# Patient Record
Sex: FEMALE | Race: WHITE | Employment: OTHER
[De-identification: names, ages, dates, MRNs, and addresses within clinical notes are randomized per-mention and may not be internally consistent; named-entity substitution may affect disease eponyms.]

---

## 2017-01-24 ENCOUNTER — OFFICE VISIT (OUTPATIENT)
Dept: FAMILY MEDICINE CLINIC | Facility: CLINIC | Age: 77
End: 2017-01-24

## 2017-01-24 VITALS
WEIGHT: 163 LBS | SYSTOLIC BLOOD PRESSURE: 120 MMHG | DIASTOLIC BLOOD PRESSURE: 60 MMHG | BODY MASS INDEX: 29.81 KG/M2 | TEMPERATURE: 98.5 F

## 2017-01-24 DIAGNOSIS — J01.00 ACUTE NON-RECURRENT MAXILLARY SINUSITIS: Primary | ICD-10-CM

## 2017-01-24 PROCEDURE — 1036F TOBACCO NON-USER: CPT | Performed by: FAMILY MEDICINE

## 2017-01-24 PROCEDURE — 99213 OFFICE O/P EST LOW 20 MIN: CPT | Performed by: FAMILY MEDICINE

## 2017-01-24 PROCEDURE — 1123F ACP DISCUSS/DSCN MKR DOCD: CPT | Performed by: FAMILY MEDICINE

## 2017-01-24 PROCEDURE — G8400 PT W/DXA NO RESULTS DOC: HCPCS | Performed by: FAMILY MEDICINE

## 2017-01-24 PROCEDURE — G8484 FLU IMMUNIZE NO ADMIN: HCPCS | Performed by: FAMILY MEDICINE

## 2017-01-24 PROCEDURE — 1090F PRES/ABSN URINE INCON ASSESS: CPT | Performed by: FAMILY MEDICINE

## 2017-01-24 PROCEDURE — G8427 DOCREV CUR MEDS BY ELIG CLIN: HCPCS | Performed by: FAMILY MEDICINE

## 2017-01-24 PROCEDURE — G8420 CALC BMI NORM PARAMETERS: HCPCS | Performed by: FAMILY MEDICINE

## 2017-01-24 PROCEDURE — 4040F PNEUMOC VAC/ADMIN/RCVD: CPT | Performed by: FAMILY MEDICINE

## 2017-01-24 RX ORDER — AMOXICILLIN AND CLAVULANATE POTASSIUM 875; 125 MG/1; MG/1
1 TABLET, FILM COATED ORAL 2 TIMES DAILY
Qty: 20 TABLET | Refills: 0 | Status: SHIPPED | OUTPATIENT
Start: 2017-01-24 | End: 2017-02-03

## 2017-01-24 ASSESSMENT — ENCOUNTER SYMPTOMS
SINUS PAIN: 1
SORE THROAT: 0
EYE REDNESS: 0
VOMITING: 0
DIARRHEA: 0
EYE DISCHARGE: 0
COUGH: 1
TROUBLE SWALLOWING: 0
NAUSEA: 0
SINUS PRESSURE: 1

## 2017-01-24 ASSESSMENT — PATIENT HEALTH QUESTIONNAIRE - PHQ9
2. FEELING DOWN, DEPRESSED OR HOPELESS: 0
1. LITTLE INTEREST OR PLEASURE IN DOING THINGS: 0
SUM OF ALL RESPONSES TO PHQ QUESTIONS 1-9: 0
SUM OF ALL RESPONSES TO PHQ9 QUESTIONS 1 & 2: 0

## 2017-05-15 ENCOUNTER — OFFICE VISIT (OUTPATIENT)
Dept: FAMILY MEDICINE CLINIC | Age: 77
End: 2017-05-15
Payer: MEDICARE

## 2017-05-15 VITALS
SYSTOLIC BLOOD PRESSURE: 136 MMHG | BODY MASS INDEX: 30.54 KG/M2 | WEIGHT: 167 LBS | OXYGEN SATURATION: 97 % | DIASTOLIC BLOOD PRESSURE: 62 MMHG | HEART RATE: 73 BPM

## 2017-05-15 DIAGNOSIS — M17.10 ARTHRITIS OF KNEE: Primary | ICD-10-CM

## 2017-05-15 DIAGNOSIS — Z12.31 ENCOUNTER FOR SCREENING MAMMOGRAM FOR BREAST CANCER: ICD-10-CM

## 2017-05-15 PROCEDURE — 1090F PRES/ABSN URINE INCON ASSESS: CPT | Performed by: FAMILY MEDICINE

## 2017-05-15 PROCEDURE — G8400 PT W/DXA NO RESULTS DOC: HCPCS | Performed by: FAMILY MEDICINE

## 2017-05-15 PROCEDURE — G8419 CALC BMI OUT NRM PARAM NOF/U: HCPCS | Performed by: FAMILY MEDICINE

## 2017-05-15 PROCEDURE — 4040F PNEUMOC VAC/ADMIN/RCVD: CPT | Performed by: FAMILY MEDICINE

## 2017-05-15 PROCEDURE — 1036F TOBACCO NON-USER: CPT | Performed by: FAMILY MEDICINE

## 2017-05-15 PROCEDURE — 1123F ACP DISCUSS/DSCN MKR DOCD: CPT | Performed by: FAMILY MEDICINE

## 2017-05-15 PROCEDURE — G8427 DOCREV CUR MEDS BY ELIG CLIN: HCPCS | Performed by: FAMILY MEDICINE

## 2017-05-15 PROCEDURE — 99213 OFFICE O/P EST LOW 20 MIN: CPT | Performed by: FAMILY MEDICINE

## 2017-05-15 ASSESSMENT — ENCOUNTER SYMPTOMS
EYE REDNESS: 0
FACIAL SWELLING: 0
VOMITING: 0
ABDOMINAL PAIN: 0
CONSTIPATION: 0
DIARRHEA: 0
SHORTNESS OF BREATH: 0
COUGH: 0
EYE DISCHARGE: 0
BLOOD IN STOOL: 0
NAUSEA: 0

## 2017-05-15 ASSESSMENT — PATIENT HEALTH QUESTIONNAIRE - PHQ9
SUM OF ALL RESPONSES TO PHQ QUESTIONS 1-9: 0
SUM OF ALL RESPONSES TO PHQ9 QUESTIONS 1 & 2: 0
2. FEELING DOWN, DEPRESSED OR HOPELESS: 0
1. LITTLE INTEREST OR PLEASURE IN DOING THINGS: 0

## 2017-05-24 ENCOUNTER — HOSPITAL ENCOUNTER (OUTPATIENT)
Dept: MAMMOGRAPHY | Age: 77
Discharge: HOME | End: 2017-05-24
Payer: MEDICARE

## 2017-05-24 DIAGNOSIS — Z12.31 ENCOUNTER FOR SCREENING MAMMOGRAM FOR BREAST CANCER: ICD-10-CM

## 2017-05-24 PROCEDURE — G0202 SCR MAMMO BI INCL CAD: HCPCS

## 2018-05-08 ENCOUNTER — OFFICE VISIT (OUTPATIENT)
Dept: FAMILY MEDICINE CLINIC | Age: 78
End: 2018-05-08
Payer: MEDICARE

## 2018-05-08 VITALS
SYSTOLIC BLOOD PRESSURE: 138 MMHG | BODY MASS INDEX: 30.18 KG/M2 | DIASTOLIC BLOOD PRESSURE: 68 MMHG | HEART RATE: 92 BPM | OXYGEN SATURATION: 96 % | WEIGHT: 165 LBS

## 2018-05-08 DIAGNOSIS — H35.61 RETINAL HEMORRHAGE NOTED ON EXAMINATION OF RIGHT EYE: Primary | ICD-10-CM

## 2018-05-08 DIAGNOSIS — R42 DIZZINESS: ICD-10-CM

## 2018-05-08 PROCEDURE — G8419 CALC BMI OUT NRM PARAM NOF/U: HCPCS | Performed by: FAMILY MEDICINE

## 2018-05-08 PROCEDURE — G8427 DOCREV CUR MEDS BY ELIG CLIN: HCPCS | Performed by: FAMILY MEDICINE

## 2018-05-08 PROCEDURE — G8400 PT W/DXA NO RESULTS DOC: HCPCS | Performed by: FAMILY MEDICINE

## 2018-05-08 PROCEDURE — 1036F TOBACCO NON-USER: CPT | Performed by: FAMILY MEDICINE

## 2018-05-08 PROCEDURE — 99213 OFFICE O/P EST LOW 20 MIN: CPT | Performed by: FAMILY MEDICINE

## 2018-05-08 PROCEDURE — 1123F ACP DISCUSS/DSCN MKR DOCD: CPT | Performed by: FAMILY MEDICINE

## 2018-05-08 PROCEDURE — 1090F PRES/ABSN URINE INCON ASSESS: CPT | Performed by: FAMILY MEDICINE

## 2018-05-08 PROCEDURE — 4040F PNEUMOC VAC/ADMIN/RCVD: CPT | Performed by: FAMILY MEDICINE

## 2018-05-08 ASSESSMENT — ENCOUNTER SYMPTOMS
VOMITING: 0
EYE REDNESS: 0
SHORTNESS OF BREATH: 0
COUGH: 0
EYE PAIN: 0
FACIAL SWELLING: 0
EYE DISCHARGE: 0
DIARRHEA: 0

## 2018-05-16 ENCOUNTER — HOSPITAL ENCOUNTER (OUTPATIENT)
Dept: VASCULAR LAB | Age: 78
Discharge: HOME | End: 2018-05-18
Payer: MEDICARE

## 2018-05-16 DIAGNOSIS — R42 DIZZINESS: ICD-10-CM

## 2018-05-16 DIAGNOSIS — H35.61 RETINAL HEMORRHAGE NOTED ON EXAMINATION OF RIGHT EYE: ICD-10-CM

## 2018-05-16 PROCEDURE — 93880 EXTRACRANIAL BILAT STUDY: CPT

## 2018-05-17 LAB
BUN BLDV-MCNC: 16 MG/DL
CALCIUM SERPL-MCNC: 9.2 MG/DL
CHLORIDE BLD-SCNC: 106 MMOL/L
CHOLESTEROL, TOTAL: 205 MG/DL
CHOLESTEROL/HDL RATIO: 3.6
CO2: 27 MMOL/L
CREAT SERPL-MCNC: 0.66 MG/DL
GFR CALCULATED: >60
GLUCOSE BLD-MCNC: 99 MG/DL
HDLC SERPL-MCNC: 57 MG/DL (ref 35–70)
LDL CHOLESTEROL CALCULATED: 123 MG/DL (ref 0–160)
POTASSIUM SERPL-SCNC: 4.9 MMOL/L
SODIUM BLD-SCNC: 143 MMOL/L
TRIGL SERPL-MCNC: 125 MG/DL
VLDLC SERPL CALC-MCNC: NORMAL MG/DL

## 2018-05-30 ENCOUNTER — TELEPHONE (OUTPATIENT)
Dept: FAMILY MEDICINE CLINIC | Age: 78
End: 2018-05-30

## 2018-05-30 DIAGNOSIS — R42 DIZZINESS: ICD-10-CM

## 2018-05-30 DIAGNOSIS — H35.61 RETINAL HEMORRHAGE OF RIGHT EYE: Primary | ICD-10-CM

## 2018-06-04 ENCOUNTER — OFFICE VISIT (OUTPATIENT)
Dept: FAMILY MEDICINE CLINIC | Age: 78
End: 2018-06-04
Payer: MEDICARE

## 2018-06-04 VITALS
WEIGHT: 164.2 LBS | SYSTOLIC BLOOD PRESSURE: 120 MMHG | OXYGEN SATURATION: 96 % | HEART RATE: 75 BPM | HEIGHT: 62 IN | DIASTOLIC BLOOD PRESSURE: 68 MMHG | BODY MASS INDEX: 30.22 KG/M2

## 2018-06-04 DIAGNOSIS — L23.89 ALLERGIC CONTACT DERMATITIS DUE TO OTHER AGENTS: Primary | ICD-10-CM

## 2018-06-04 PROCEDURE — 1090F PRES/ABSN URINE INCON ASSESS: CPT | Performed by: FAMILY MEDICINE

## 2018-06-04 PROCEDURE — 1123F ACP DISCUSS/DSCN MKR DOCD: CPT | Performed by: FAMILY MEDICINE

## 2018-06-04 PROCEDURE — G8427 DOCREV CUR MEDS BY ELIG CLIN: HCPCS | Performed by: FAMILY MEDICINE

## 2018-06-04 PROCEDURE — 3288F FALL RISK ASSESSMENT DOCD: CPT | Performed by: FAMILY MEDICINE

## 2018-06-04 PROCEDURE — 4040F PNEUMOC VAC/ADMIN/RCVD: CPT | Performed by: FAMILY MEDICINE

## 2018-06-04 PROCEDURE — G8400 PT W/DXA NO RESULTS DOC: HCPCS | Performed by: FAMILY MEDICINE

## 2018-06-04 PROCEDURE — 1036F TOBACCO NON-USER: CPT | Performed by: FAMILY MEDICINE

## 2018-06-04 PROCEDURE — G8419 CALC BMI OUT NRM PARAM NOF/U: HCPCS | Performed by: FAMILY MEDICINE

## 2018-06-04 PROCEDURE — G8510 SCR DEP NEG, NO PLAN REQD: HCPCS | Performed by: FAMILY MEDICINE

## 2018-06-04 PROCEDURE — 99213 OFFICE O/P EST LOW 20 MIN: CPT | Performed by: FAMILY MEDICINE

## 2018-06-04 RX ORDER — TRIAMCINOLONE ACETONIDE 40 MG/ML
40 INJECTION, SUSPENSION INTRA-ARTICULAR; INTRAMUSCULAR ONCE
Status: COMPLETED | OUTPATIENT
Start: 2018-06-04 | End: 2018-06-04

## 2018-06-04 RX ADMIN — TRIAMCINOLONE ACETONIDE 40 MG: 40 INJECTION, SUSPENSION INTRA-ARTICULAR; INTRAMUSCULAR at 10:30

## 2018-06-04 ASSESSMENT — ENCOUNTER SYMPTOMS
EYE REDNESS: 0
COUGH: 0
TROUBLE SWALLOWING: 0
VOMITING: 0
EYE DISCHARGE: 0
SHORTNESS OF BREATH: 0
DIARRHEA: 0
SORE THROAT: 0

## 2018-06-04 ASSESSMENT — PATIENT HEALTH QUESTIONNAIRE - PHQ9
SUM OF ALL RESPONSES TO PHQ9 QUESTIONS 1 & 2: 0
SUM OF ALL RESPONSES TO PHQ QUESTIONS 1-9: 0
2. FEELING DOWN, DEPRESSED OR HOPELESS: 0
1. LITTLE INTEREST OR PLEASURE IN DOING THINGS: 0

## 2018-07-03 ENCOUNTER — TELEPHONE (OUTPATIENT)
Dept: FAMILY MEDICINE CLINIC | Age: 78
End: 2018-07-03

## 2018-07-03 DIAGNOSIS — Z12.31 ENCOUNTER FOR SCREENING MAMMOGRAM FOR BREAST CANCER: Primary | ICD-10-CM

## 2018-07-09 ENCOUNTER — HOSPITAL ENCOUNTER (OUTPATIENT)
Dept: MAMMOGRAPHY | Age: 78
Discharge: HOME | End: 2018-07-11
Payer: MEDICARE

## 2018-07-09 ENCOUNTER — HOSPITAL ENCOUNTER (OUTPATIENT)
Dept: PHYSICAL THERAPY | Age: 78
Setting detail: THERAPIES SERIES
Discharge: HOME | End: 2018-07-09
Payer: MEDICARE

## 2018-07-09 DIAGNOSIS — Z12.31 ENCOUNTER FOR SCREENING MAMMOGRAM FOR BREAST CANCER: ICD-10-CM

## 2018-07-09 PROCEDURE — 77067 SCR MAMMO BI INCL CAD: CPT

## 2018-07-09 PROCEDURE — G8978 MOBILITY CURRENT STATUS: HCPCS

## 2018-07-09 PROCEDURE — G8979 MOBILITY GOAL STATUS: HCPCS

## 2018-07-09 PROCEDURE — 97161 PT EVAL LOW COMPLEX 20 MIN: CPT

## 2018-07-09 NOTE — PLAN OF CARE
3: Pt to have 4+/5 Hip abd strength to improve walking arabella and balance. Long term goal 4: Pt to reports worst pain following days ADLs <5/10 x 2 consecutive days to improve ADLs. Anahy Selby, PT     Date: 7/9/2018    ______________________________________ Date: 7/9/2018  Physician Signature  By signing above or cosigning electronically, I have reviewed this Plan of Care and certify a need for medically necessary rehabilitation services.

## 2018-07-09 NOTE — PROGRESS NOTES
3+/5  L Hip ABduction: 3+/5  L Hip ADduction: 4+/5  L Knee Flexion: 4+/5  L Knee Extension: 4+/5  L Ankle Dorsiflexion: 5/5  AROM LLE (degrees)  L Knee Flexion 0-145: 114deg  L Knee Extension 0: 3deg FN  L Ankle Dorsiflexion 0-20: 5deg     AROM RLE (degrees)  R Knee Flexion 0-145: 120deg  R Knee Extension 0: 10degFN  R Ankle Dorsiflexion 0-20: neutral  AROM LLE (degrees)  L Knee Flexion 0-145: 114deg  L Knee Extension 0: 3deg FN  L Ankle Dorsiflexion 0-20: 5deg     Assessment  Body structures, Functions, Activity limitations: Decreased functional mobility , Decreased ADL status, Decreased ROM, Decreased strength, Decreased endurance, Decreased balance, Decreased high-level IADLs  Assessment: Pt to benefit from ther ex for ROM and strength of B/L LEs, and balance training. IFC/CP PRN for pain management.   Prognosis: Good  Decision Making: Low Complexity  Exam: LEFS=46/80    Clinical Presentation:  [x] Stable/Uncomplicated  [] Evolving [] Unstable/Unpredictable  The Following Comorbities will impact the patients progression and Plan of Care:   [] Diabetes    [] Stroke  [] Cardiac Disease/Pacemaker [] Previous Orthopedic Injury/Surgery  [] Seizure Disorder   [] WB Restrictions  [] Obesity    [] Neuropathy     Activity Tolerance: Patient Tolerated treatment well  [x] Primary Impairment : Mobility due to knee pain B/L [] Secondary Impairment : NA        G Code:    [x] Mobility         [] Carry        [] Body Position       [] Self Care      [] Other:   Functional Impairment Current:  [] 0%    [] 1-19% [] 20-39% [x] 40-59% [] 60-79%    [] 80-99% [] 100%    Functional Impairment Goal:  [] 0%    [x] 1-19% [] 20-39% [] 40-59% [] 60-79%    [] 80-99% [] 100%    G Code Functional Impairment determined by:  [x] Clinical Judgment   [x] Outcome Measure: LEFS  Education:   Patient Education: POC  Barriers to Learning: None    Goals  Short term goals  Time Frame for Short term goals: 9 visits  Short term goal 1: Pt to report independence and compliance with HEP. Long term goals  Time Frame for Long term goals : 18 visits  Long term goal 1: Pt to score > 54/80 on LEFS to improve ADL arabella. Long term goal 2: Pt to have R knee ext <5deg FN with AROM to improve standing and walking arabella. Long term goal 3: Pt to have 4+/5 Hip abd strength to improve walking arabella and balance. Long term goal 4: Pt to reports worst pain following days ADLs <5/10 x 2 consecutive days to improve ADLs.     Patient's Goal:   Get stronger before she has her sx    Timed Code Treatment Minutes: 0 Minutes  Total Treatment Time: 50  Time In: 7045  Time Out: Addie 77  7/9/2018

## 2018-07-11 ENCOUNTER — HOSPITAL ENCOUNTER (OUTPATIENT)
Dept: PHYSICAL THERAPY | Age: 78
Setting detail: THERAPIES SERIES
Discharge: HOME | End: 2018-07-11
Payer: MEDICARE

## 2018-07-11 PROCEDURE — 97110 THERAPEUTIC EXERCISES: CPT

## 2018-07-11 ASSESSMENT — PAIN SCALES - GENERAL: PAINLEVEL_OUTOF10: 6

## 2018-07-13 ENCOUNTER — HOSPITAL ENCOUNTER (OUTPATIENT)
Dept: PHYSICAL THERAPY | Age: 78
Setting detail: THERAPIES SERIES
Discharge: HOME | End: 2018-07-13
Payer: MEDICARE

## 2018-07-13 PROCEDURE — 97110 THERAPEUTIC EXERCISES: CPT

## 2018-07-13 ASSESSMENT — PAIN SCALES - GENERAL: PAINLEVEL_OUTOF10: 5

## 2018-07-13 NOTE — PROGRESS NOTES
Phone: [address]      Fax: [phone]                            Outpatient Physical Therapy                                                                            Daily Note    Date: 2018  Patient Name: Regine Morales        MRN: [id number]   ACCT#:  [de-identified]  : 1940  (66 y.o.)    Referring Practitioner: Elmer Tucker CNP    Referral Date : 18    Diagnosis: Pain in both knees, B/L knee Primary Osteoarthritis   Treatment Diagnosis: Gait ataxia due to B/L knee pain    Onset Date: 18  PT Insurance Information: [facility]  Total # of Visits Approved: 18 Per Physician Order  Total # of Visits to Date: 3  No Show: 0  Canceled Appointment: 0    Pre-Treatment Pain: 0/10     Assessment  Assessment: Pt reports mild soreness post last session. Pt reports that she picked green beans yesterday afternoon with noted increased soreness but states she did not squat but she bent at the waist.  Pt educated re: back health and proper mechanics for back safety. Pt notes Hip ABD fatigue during sidestepping, pt educated on importance of hip ABD for standing and walking arabella as well as preventing medial knee collapse. Chart Reviewed: Yes    Plan  Plan: Continue with current plan    Exercises/Modalities/Manual:  See DocFlow Sheet    Education:      Goals  (Total # of Visits to Date: 3)  Short Term Goals - Time Frame for Short term goals: 9 visits  Short term goal 1: Pt to report independence and compliance with HEP. Long Term Goals - Time Frame for Long term goals : 18 visits  Long term goal 1: Pt to score > 54/80 on LEFS to improve ADL arabella. Long term goal 2: Pt to have R knee ext <5deg FN with AROM to improve standing and walking arabella. Long term goal 3: Pt to have 4+/5 Hip abd strength to improve walking arabella and balance. Long term goal 4: Pt to reports worst pain following days ADLs <5/10 x 2 consecutive days to improve ADLs.      Post Treatment Pain:

## 2018-07-13 NOTE — PRE-CERTIFICATION NOTE
Medicare Cap     [] Physical Therapy  [] Speech Therapy  [] Occupational therapy    *PT and Speech caps combine      $1940 Cap limit < kx modifier needed < $2947 requires pre-cert     Patient Name: Oksana Ho  YOB: 1940     Date of [address] Name $$$ charge Daily Charge YTD   Total $   7/9/18 eval 81.88 81.88 81.88   7/11/18 Ex x 2 29.97 x2 59.94 141.82   7/13/18 Ex x3 29.97 x3 89.91 231.73

## 2018-07-16 ENCOUNTER — HOSPITAL ENCOUNTER (OUTPATIENT)
Dept: PHYSICAL THERAPY | Age: 78
Setting detail: THERAPIES SERIES
Discharge: HOME | End: 2018-07-16
Payer: MEDICARE

## 2018-07-16 PROCEDURE — 97110 THERAPEUTIC EXERCISES: CPT

## 2018-07-16 ASSESSMENT — PAIN DESCRIPTION - LOCATION: LOCATION: KNEE

## 2018-07-16 ASSESSMENT — PAIN DESCRIPTION - DIRECTION: RADIATING_TOWARDS: MEDIAL

## 2018-07-16 ASSESSMENT — PAIN SCALES - GENERAL: PAINLEVEL_OUTOF10: 7

## 2018-07-16 ASSESSMENT — PAIN DESCRIPTION - ORIENTATION: ORIENTATION: LEFT

## 2018-07-18 ENCOUNTER — HOSPITAL ENCOUNTER (OUTPATIENT)
Dept: PHYSICAL THERAPY | Age: 78
Setting detail: THERAPIES SERIES
Discharge: HOME | End: 2018-07-18
Payer: MEDICARE

## 2018-07-18 PROCEDURE — 97110 THERAPEUTIC EXERCISES: CPT

## 2018-07-18 ASSESSMENT — PAIN SCALES - GENERAL: PAINLEVEL_OUTOF10: 8

## 2018-07-18 NOTE — PRE-CERTIFICATION NOTE
Medicare Cap     [] Physical Therapy  [] Speech Therapy  [] Occupational therapy    *PT and Speech caps combine      $1940 Cap limit < kx modifier needed < $3339 requires pre-cert     Patient Name: Cristy Coronel  YOB: 1940     Date of [address] Name $$$ charge Daily Charge YTD   Total $   7/9/18 eval 81.88 81.88 81.88   7/11/18 Ex x 2 29.97 x2 59.94 141.82   7/13/18 Ex x3 29.97 x3 89.91 231.73   7/18/18 Ex x 3

## 2018-07-18 NOTE — PROGRESS NOTES
Phone: [address]      Fax: [phone]                            Outpatient Physical Therapy                                                                            Daily Note    Date: 2018  Patient Name: Oksana Ho        MRN: [id number]   ACCT#:  [de-identified]  : 1940  (66 y.o.)    Referring Practitioner: Jessica Ramsey CNP    Referral Date : 18    Diagnosis: Pain in both knees, B/L knee Primary Osteoarthritis   Treatment Diagnosis: Gait ataxia due to B/L knee pain    Onset Date: 18  PT Insurance Information: [facility]  Total # of Visits Approved: 18 Per Physician Order  Total # of Visits to Date: 5  No Show: 0  Canceled Appointment: 0    Pre-Treatment Pain:  810     Assessment  Assessment: Pt reports she was on her feet all day yesterday  and also picked green beans. Pain 8/10 today described as \"ache\". Performed ex as outlined. Cues for posture for hip extension to emphasize hip extensor musculature. Issued additional stretches for HEP. Chart Reviewed: Yes    Plan  Plan: Continue with current plan    Exercises/Modalities/Manual:  See DocFlow Sheet    Education:   Patient Education: HS and [facility] [employer]    Goals  (Total # of Visits to Date: 5)   Short Term Goals - Time Frame for Short term goals: 9 visits  Short term goal 1: Pt to report independence and compliance with HEP. Long Term Goals - Time Frame for Long term goals : 18 visits  Long term goal 1: Pt to score > 54/80 on LEFS to improve ADL arabella. Long term goal 2: Pt to have R knee ext <5deg FN with AROM to improve standing and walking arabella. Long term goal 3: Pt to have 4+/5 Hip abd strength to improve walking arabella and balance. Long term goal 4: Pt to reports worst pain following days ADLs <5/10 x 2 consecutive days to improve ADLs.     Post Treatment Pain:  8/10    Time In: 0900    Time Out : 0945   Timed Code Treatment Minutes: 45 Minutes  Total Treatment Time: 39

## 2018-07-20 ENCOUNTER — HOSPITAL ENCOUNTER (OUTPATIENT)
Dept: PHYSICAL THERAPY | Age: 78
Setting detail: THERAPIES SERIES
Discharge: HOME | End: 2018-07-20
Payer: MEDICARE

## 2018-07-20 PROCEDURE — 97110 THERAPEUTIC EXERCISES: CPT

## 2018-07-20 NOTE — PRE-CERTIFICATION NOTE
Medicare Cap     [] Physical Therapy  [] Speech Therapy  [] Occupational therapy    *PT and Speech caps combine      $1940 Cap limit < kx modifier needed < $7465 requires pre-cert     Patient Name: Lauren Harris  YOB: 1940     Date of [address] Name $$$ charge Daily Charge YTD   Total $   7/9/18 eval 81.88 81.88 81.88   7/11/18 Ex x 2 29.97 x2 59.94 141.82   7/13/18 Ex x3 29.97 x3 89.91 231.73   7/18/18 Ex x 3 29.97 x3 89.91 321.64

## 2018-07-23 ENCOUNTER — HOSPITAL ENCOUNTER (OUTPATIENT)
Dept: PHYSICAL THERAPY | Age: 78
Setting detail: THERAPIES SERIES
Discharge: HOME | End: 2018-07-23
Payer: MEDICARE

## 2018-07-23 PROCEDURE — 97110 THERAPEUTIC EXERCISES: CPT

## 2018-07-23 PROCEDURE — G8978 MOBILITY CURRENT STATUS: HCPCS

## 2018-07-23 PROCEDURE — G8979 MOBILITY GOAL STATUS: HCPCS

## 2018-07-23 ASSESSMENT — PAIN SCALES - GENERAL: PAINLEVEL_OUTOF10: 5

## 2018-07-24 NOTE — PRE-CERTIFICATION NOTE
Medicare Cap     [] Physical Therapy  [] Speech Therapy  [] Occupational therapy    *PT and Speech caps combine      $1940 Cap limit < kx modifier needed < $4456 requires pre-cert     Patient Name: Chrissie Francis  YOB: 1940     Date of [address] Name $$$ charge Daily Charge YTD   Total $   7/9/18 eval 81.88 81.88 81.88   7/11/18 Ex x 2 29.97 x2 59.94 141.82   7/13/18 Ex x3 29.97 x3 89.91 231.73   7/18/18 Ex x 3 29.97 x3 89.91 321.64   7/23/18 Ex x 3 29.97 x 3 89.91 411.55

## 2018-07-25 ENCOUNTER — HOSPITAL ENCOUNTER (OUTPATIENT)
Dept: PHYSICAL THERAPY | Age: 78
Setting detail: THERAPIES SERIES
Discharge: HOME | End: 2018-07-25
Payer: MEDICARE

## 2018-07-25 PROCEDURE — 97110 THERAPEUTIC EXERCISES: CPT

## 2018-07-25 ASSESSMENT — PAIN SCALES - GENERAL: PAINLEVEL_OUTOF10: 7

## 2018-07-25 NOTE — PROGRESS NOTES
Phone: [address]      Fax: [phone]                            Outpatient Physical Therapy                                                                            Daily Note    Date: 2018  Patient Name: Latisha Jefferson        MRN: [id number]   ACCT#:  [de-identified]  : 1940  (66 y.o.)    Referring Practitioner: Rodrigo Hyde CNP    Referral Date : 18    Diagnosis: Pain in both knees, B/L knee Primary Osteoarthritis   Treatment Diagnosis: Gait ataxia due to B/L knee pain    Onset Date: 18  PT Insurance Information: [facility]  Total # of Visits Approved: 18 Per Physician Order  Total # of Visits to Date: 9  No Show: 0  Canceled Appointment: 0    Pre-Treatment Pain:  7/10     Assessment  Assessment: Pt reports complaince to HEP. Pt lacks terminal knee extension with 5-6 deg in long sitting. Pt remains very active in her garden with gallo n 7/10. Chart Reviewed: Yes    Plan  Plan: Continue with current plan    Exercises/Modalities/Manual:  See DocFlow Sheet    Goals  (Total # of Visits to Date: 5)   Short Term Goals - Time Frame for Short term goals: 9 visits  Short term goal 1: Pt to report independence and compliance with HEP. -met    Long Term Goals - Time Frame for Long term goals : 18 visits  Long term goal 1: Pt to score > 54/80 on LEFS to improve ADL arabella. Long term goal 2: Pt to have R knee ext <5deg FN with AROM to improve standing and walking arabella. Long term goal 3: Pt to have 4+/5 Hip abd strength to improve walking arabella and balance. Long term goal 4: Pt to reports worst pain following days ADLs <5/10 x 2 consecutive days to improve ADLs.        Post Treatment Pain:  7/10    Time In: 3212    Time Out : 1027  Timed Code Treatment Minutes: 40 Minutes  Total Treatment Time: [address] Number: PTA    Date: 2018

## 2018-07-27 ENCOUNTER — HOSPITAL ENCOUNTER (OUTPATIENT)
Dept: PHYSICAL THERAPY | Age: 78
Setting detail: THERAPIES SERIES
Discharge: HOME | End: 2018-07-27
Payer: MEDICARE

## 2018-07-27 PROCEDURE — 97110 THERAPEUTIC EXERCISES: CPT

## 2018-07-27 ASSESSMENT — PAIN SCALES - GENERAL: PAINLEVEL_OUTOF10: 6

## 2018-07-27 NOTE — PROGRESS NOTES
Phone: [address]      Fax: [phone]                            Outpatient Physical Therapy                                                                            Daily Note    Date: 2018  Patient Name: Andry Ruiz        MRN: [id number]   ACCT#:  [de-identified]  : 1940  (66 y.o.)    Referring Practitioner: Racquel Aleman CNP    Referral Date : 18    Diagnosis: Pain in both knees, B/L knee Primary Osteoarthritis   Treatment Diagnosis: Gait ataxia due to B/L knee pain    Onset Date: 18  PT Insurance Information: [facility]  Total # of Visits Approved: 18 Per Physician Order  Total # of Visits to Date: 10  No Show: 0  Canceled Appointment: 0    Pre-Treatment Pain:  6/10     Assessment  Assessment: Pt reports she pulled a muscle in her right hip yesterday in the garden. Knee pain 6/10. She was able to perform ex's without increased hip  pain. Did add hip flexor stretch (gentle). Will cont . Chart Reviewed: Yes    Plan  Plan: Continue with current plan    Exercises/Modalities/Manual:  See DocFlow Sheet      Goals  (Total # of Visits to Date: 10)   Short Term Goals - Time Frame for Short term goals: 9 visits  Short term goal 1: Pt to report independence and compliance with HEP. -met    Long Term Goals - Time Frame for Long term goals : 18 visits  Long term goal 1: Pt to score > 54/80 on LEFS to improve ADL arabella. Long term goal 2: Pt to have R knee ext <5deg FN with AROM to improve standing and walking arabella. Long term goal 3: Pt to have 4+/5 Hip abd strength to improve walking arabella and balance. Long term goal 4: Pt to reports worst pain following days ADLs <5/10 x 2 consecutive days to improve ADLs.      Post Treatment Pain:  6/10    Time In: 0900    Time Out : 0940  Timed Code Treatment Minutes: 40 Minutes  Total Treatment Time: 36 Minutes    [facility] License Number: PTA    Date: 2018

## 2018-07-30 ENCOUNTER — HOSPITAL ENCOUNTER (OUTPATIENT)
Dept: PHYSICAL THERAPY | Age: 78
Setting detail: THERAPIES SERIES
Discharge: HOME | End: 2018-07-30
Payer: MEDICARE

## 2018-07-30 ENCOUNTER — TELEPHONE (OUTPATIENT)
Dept: CARDIOLOGY CLINIC | Age: 78
End: 2018-07-30

## 2018-07-30 DIAGNOSIS — R53.83 FATIGUE, UNSPECIFIED TYPE: ICD-10-CM

## 2018-07-30 DIAGNOSIS — R94.31 ABNORMAL EKG: ICD-10-CM

## 2018-07-30 DIAGNOSIS — R06.02 SOB (SHORTNESS OF BREATH): ICD-10-CM

## 2018-07-30 DIAGNOSIS — Z01.818 PRE-OP TESTING: Primary | ICD-10-CM

## 2018-07-30 DIAGNOSIS — E55.9 VITAMIN D DEFICIENCY: ICD-10-CM

## 2018-07-30 DIAGNOSIS — Z13.220 SCREENING FOR HYPERLIPIDEMIA: ICD-10-CM

## 2018-07-30 DIAGNOSIS — R07.89 ATYPICAL CHEST PAIN: ICD-10-CM

## 2018-07-30 PROCEDURE — 97110 THERAPEUTIC EXERCISES: CPT

## 2018-07-30 ASSESSMENT — PAIN SCALES - GENERAL: PAINLEVEL_OUTOF10: 5

## 2018-07-30 NOTE — PRE-CERTIFICATION NOTE
Medicare Cap     [] Physical Therapy  [] Speech Therapy  [] Occupational therapy    *PT and Speech caps combine      $1940 Cap limit < kx modifier needed < $8084 requires pre-cert     Patient Name: Chrissie Francis  YOB: 1940     Date of [address] Name $$$ charge Daily Charge YTD   Total $   7/9/18 eval 81.88 81.88 81.88   7/11/18 Ex x 2 29.97 x2 59.94 141.82   7/13/18 Ex x3 29.97 x3 89.91 231.73   7/18/18 Ex x 3 29.97 x3 89.91 321.64   7/23/18 Ex x 3 29.97 x 3 89.91 411.55   7/25/18 Ex x 3 29.97 x3 89.91 501.46   7/27/18 Ex x 3 29.97 x3 89.91 591.37   7/30/18 Ex x 3 29.97 x3 89.91 681.28

## 2018-07-30 NOTE — TELEPHONE ENCOUNTER
Patient stopped in office, she has upcoming appt with Dr Samir Lee for cardiac clearance for her knee surgery and she needs to have testing done prior to her appt.   Orders placed and pended for Dr. Kelin Mcpherson.

## 2018-07-30 NOTE — PROGRESS NOTES
Phone: [address]      Fax: [phone]                            Outpatient Physical Therapy                                                                            Daily Note    Date: 2018  Patient Name: Debra Alexis        MRN: [id number]   ACCT#:  [de-identified]  : 1940  (66 y.o.)    Referring Practitioner: Sera Cameron CNP    Referral Date : 18    Diagnosis: Pain in both knees, B/L knee Primary Osteoarthritis   Treatment Diagnosis: Gait ataxia due to B/L knee pain    Onset Date: 18  PT Insurance Information: [facility]  Total # of Visits Approved: 18 Per Physician Order  Total # of Visits to Date: 11  No Show: 0  Canceled Appointment: 0    Pre-Treatment Pain:  5/10     Assessment  Assessment: Pt with no c/o hip flexor pain  today. Performed ex as outlined. Performed hurldles knocking over   with 1 handrail. She notes pain lower at 5/10 today. Chart Reviewed: Yes    Plan  Plan: Continue with current plan    Exercises/Modalities/Manual:  See DocFlow Sheet    Goals  (Total # of Visits to Date: 6)   Short Term Goals - Time Frame for Short term goals: 9 visits  Short term goal 1: Pt to report independence and compliance with HEP. -met    Long Term Goals - Time Frame for Long term goals : 18 visits  Long term goal 1: Pt to score > 54/80 on LEFS to improve ADL arabella. Long term goal 2: Pt to have R knee ext <5deg FN with AROM to improve standing and walking arabella. Long term goal 3: Pt to have 4+/5 Hip abd strength to improve walking arabella and balance. Long term goal 4: Pt to reports worst pain following days ADLs <5/10 x 2 consecutive days to improve ADLs.     Post Treatment Pain:  5/10    Time In: 5515    Time Out : 1030  Timed Code Treatment Minutes: 42 Minutes  Total Treatment Time: [address] Number: PTA    Date: 2018

## 2018-08-01 ENCOUNTER — HOSPITAL ENCOUNTER (OUTPATIENT)
Dept: PHYSICAL THERAPY | Age: 78
Setting detail: THERAPIES SERIES
Discharge: HOME | End: 2018-08-01
Payer: MEDICARE

## 2018-08-01 PROCEDURE — 97110 THERAPEUTIC EXERCISES: CPT

## 2018-08-01 ASSESSMENT — PAIN SCALES - GENERAL: PAINLEVEL_OUTOF10: 6

## 2018-08-01 NOTE — PRE-CERTIFICATION NOTE
Medicare Cap     [] Physical Therapy  [] Speech Therapy  [] Occupational therapy    *PT and Speech caps combine      $1940 Cap limit < kx modifier needed < $2436 requires pre-cert     Patient Name: Cristy Coronel  YOB: 1940     Date of [address] Name $$$ charge Daily Charge YTD   Total $   7/9/18 eval 81.88 81.88 81.88   7/11/18 Ex x 2 29.97 x2 59.94 141.82   7/13/18 Ex x3 29.97 x3 89.91 231.73   7/18/18 Ex x 3 29.97 x3 89.91 321.64   7/23/18 Ex x 3 29.97 x 3 89.91 411.55   7/25/18 Ex x 3 29.97 x3 89.91 501.46   7/27/18 Ex x 3 29.97 x3 89.91 591.37   7/30/18 Ex x 3 29.97 x3 89.91 681.28   8/1/18 Ex x 3

## 2018-08-01 NOTE — PRE-CERTIFICATION NOTE
Medicare Cap     [] Physical Therapy  [] Speech Therapy  [] Occupational therapy    *PT and Speech caps combine      $1940 Cap limit < kx modifier needed < $1720 requires pre-cert     Patient Name: Chrissie Francis  YOB: 1940     Date of [address] Name $$$ charge Daily Charge YTD   Total $   7/9/18 eval 81.88 81.88 81.88   7/11/18 Ex x 2 29.97 x2 59.94 141.82   7/13/18 Ex x3 29.97 x3 89.91 231.73   7/18/18 Ex x 3 29.97 x3 89.91 321.64   7/23/18 Ex x 3 29.97 x 3 89.91 411.55   7/25/18 Ex x 3 29.97 x3 89.91 501.46   7/27/18 Ex x 3 29.97 x3 89.91 591.37   7/30/18 Ex x 3 29.97 x3 89.91 681.28   8/1/18 Ex x 3 29.97 x3 89.91 771.19

## 2018-08-06 ENCOUNTER — HOSPITAL ENCOUNTER (OUTPATIENT)
Age: 78
Discharge: HOME | End: 2018-08-06
Payer: MEDICARE

## 2018-08-06 ENCOUNTER — HOSPITAL ENCOUNTER (OUTPATIENT)
Age: 78
Discharge: HOME | End: 2018-08-08
Payer: MEDICARE

## 2018-08-06 ENCOUNTER — HOSPITAL ENCOUNTER (OUTPATIENT)
Dept: GENERAL RADIOLOGY | Age: 78
Discharge: HOME | End: 2018-08-08
Payer: MEDICARE

## 2018-08-06 ENCOUNTER — HOSPITAL ENCOUNTER (OUTPATIENT)
Dept: PHYSICAL THERAPY | Age: 78
Setting detail: THERAPIES SERIES
Discharge: HOME | End: 2018-08-06
Payer: MEDICARE

## 2018-08-06 DIAGNOSIS — E55.9 VITAMIN D DEFICIENCY: ICD-10-CM

## 2018-08-06 DIAGNOSIS — R07.89 ATYPICAL CHEST PAIN: ICD-10-CM

## 2018-08-06 DIAGNOSIS — Z01.818 PRE-OP TESTING: ICD-10-CM

## 2018-08-06 DIAGNOSIS — R94.31 ABNORMAL EKG: ICD-10-CM

## 2018-08-06 DIAGNOSIS — R06.02 SOB (SHORTNESS OF BREATH): ICD-10-CM

## 2018-08-06 DIAGNOSIS — R53.83 FATIGUE, UNSPECIFIED TYPE: ICD-10-CM

## 2018-08-06 DIAGNOSIS — Z13.220 SCREENING FOR HYPERLIPIDEMIA: ICD-10-CM

## 2018-08-06 LAB
ABSOLUTE EOS #: 0.1 K/UL (ref 0–0.4)
ABSOLUTE IMMATURE GRANULOCYTE: ABNORMAL K/UL (ref 0–0.3)
ABSOLUTE LYMPH #: 0.9 K/UL (ref 1–4.8)
ABSOLUTE MONO #: 0.6 K/UL (ref 0–1)
ALBUMIN SERPL-MCNC: 4.2 G/DL (ref 3.5–5.2)
ALBUMIN/GLOBULIN RATIO: ABNORMAL (ref 1–2.5)
ALP BLD-CCNC: 67 U/L (ref 35–104)
ALT SERPL-CCNC: 15 U/L (ref 5–33)
ANION GAP SERPL CALCULATED.3IONS-SCNC: 12 MMOL/L (ref 9–17)
AST SERPL-CCNC: 16 U/L
BASOPHILS # BLD: 1 % (ref 0–2)
BASOPHILS ABSOLUTE: 0 K/UL (ref 0–0.2)
BILIRUB SERPL-MCNC: 0.42 MG/DL (ref 0.3–1.2)
BUN BLDV-MCNC: 16 MG/DL (ref 8–23)
BUN/CREAT BLD: 23 (ref 9–20)
CALCIUM SERPL-MCNC: 9.2 MG/DL (ref 8.6–10.4)
CHLORIDE BLD-SCNC: 105 MMOL/L (ref 98–107)
CHOLESTEROL/HDL RATIO: 3.4
CHOLESTEROL: 210 MG/DL
CO2: 25 MMOL/L (ref 20–31)
CREAT SERPL-MCNC: 0.7 MG/DL (ref 0.5–0.9)
DIFFERENTIAL TYPE: YES
EKG ATRIAL RATE: 62 BPM
EKG P AXIS: 57 DEGREES
EKG P-R INTERVAL: 142 MS
EKG Q-T INTERVAL: 398 MS
EKG QRS DURATION: 84 MS
EKG QTC CALCULATION (BAZETT): 403 MS
EKG R AXIS: 2 DEGREES
EKG T AXIS: 30 DEGREES
EKG VENTRICULAR RATE: 62 BPM
EOSINOPHILS RELATIVE PERCENT: 2 % (ref 0–5)
GFR AFRICAN AMERICAN: >60 ML/MIN
GFR NON-AFRICAN AMERICAN: >60 ML/MIN
GFR SERPL CREATININE-BSD FRML MDRD: ABNORMAL ML/MIN/{1.73_M2}
GFR SERPL CREATININE-BSD FRML MDRD: ABNORMAL ML/MIN/{1.73_M2}
GLUCOSE BLD-MCNC: 102 MG/DL (ref 70–99)
HCT VFR BLD CALC: 41 % (ref 36–46)
HDLC SERPL-MCNC: 62 MG/DL
HEMOGLOBIN: 13.9 G/DL (ref 12–16)
IMMATURE GRANULOCYTES: ABNORMAL %
LDL CHOLESTEROL: 129 MG/DL (ref 0–130)
LYMPHOCYTES # BLD: 13 % (ref 15–40)
MAGNESIUM: 2.2 MG/DL (ref 1.6–2.6)
MCH RBC QN AUTO: 29.4 PG (ref 26–34)
MCHC RBC AUTO-ENTMCNC: 34 G/DL (ref 31–37)
MCV RBC AUTO: 86.7 FL (ref 80–100)
MONOCYTES # BLD: 9 % (ref 4–8)
NRBC AUTOMATED: ABNORMAL PER 100 WBC
PDW BLD-RTO: 14.3 % (ref 12.1–15.2)
PLATELET # BLD: 268 K/UL (ref 140–450)
PLATELET ESTIMATE: ABNORMAL
PMV BLD AUTO: ABNORMAL FL (ref 6–12)
POTASSIUM SERPL-SCNC: 4.5 MMOL/L (ref 3.7–5.3)
RBC # BLD: 4.73 M/UL (ref 4–5.2)
RBC # BLD: ABNORMAL 10*6/UL
SEG NEUTROPHILS: 75 % (ref 47–75)
SEGMENTED NEUTROPHILS ABSOLUTE COUNT: 5.3 K/UL (ref 2.5–7)
SODIUM BLD-SCNC: 142 MMOL/L (ref 135–144)
TOTAL PROTEIN: 6.3 G/DL (ref 6.4–8.3)
TRIGL SERPL-MCNC: 97 MG/DL
TSH SERPL DL<=0.05 MIU/L-ACNC: 1.14 MIU/L (ref 0.3–5)
VITAMIN D 25-HYDROXY: 42.2 NG/ML (ref 30–100)
VLDLC SERPL CALC-MCNC: ABNORMAL MG/DL (ref 1–30)
WBC # BLD: 6.9 K/UL (ref 3.5–11)
WBC # BLD: ABNORMAL 10*3/UL

## 2018-08-06 PROCEDURE — 84443 ASSAY THYROID STIM HORMONE: CPT

## 2018-08-06 PROCEDURE — 85025 COMPLETE CBC W/AUTO DIFF WBC: CPT

## 2018-08-06 PROCEDURE — 83735 ASSAY OF MAGNESIUM: CPT

## 2018-08-06 PROCEDURE — 71046 X-RAY EXAM CHEST 2 VIEWS: CPT

## 2018-08-06 PROCEDURE — 82306 VITAMIN D 25 HYDROXY: CPT

## 2018-08-06 PROCEDURE — 80061 LIPID PANEL: CPT

## 2018-08-06 PROCEDURE — 36415 COLL VENOUS BLD VENIPUNCTURE: CPT

## 2018-08-06 PROCEDURE — 97110 THERAPEUTIC EXERCISES: CPT

## 2018-08-06 PROCEDURE — 93005 ELECTROCARDIOGRAM TRACING: CPT

## 2018-08-06 PROCEDURE — 80053 COMPREHEN METABOLIC PANEL: CPT

## 2018-08-06 ASSESSMENT — PAIN SCALES - GENERAL: PAINLEVEL_OUTOF10: 3

## 2018-08-06 NOTE — PRE-CERTIFICATION NOTE
Medicare Cap     [] Physical Therapy  [] Speech Therapy  [] Occupational therapy    *PT and Speech caps combine      $1940 Cap limit < kx modifier needed < $8127 requires pre-cert     Patient Name: Franklin Sun  YOB: 1940     Date of [address] Name $$$ charge Daily Charge YTD   Total $   7/9/18 eval 81.88 81.88 81.88   7/11/18 Ex x 2 29.97 x2 59.94 141.82   7/13/18 Ex x3 29.97 x3 89.91 231.73   7/18/18 Ex x 3 29.97 x3 89.91 321.64   7/23/18 Ex x 3 29.97 x 3 89.91 411.55   7/25/18 Ex x 3 29.97 x3 89.91 501.46   7/27/18 Ex x 3 29.97 x3 89.91 591.37   7/30/18 Ex x 3 29.97 x3 89.91 681.28   8/1/18 Ex x 3 29.97 x3 89.91 771.19   8/6/18 Ex x 3 29.97 x 3 89.91 861.10

## 2018-08-08 ENCOUNTER — HOSPITAL ENCOUNTER (OUTPATIENT)
Dept: PHYSICAL THERAPY | Age: 78
Setting detail: THERAPIES SERIES
Discharge: HOME | End: 2018-08-08
Payer: MEDICARE

## 2018-08-08 PROCEDURE — 97110 THERAPEUTIC EXERCISES: CPT

## 2018-08-08 ASSESSMENT — PAIN SCALES - GENERAL: PAINLEVEL_OUTOF10: 6

## 2018-08-10 ENCOUNTER — HOSPITAL ENCOUNTER (OUTPATIENT)
Dept: PHYSICAL THERAPY | Age: 78
Setting detail: THERAPIES SERIES
Discharge: HOME | End: 2018-08-10
Payer: MEDICARE

## 2018-08-10 PROCEDURE — G8979 MOBILITY GOAL STATUS: HCPCS

## 2018-08-10 PROCEDURE — G8980 MOBILITY D/C STATUS: HCPCS

## 2018-08-10 PROCEDURE — 97110 THERAPEUTIC EXERCISES: CPT

## 2018-08-10 NOTE — PRE-CERTIFICATION NOTE
Medicare Cap     [] Physical Therapy  [] Speech Therapy  [] Occupational therapy    *PT and Speech caps combine      $1940 Cap limit < kx modifier needed < $2475 requires pre-cert     Patient Name: Ruben Klein  YOB: 1940     Date of [address] Name $$$ charge Daily Charge YTD   Total $   7/9/18 eval 81.88 81.88 81.88   7/11/18 Ex x 2 29.97 x2 59.94 141.82   7/13/18 Ex x3 29.97 x3 89.91 231.73   7/18/18 Ex x 3 29.97 x3 89.91 321.64   7/23/18 Ex x 3 29.97 x 3 89.91 411.55   7/25/18 Ex x 3 29.97 x3 89.91 501.46   7/27/18 Ex x 3 29.97 x3 89.91 591.37   7/30/18 Ex x 3 29.97 x3 89.91 681.28   8/1/18 Ex x 3 29.97 x3 89.91 771.19   8/6/18 Ex x 3 29.97 x 3 89.91 861.10   8/8/18 Ex x3 29.97 x3 89.91 951.01   8/10/18 Ex x 2 29.97 x2 59.94 1010.95

## 2018-08-30 ENCOUNTER — OFFICE VISIT (OUTPATIENT)
Dept: CARDIOLOGY CLINIC | Age: 78
End: 2018-08-30
Payer: MEDICARE

## 2018-08-30 VITALS
WEIGHT: 161 LBS | BODY MASS INDEX: 29.45 KG/M2 | HEART RATE: 86 BPM | SYSTOLIC BLOOD PRESSURE: 130 MMHG | OXYGEN SATURATION: 96 % | DIASTOLIC BLOOD PRESSURE: 66 MMHG

## 2018-08-30 DIAGNOSIS — R94.31 ABNORMAL EKG: Primary | ICD-10-CM

## 2018-08-30 PROCEDURE — G8400 PT W/DXA NO RESULTS DOC: HCPCS | Performed by: INTERNAL MEDICINE

## 2018-08-30 PROCEDURE — G8419 CALC BMI OUT NRM PARAM NOF/U: HCPCS | Performed by: INTERNAL MEDICINE

## 2018-08-30 PROCEDURE — 99204 OFFICE O/P NEW MOD 45 MIN: CPT | Performed by: INTERNAL MEDICINE

## 2018-08-30 PROCEDURE — G8427 DOCREV CUR MEDS BY ELIG CLIN: HCPCS | Performed by: INTERNAL MEDICINE

## 2018-08-30 PROCEDURE — 4040F PNEUMOC VAC/ADMIN/RCVD: CPT | Performed by: INTERNAL MEDICINE

## 2018-08-30 PROCEDURE — 1090F PRES/ABSN URINE INCON ASSESS: CPT | Performed by: INTERNAL MEDICINE

## 2018-08-30 PROCEDURE — 1101F PT FALLS ASSESS-DOCD LE1/YR: CPT | Performed by: INTERNAL MEDICINE

## 2018-08-30 PROCEDURE — 1036F TOBACCO NON-USER: CPT | Performed by: INTERNAL MEDICINE

## 2018-08-30 PROCEDURE — 1123F ACP DISCUSS/DSCN MKR DOCD: CPT | Performed by: INTERNAL MEDICINE

## 2018-08-30 NOTE — LETTER
Johnathan Fritz M.D. [address] [place] [address]  [phone]          2018          Felizjacqueline Acevedo. Franki Wilkinson MD  [address] [facility], [address] [facility]        RE:   Giselle Hutton  :  1940      Dear Dr. Franki Wilkinson:    CHIEF COMPLAINT:  Cardiac clearance for left knee replacement. HISTORY OF PRESENT ILLNESS:  Mrs. Hutton is a pleasant 66-year-old female who has never had a cardiac event. She does have a family history of coronary artery disease with her brother having an MI with angioplasty. I saw her last in  as part of a preoperative clearance for a prolapsed uterus. She has had no cardiac events. No history of myocardial infarction. No chest pain or chest discomfort. No unusual shortness of breath. She has mild fatigue but remains very active. In , she had an EKG, which showed mild nonspecific ST changes. Her latest EKG done on 2018 was normal.    She has had no fevers, sweats or chills. No weight loss or weight gain. No change in bowel habits, no blood in stools. No pedal edema, PND, orthopnea, palpitations, syncope or near syncope. CARDIAC RISK FACTORS:  Prior MI:  Negative. Other Family Members:  Positive. Peripheral Vascular Disease:  Negative. Smoking:  Negative. Diabetes:  Negative. Hypertension:  Negative. MEDICATIONS AT HOME:  She is currently on no medications. PAST MEDICAL HISTORY:  1. Appendectomy in 1963.  2.  Carpal tunnel surgery in .  3.  Hysterectomy many years ago and surgery for prolapsed uterus in  for posterior repair of prolapse. 4.  She has arthritis in both knees and is pending a left knee replacement. FAMILY HISTORY:  Brother had an MI at 72. Mother  of an MI.    SOCIAL HISTORY:  She is 66years old, , 2 children. Does not smoke or drink alcohol. Remains active. 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 REVIEW OF SYSTEMS:  Cardiac as above. Other systems reviewed including constitutional, eyes, ears, nose and throat, cardiovascular, respiratory, GI, , musculoskeletal, integumentary, neurologic, psychiatric, endocrine, hematologic and allergic/immunologic. These are all negative except for what is described above. PHYSICAL EXAMINATION:  VITAL SIGNS:  Her blood pressure was 142/70 with a heart rate of 70 and regular. Respiratory rate 18. O2 saturation 98%. Weight 161 pounds. GENERAL:  She is a pleasant 24-year-old female. Denied pain. She was oriented to person, place and time. Answered questions appropriately. SKIN:  No unusual skin changes. HEENT:  The pupils are equally round and reactive to light and accommodation. Extraocular movements were intact. Mucous membranes were dry. NECK:  No JVD. Good carotid pulses. No carotid bruits. No lymphadenopathy or thyromegaly. CARDIOVASCULAR EXAM:  S1 and S2 were normal.  No S3 or S4. Soft systolic blowing type murmur. No diastolic murmur. PMI was normal.  No lift, thrust, or pericardial friction rub. LUNGS:  Quite clear to auscultation and percussion. ABDOMEN:  Soft and nontender. Good bowel sounds. The aorta was not enlarged. No hepatomegaly, splenomegaly. EXTREMITIES:  Good femoral pulses. Good pedal pulses. No pedal edema. Skin was warm and dry. No calf tenderness. Nail beds pink. Good cap refill. PULSES:  Bilateral symmetrical radial, brachial and carotid pulses. No carotid bruits. Good femoral and pedal pulses. NEUROLOGIC EXAM:  Within normal limits. PSYCHIATRIC EXAM:  Within normal limits. LABORATORY DATA:  On 08/06/2018, sodium 142, potassium 4.5, BUN 16, creatinine 0.70, GFR was 60, magnesium 2.2, glucose 102, calcium 9.2.

## 2018-09-17 NOTE — PROGRESS NOTES
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 REVIEW OF SYSTEMS:  Cardiac as above. Other systems reviewed including constitutional, eyes, ears, nose and throat, cardiovascular, respiratory, GI, , musculoskeletal, integumentary, neurologic, psychiatric, endocrine, hematologic and allergic/immunologic. These are all negative except for what is described above. PHYSICAL EXAMINATION:  VITAL SIGNS:  Her blood pressure was 142/70 with a heart rate of 70 and regular. Respiratory rate 18. O2 saturation 98%. Weight 161 pounds. GENERAL:  She is a pleasant 79-year-old female. Denied pain. She was oriented to person, place and time. Answered questions appropriately. SKIN:  No unusual skin changes. HEENT:  The pupils are equally round and reactive to light and accommodation. Extraocular movements were intact. Mucous membranes were dry. NECK:  No JVD. Good carotid pulses. No carotid bruits. No lymphadenopathy or thyromegaly. CARDIOVASCULAR EXAM:  S1 and S2 were normal.  No S3 or S4. Soft systolic blowing type murmur. No diastolic murmur. PMI was normal.  No lift, thrust, or pericardial friction rub. LUNGS:  Quite clear to auscultation and percussion. ABDOMEN:  Soft and nontender. Good bowel sounds. The aorta was not enlarged. No hepatomegaly, splenomegaly. EXTREMITIES:  Good femoral pulses. Good pedal pulses. No pedal edema. Skin was warm and dry. No calf tenderness. Nail beds pink. Good cap refill. PULSES:  Bilateral symmetrical radial, brachial and carotid pulses. No carotid bruits. Good femoral and pedal pulses. NEUROLOGIC EXAM:  Within normal limits. PSYCHIATRIC EXAM:  Within normal limits. LABORATORY DATA:  On 08/06/2018, sodium 142, potassium 4.5, BUN 16, creatinine 0.70, GFR was 60, magnesium 2.2, glucose 102, calcium 9.2. Cholesterol 210 with an HDL of 62, , triglycerides 97.   ALT was 15,

## 2018-10-15 ENCOUNTER — HOSPITAL ENCOUNTER (OUTPATIENT)
Dept: PHYSICAL THERAPY | Age: 78
Setting detail: THERAPIES SERIES
Discharge: HOME | End: 2018-10-15
Payer: MEDICARE

## 2018-10-15 PROCEDURE — G8979 MOBILITY GOAL STATUS: HCPCS

## 2018-10-15 PROCEDURE — 97110 THERAPEUTIC EXERCISES: CPT

## 2018-10-15 PROCEDURE — G8978 MOBILITY CURRENT STATUS: HCPCS

## 2018-10-15 PROCEDURE — 97161 PT EVAL LOW COMPLEX 20 MIN: CPT

## 2018-10-15 ASSESSMENT — PAIN DESCRIPTION - LOCATION: LOCATION: KNEE

## 2018-10-15 ASSESSMENT — PAIN DESCRIPTION - ORIENTATION: ORIENTATION: LEFT

## 2018-10-15 ASSESSMENT — PAIN SCALES - GENERAL: PAINLEVEL_OUTOF10: 4

## 2018-10-15 NOTE — PLAN OF CARE
[place] [facility]       Phone: [phone]   Date: 10/15/2018                      Outpatient Physical Therapy  Fax: [phone]    ACCT #: [de-identified]                     Plan of Care  [facility]#: [id number]  Patient: Marybeth Faulkner  : 1940    Referring Practitioner: Dr Chandler Roberts    Referral Date : 10/12/18    Diagnosis: TKA Left  Onset Date: 18  Treatment Diagnosis: Left knee pain, S/P TKA    Assessment  Body structures, Functions, Activity limitations: Decreased functional mobility , Decreased ROM, Decreased strength, Decreased balance  Prognosis: Good  [x] Primary Impairment : Mobility [] Secondary Impairment : NA        G Code:    [x] Mobility         [] Carry        [] Body Position       [] Self Care      [] Other:   Functional Impairment Current:  [] 0%    [] 1-19% [] 20-39% [] 40-59% [x] 60-79%    [] 80-99% [] 100%    Treatment Plan   Days: 3 times per week Weeks: 4 weeks Total # of Visits Approved: 12  [] HP/CP     [] Electrical Stimulation   [x]  Therapeutic Exercise   [x]  Gait Training  [] Traction   [] Ultrasound                   []  Massage                        []  Work Conditioning   [] Aquatics  [] Back Education            []  Therapeutic Activity [x]  Manual Therapy  [x]  Patient Education/HEP []  [facility] Therapy     Goals  Short term goals  Time Frame for Short term goals: 8  Short term goal 1: Increase PROM left knee flexion 110 degrees  Short term goal 2: Increase AROM left knee extension 3 degrees  Short term goal 3: Patient to ambulate with cane independently   Long term goals  Time Frame for Long term goals : 12  Long term goal 1: Patient to ambulate indoors without device   Long term goal 2:  Increase strength left knee extension 4+/5 to ambulate up and down stairs alternating feet  Long term goal 3: Improve functional mobility with score of 60/80 or better on LEFS     Laureen Cain PT     Date:

## 2018-10-17 ENCOUNTER — HOSPITAL ENCOUNTER (OUTPATIENT)
Dept: PHYSICAL THERAPY | Age: 78
Setting detail: THERAPIES SERIES
Discharge: HOME | End: 2018-10-17
Payer: MEDICARE

## 2018-10-17 PROCEDURE — 97110 THERAPEUTIC EXERCISES: CPT

## 2018-10-17 ASSESSMENT — PAIN SCALES - GENERAL: PAINLEVEL_OUTOF10: 3

## 2018-10-19 ENCOUNTER — HOSPITAL ENCOUNTER (OUTPATIENT)
Dept: PHYSICAL THERAPY | Age: 78
Setting detail: THERAPIES SERIES
Discharge: HOME | End: 2018-10-19
Payer: MEDICARE

## 2018-10-19 PROCEDURE — 97110 THERAPEUTIC EXERCISES: CPT

## 2018-10-19 ASSESSMENT — PAIN SCALES - GENERAL: PAINLEVEL_OUTOF10: 3

## 2018-10-19 NOTE — PROGRESS NOTES
Phone: [address]      Fax: [phone]                            Outpatient Physical Therapy                                                                            Daily Note    Date: 10/19/2018  Patient Name: Marybeth Faulkner        MRN: [id number]   ACCT#:  [de-identified]  : 1940  (66 y.o.)    Referring Practitioner: Dr Chandler Roberts    Referral Date : 10/12/18    Diagnosis: TKA Left  Treatment Diagnosis: Left knee pain, S/P TKA    Onset Date: 18  PT Insurance Information: Medicare  Total # of Visits Approved: 12 Per Physician Order  Total # of Visits to Date: 3  No Show: 0  Canceled Appointment: 0    Pre-Treatment Pain:  3/10     Assessment  Assessment: Rates pain prior to session as 3/10. Ambulates into department with straight cane without LOB. PROM L knee 5-98 degrees. C/o \"burning\" in front of L knee during PROM for extension. Compliance noted with current HEP. Chart Reviewed: Yes    Plan  Plan: Continue with current plan    Exercises/Modalities/Manual:  See DocFlow Sheet    Education:        Goals  (Total # of Visits to Date: 3)   Short Term Goals - Time Frame for Short term goals: 8  Short term goal 1: Increase PROM left knee flexion 110 degrees  Short term goal 2: Increase AROM left knee extension 3 degrees  Short term goal 3: Patient to ambulate with cane independently           Long Term Goals - Time Frame for Long term goals : 12  Long term goal 1: Patient to ambulate indoors without device   Long term goal 2:  Increase strength left knee extension 4+/5 to ambulate up and down stairs alternating feet  Long term goal 3: Improve functional mobility with score of 60/80 or better on LEFS          Post Treatment Pain: 10    Time In:0840    Time Out : 09        Timed Code Treatment Minutes: 43 Minutes  Total Treatment Time: [facility] [employer] [facility] License Number: [id number]    Date: 10/19/2018

## 2018-10-19 NOTE — PRE-CERTIFICATION NOTE
Medicare Cap     [] Physical Therapy  [] Speech Therapy  [] Occupational therapy    *PT and Speech caps combine      $1940 Cap limit < kx modifier needed < $8315 requires pre-cert     Patient Name: Heidi Tian  YOB: 1940     Date of M[address] Name $$$ charge Daily Charge YTD   Total $   7/9/18 eval 81.88 81.88 81.88   7/11/18 Ex x 2 29.97 x2 59.94 141.82   7/13/18 Ex x3 29.97 x3 89.91 231.73   7/18/18 Ex x 3 29.97 x3 89.91 321.64   7/23/18 Ex x 3 29.97 x 3 89.91 411.55   7/25/18 Ex x 3 29.97 x3 89.91 501.46   7/27/18 Ex x 3 29.97 x3 89.91 591.37   7/30/18 Ex x 3 29.97 x3 89.91 681.28   8/1/18 Ex x 3 29.97 x3 89.91 771.19   8/6/18 Ex x 3 29.97 x 3 89.91 861.10   8/8/18 Ex x3 29.97 x3 89.91 951.01   8/10/18 Ex x 2 29.97 x2 59.94 1010.95   10/15/18 Eval, ex 81.88, 29.97 111.85 1122.80   10/17/18 Ex x 3 29.97 x3 89.91 1212.71   10/19/18 Ex x3 29.97 x3 89.91 1302.62

## 2018-10-22 ENCOUNTER — TELEPHONE (OUTPATIENT)
Dept: FAMILY MEDICINE CLINIC | Age: 78
End: 2018-10-22

## 2018-10-22 ENCOUNTER — HOSPITAL ENCOUNTER (OUTPATIENT)
Dept: PHYSICAL THERAPY | Age: 78
Setting detail: THERAPIES SERIES
Discharge: HOME | End: 2018-10-22
Payer: MEDICARE

## 2018-10-22 DIAGNOSIS — M25.562 ACUTE PAIN OF LEFT KNEE: Primary | ICD-10-CM

## 2018-10-22 PROCEDURE — 97110 THERAPEUTIC EXERCISES: CPT

## 2018-10-22 RX ORDER — TRAMADOL HYDROCHLORIDE 50 MG/1
50 TABLET ORAL EVERY 6 HOURS PRN
Qty: 28 TABLET | Refills: 0 | Status: SHIPPED | OUTPATIENT
Start: 2018-10-22 | End: 2018-10-29

## 2018-10-22 ASSESSMENT — PAIN SCALES - GENERAL: PAINLEVEL_OUTOF10: 5

## 2018-10-24 ENCOUNTER — HOSPITAL ENCOUNTER (OUTPATIENT)
Dept: PHYSICAL THERAPY | Age: 78
Setting detail: THERAPIES SERIES
Discharge: HOME | End: 2018-10-24
Payer: MEDICARE

## 2018-10-24 PROCEDURE — 97140 MANUAL THERAPY 1/> REGIONS: CPT

## 2018-10-24 PROCEDURE — 97110 THERAPEUTIC EXERCISES: CPT

## 2018-10-24 ASSESSMENT — PAIN DESCRIPTION - ORIENTATION: ORIENTATION: LEFT

## 2018-10-24 ASSESSMENT — PAIN DESCRIPTION - LOCATION: LOCATION: KNEE

## 2018-10-24 ASSESSMENT — PAIN SCALES - GENERAL: PAINLEVEL_OUTOF10: 3

## 2018-10-26 ENCOUNTER — HOSPITAL ENCOUNTER (OUTPATIENT)
Dept: PHYSICAL THERAPY | Age: 78
Setting detail: THERAPIES SERIES
Discharge: HOME | End: 2018-10-26
Payer: MEDICARE

## 2018-10-26 PROCEDURE — 97110 THERAPEUTIC EXERCISES: CPT

## 2018-10-26 ASSESSMENT — PAIN SCALES - GENERAL: PAINLEVEL_OUTOF10: 3

## 2018-10-29 ENCOUNTER — HOSPITAL ENCOUNTER (OUTPATIENT)
Dept: PHYSICAL THERAPY | Age: 78
Setting detail: THERAPIES SERIES
Discharge: HOME | End: 2018-10-29
Payer: MEDICARE

## 2018-10-29 PROCEDURE — G8979 MOBILITY GOAL STATUS: HCPCS

## 2018-10-29 PROCEDURE — G8978 MOBILITY CURRENT STATUS: HCPCS

## 2018-10-29 PROCEDURE — 97110 THERAPEUTIC EXERCISES: CPT

## 2018-10-29 ASSESSMENT — PAIN DESCRIPTION - ORIENTATION: ORIENTATION: LEFT

## 2018-10-29 ASSESSMENT — PAIN DESCRIPTION - LOCATION: LOCATION: KNEE

## 2018-10-29 ASSESSMENT — PAIN SCALES - GENERAL: PAINLEVEL_OUTOF10: 3

## 2018-10-31 ENCOUNTER — HOSPITAL ENCOUNTER (OUTPATIENT)
Dept: PHYSICAL THERAPY | Age: 78
Setting detail: THERAPIES SERIES
Discharge: HOME | End: 2018-10-31
Payer: MEDICARE

## 2018-10-31 PROCEDURE — 97110 THERAPEUTIC EXERCISES: CPT

## 2018-10-31 ASSESSMENT — PAIN SCALES - GENERAL: PAINLEVEL_OUTOF10: 3

## 2018-10-31 NOTE — PROGRESS NOTES
Phone: [address]      Fax: [phone]                            Outpatient Physical Therapy                                                                            Daily Note    Date: 10/31/2018  Patient Name: Isac Jones        MRN: [id number]   ACCT#:  [de-identified]  : 1940  (66 y.o.)    Referring Practitioner: Dr Sydnie Aviles    Referral Date : 10/12/18    Diagnosis: TKA Left  Treatment Diagnosis: Left knee pain, S/P TKA    Onset Date: 18  PT Insurance Information: Medicare  Total # of Visits Approved: 12 Per Physician Order  Total # of Visits to Date: 8  No Show: 0  Canceled Appointment: 0    Pre-Treatment Pain:  3/10     Assessment  Assessment: Pt had another episode of her legs \"jumping\" all night with very little sleep. Performed ex as outlined with increased reps to 20 on single leg shuttle. PROM  5-110 degrees. Will cont. Chart Reviewed: Yes    Plan  Plan: Continue with current plan    Exercises/Modalities/Manual:  See DocFlow Sheet    Goals  (Total # of Visits to Date: 8)   Short Term Goals - Time Frame for Short term goals: 8  Short term goal 1: Increase PROM left knee flexion 110 degrees-Not Met  Short term goal 2: Increase AROM left knee extension 3 degrees-Not Met  Short term goal 3: Patient to ambulate with cane independently -Met          Long Term Goals - Time Frame for Long term goals : 12  Long term goal 1: Patient to ambulate indoors without device -Met  Long term goal 2:  Increase strength left knee extension 4+/5 to ambulate up and down stairs alternating feet  Long term goal 3: Improve functional mobility with score of 60/80 or better on LEFS    Post Treatment Pain:  3/10    Time In: 0900    Time Out : 09        Timed Code Treatment Minutes: 43 Minutes  Total Treatment Time: [address] Therapy License Number: [id number]    Date: 10/31/2018

## 2018-10-31 NOTE — PRE-CERTIFICATION NOTE
Medicare Cap     [] Physical Therapy  [] Speech Therapy  [] Occupational therapy    *PT and Speech caps combine      $1940 Cap limit < kx modifier needed < $3630 requires pre-cert     Patient Name: Radha Santoro  YOB: 1940     Date of M[address] 63 Name $$$ charge Daily Charge YTD   Total $   7/9/18 eval 81.88 81.88 81.88   7/11/18 Ex x 2 29.97 x2 59.94 141.82   7/13/18 Ex x3 29.97 x3 89.91 231.73   7/18/18 Ex x 3 29.97 x3 89.91 321.64   7/23/18 Ex x 3 29.97 x 3 89.91 411.55   7/25/18 Ex x 3 29.97 x3 89.91 501.46   7/27/18 Ex x 3 29.97 x3 89.91 591.37   7/30/18 Ex x 3 29.97 x3 89.91 681.28   8/1/18 Ex x 3 29.97 x3 89.91 771.19   8/6/18 Ex x 3 29.97 x 3 89.91 861.10   8/8/18 Ex x3 29.97 x3 89.91 951.01   8/10/18 Ex x 2 29.97 x2 59.94 1010.95   10/15/18 Eval, ex 81.88, 29.97 111.85 1122.80   10/17/18 Ex x 3 29.97 x3 89.91 1212.71   10/19/18 Ex x3 29.97 x3 89.91 1302.62   10/22/18 Ex x 2 29.97 x 2  59.94 1362.56   10/24/18 Ex x3 29.97 x3 89.91 1452.47   10/26/18 Ex x3 29.97 x3 89.91 1542.38   10/29/18 Ex x 3 29.97 x 3 89.91 1632.29   10/31/18 Ex x 3

## 2018-11-01 ENCOUNTER — OFFICE VISIT (OUTPATIENT)
Dept: FAMILY MEDICINE CLINIC | Age: 78
End: 2018-11-01
Payer: MEDICARE

## 2018-11-01 VITALS
OXYGEN SATURATION: 98 % | BODY MASS INDEX: 29.08 KG/M2 | WEIGHT: 159 LBS | SYSTOLIC BLOOD PRESSURE: 136 MMHG | DIASTOLIC BLOOD PRESSURE: 62 MMHG | HEART RATE: 80 BPM

## 2018-11-01 DIAGNOSIS — G25.81 RESTLESS LEG SYNDROME: Primary | ICD-10-CM

## 2018-11-01 PROCEDURE — 4040F PNEUMOC VAC/ADMIN/RCVD: CPT | Performed by: FAMILY MEDICINE

## 2018-11-01 PROCEDURE — G8484 FLU IMMUNIZE NO ADMIN: HCPCS | Performed by: FAMILY MEDICINE

## 2018-11-01 PROCEDURE — 1090F PRES/ABSN URINE INCON ASSESS: CPT | Performed by: FAMILY MEDICINE

## 2018-11-01 PROCEDURE — 1101F PT FALLS ASSESS-DOCD LE1/YR: CPT | Performed by: FAMILY MEDICINE

## 2018-11-01 PROCEDURE — 1036F TOBACCO NON-USER: CPT | Performed by: FAMILY MEDICINE

## 2018-11-01 PROCEDURE — G8400 PT W/DXA NO RESULTS DOC: HCPCS | Performed by: FAMILY MEDICINE

## 2018-11-01 PROCEDURE — 99213 OFFICE O/P EST LOW 20 MIN: CPT | Performed by: FAMILY MEDICINE

## 2018-11-01 PROCEDURE — G8427 DOCREV CUR MEDS BY ELIG CLIN: HCPCS | Performed by: FAMILY MEDICINE

## 2018-11-01 PROCEDURE — 1123F ACP DISCUSS/DSCN MKR DOCD: CPT | Performed by: FAMILY MEDICINE

## 2018-11-01 PROCEDURE — G8419 CALC BMI OUT NRM PARAM NOF/U: HCPCS | Performed by: FAMILY MEDICINE

## 2018-11-01 RX ORDER — PRAMIPEXOLE DIHYDROCHLORIDE 0.25 MG/1
0.25 TABLET ORAL NIGHTLY
Qty: 30 TABLET | Refills: 5 | Status: SHIPPED | OUTPATIENT
Start: 2018-11-01 | End: 2019-05-08 | Stop reason: SDUPTHER

## 2018-11-01 ASSESSMENT — ENCOUNTER SYMPTOMS
SHORTNESS OF BREATH: 0
EYE REDNESS: 0
EYE DISCHARGE: 0
COUGH: 0

## 2018-11-01 NOTE — PROGRESS NOTES
HPI Notes    Name: Aron Winters  : 1940        Chief Complaint:     Chief Complaint   Patient presents with    Fatigue     Pt had left knee replacement on 18, Pt c/o internal jerking sensation  in both legs worsening since surgery. Pt can't sleep at night. Pt states she goes to PT three times a week. History of Present Illness:     Aron Winters is a 66 y.o.  female who presents with Fatigue (Pt had left knee replacement on 18, Pt c/o internal jerking sensation  in both legs worsening since surgery. Pt can't sleep at night. Pt states she goes to PT three times a week.)      HPI  Restless legs - Pt states ever since her surgery --- Lt knee replacement on 18, she has has internal jerking sensation at night. Pt states she may have had some jerking during sleep in the past. But now the jerking sensation in both legs are keeping her up at night. Pt is fatigued due to her lack of sleep now at night. Pt is always a busy lady. 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 Pt has minimal swelling and is going to PT 3 times per week. Pt otherwise feels fine. SHe just wants something to help stop the restless leg sensation so she can sleep and then be less fatigued. Pt drinks plenty of water. Past Medical History:     Past Medical History:   Diagnosis Date    Urinary incontinence     bladder dropped      Reviewed all health maintenance requirements and ordered appropriate tests  Health Maintenance Due   Topic Date Due    DEXA (modify frequency per FRAX score)  2005    Flu vaccine (1) 2018       Past Surgical History:     Past Surgical History:   Procedure Laterality Date    APPENDECTOMY      CARPAL TUNNEL RELEASE      HYSTERECTOMY      KNEE ARTHROSCOPY      Bilat        Medications:       Prior to Admission medications    Medication Sig Start Date End Date Taking?  Authorizing Provider   Acetaminophen (TYLENOL) 325 MG CAPS Take by

## 2018-11-02 ENCOUNTER — HOSPITAL ENCOUNTER (OUTPATIENT)
Dept: PHYSICAL THERAPY | Age: 78
Setting detail: THERAPIES SERIES
Discharge: HOME | End: 2018-11-02
Payer: MEDICARE

## 2018-11-02 PROCEDURE — 97110 THERAPEUTIC EXERCISES: CPT

## 2018-11-02 ASSESSMENT — PAIN SCALES - GENERAL: PAINLEVEL_OUTOF10: 3

## 2018-11-02 NOTE — PRE-CERTIFICATION NOTE
Medicare Cap     [] Physical Therapy  [] Speech Therapy  [] Occupational therapy    *PT and Speech caps combine      $1940 Cap limit < kx modifier needed < $4452 requires pre-cert     Patient Name: Isis Padgett  YOB: 1940     Date of [address] Name $$$ charge Daily Charge YTD   Total $   7/9/18 eval 81.88 81.88 81.88   7/11/18 Ex x 2 29.97 x2 59.94 141.82   7/13/18 Ex x3 29.97 x3 89.91 231.73   7/18/18 Ex x 3 29.97 x3 89.91 321.64   7/23/18 Ex x 3 29.97 x 3 89.91 411.55   7/25/18 Ex x 3 29.97 x3 89.91 501.46   7/27/18 Ex x 3 29.97 x3 89.91 591.37   7/30/18 Ex x 3 29.97 x3 89.91 681.28   8/1/18 Ex x 3 29.97 x3 89.91 771.19   8/6/18 Ex x 3 29.97 x 3 89.91 861.10   8/8/18 Ex x3 29.97 x3 89.91 951.01   8/10/18 Ex x 2 29.97 x2 59.94 1010.95   10/15/18 Eval, ex 81.88, 29.97 111.85 1122.80   10/17/18 Ex x 3 29.97 x3 89.91 1212.71   10/19/18 Ex x3 29.97 x3 89.91 1302.62   10/22/18 Ex x 2 29.97 x 2  59.94 1362.56   10/24/18 Ex x3 29.97 x3 89.91 1452.47   10/26/18 Ex x3 29.97 x3 89.91 1542.38   10/29/18 Ex x 3 29.97 x 3 89.91 1632.29   10/31/18 Ex x 3 29.97 x 3 89.91 1722.20   11/2/18 Ex x 3

## 2018-11-02 NOTE — PRE-CERTIFICATION NOTE
Medicare Cap     [] Physical Therapy  [] Speech Therapy  [] Occupational therapy    *PT and Speech caps combine      $1940 Cap limit < kx modifier needed < $8744 requires pre-cert     Patient Name: Blanche Horne  YOB: 1940     Date of [address] Name $$$ charge Daily Charge YTD   Total $   7/9/18 eval 81.88 81.88 81.88   7/11/18 Ex x 2 29.97 x2 59.94 141.82   7/13/18 Ex x3 29.97 x3 89.91 231.73   7/18/18 Ex x 3 29.97 x3 89.91 321.64   7/23/18 Ex x 3 29.97 x 3 89.91 411.55   7/25/18 Ex x 3 29.97 x3 89.91 501.46   7/27/18 Ex x 3 29.97 x3 89.91 591.37   7/30/18 Ex x 3 29.97 x3 89.91 681.28   8/1/18 Ex x 3 29.97 x3 89.91 771.19   8/6/18 Ex x 3 29.97 x 3 89.91 861.10   8/8/18 Ex x3 29.97 x3 89.91 951.01   8/10/18 Ex x 2 29.97 x2 59.94 1010.95   10/15/18 Eval, ex 81.88, 29.97 111.85 1122.80   10/17/18 Ex x 3 29.97 x3 89.91 1212.71   10/19/18 Ex x3 29.97 x3 89.91 1302.62   10/22/18 Ex x 2 29.97 x 2  59.94 1362.56   10/24/18 Ex x3 29.97 x3 89.91 1452.47   10/26/18 Ex x3 29.97 x3 89.91 1542.38   10/29/18 Ex x 3 29.97 x 3 89.91 1632.29   10/31/18 Ex x 3 29.97 x 3 89.91 1722.20   11/2/18 Ex x 3 29.97 x3 89.91 1812.11

## 2018-11-05 ENCOUNTER — HOSPITAL ENCOUNTER (OUTPATIENT)
Dept: PHYSICAL THERAPY | Age: 78
Setting detail: THERAPIES SERIES
Discharge: HOME | End: 2018-11-05
Payer: MEDICARE

## 2018-11-05 PROCEDURE — 97110 THERAPEUTIC EXERCISES: CPT

## 2018-11-05 NOTE — PRE-CERTIFICATION NOTE
Medicare Cap     [] Physical Therapy  [] Speech Therapy  [] Occupational therapy    *PT and Speech caps combine      $1940 Cap limit < kx modifier needed < $6464 requires pre-cert     Patient Name: Rufino Schaefer  YOB: 1940     Date of [address] Name $$$ charge Daily Charge YTD   Total $   7/9/18 eval 81.88 81.88 81.88   7/11/18 Ex x 2 29.97 x2 59.94 141.82   7/13/18 Ex x3 29.97 x3 89.91 231.73   7/18/18 Ex x 3 29.97 x3 89.91 321.64   7/23/18 Ex x 3 29.97 x 3 89.91 411.55   7/25/18 Ex x 3 29.97 x3 89.91 501.46   7/27/18 Ex x 3 29.97 x3 89.91 591.37   7/30/18 Ex x 3 29.97 x3 89.91 681.28   8/1/18 Ex x 3 29.97 x3 89.91 771.19   8/6/18 Ex x 3 29.97 x 3 89.91 861.10   8/8/18 Ex x3 29.97 x3 89.91 951.01   8/10/18 Ex x 2 29.97 x2 59.94 1010.95   10/15/18 Eval, ex 81.88, 29.97 111.85 1122.80   10/17/18 Ex x 3 29.97 x3 89.91 1212.71   10/19/18 Ex x3 29.97 x3 89.91 1302.62   10/22/18 Ex x 2 29.97 x 2  59.94 1362.56   10/24/18 Ex x3 29.97 x3 89.91 1452.47   10/26/18 Ex x3 29.97 x3 89.91 1542.38   10/29/18 Ex x 3 29.97 x 3 89.91 1632.29   10/31/18 Ex x 3 29.97 x 3 89.91 1722.20   11/2/18 Ex x 3      11/5/18 Ex x 3

## 2018-11-05 NOTE — PRE-CERTIFICATION NOTE
Medicare Cap     [] Physical Therapy  [] Speech Therapy  [] Occupational therapy    *PT and Speech caps combine      $1940 Cap limit < kx modifier needed < $8068 requires pre-cert     Patient Name: Aron Winters  YOB: 1940     Date of [address] Name $$$ charge Daily Charge YTD   Total $   7/9/18 eval 81.88 81.88 81.88   7/11/18 Ex x 2 29.97 x2 59.94 141.82   7/13/18 Ex x3 29.97 x3 89.91 231.73   7/18/18 Ex x 3 29.97 x3 89.91 321.64   7/23/18 Ex x 3 29.97 x 3 89.91 411.55   7/25/18 Ex x 3 29.97 x3 89.91 501.46   7/27/18 Ex x 3 29.97 x3 89.91 591.37   7/30/18 Ex x 3 29.97 x3 89.91 681.28   8/1/18 Ex x 3 29.97 x3 89.91 771.19   8/6/18 Ex x 3 29.97 x 3 89.91 861.10   8/8/18 Ex x3 29.97 x3 89.91 951.01   8/10/18 Ex x 2 29.97 x2 59.94 1010.95   10/15/18 Eval, ex 81.88, 29.97 111.85 1122.80   10/17/18 Ex x 3 29.97 x3 89.91 1212.71   10/19/18 Ex x3 29.97 x3 89.91 1302.62   10/22/18 Ex x 2 29.97 x 2  59.94 1362.56   10/24/18 Ex x3 29.97 x3 89.91 1452.47   10/26/18 Ex x3 29.97 x3 89.91 1542.38   10/29/18 Ex x 3 29.97 x 3 89.91 1632.29   10/31/18 Ex x 3 29.97 x 3 89.91 1722.20   11/2/18 Ex x 3 29.97 x 3 89.91 1812.11   11/5/18 Ex x 3 29.97 x 3 89.91 1902.02

## 2018-11-07 ENCOUNTER — HOSPITAL ENCOUNTER (OUTPATIENT)
Dept: PHYSICAL THERAPY | Age: 78
Setting detail: THERAPIES SERIES
Discharge: HOME | End: 2018-11-07
Payer: MEDICARE

## 2018-11-07 PROCEDURE — 97110 THERAPEUTIC EXERCISES: CPT

## 2018-11-07 NOTE — PRE-CERTIFICATION NOTE
Medicare Cap     [] Physical Therapy  [] Speech Therapy  [] Occupational therapy    *PT and Speech caps combine      $1940 Cap limit < kx modifier needed < $7709 requires pre-cert     Patient Name: Cori Gonzalez  YOB: 1940     Date of [address] Name $$$ charge Daily Charge YTD   Total $   7/9/18 eval 81.88 81.88 81.88   7/11/18 Ex x 2 29.97 x2 59.94 141.82   7/13/18 Ex x3 29.97 x3 89.91 231.73   7/18/18 Ex x 3 29.97 x3 89.91 321.64   7/23/18 Ex x 3 29.97 x 3 89.91 411.55   7/25/18 Ex x 3 29.97 x3 89.91 501.46   7/27/18 Ex x 3 29.97 x3 89.91 591.37   7/30/18 Ex x 3 29.97 x3 89.91 681.28   8/1/18 Ex x 3 29.97 x3 89.91 771.19   8/6/18 Ex x 3 29.97 x 3 89.91 861.10   8/8/18 Ex x3 29.97 x3 89.91 951.01   8/10/18 Ex x 2 29.97 x2 59.94 1010.95   10/15/18 Eval, ex 81.88, 29.97 111.85 1122.80   10/17/18 Ex x 3 29.97 x3 89.91 1212.71   10/19/18 Ex x3 29.97 x3 89.91 1302.62   10/22/18 Ex x 2 29.97 x 2  59.94 1362.56   10/24/18 Ex x3 29.97 x3 89.91 1452.47   10/26/18 Ex x3 29.97 x3 89.91 1542.38   10/29/18 Ex x 3 29.97 x 3 89.91 1632.29   10/31/18 Ex x 3 29.97 x 3 89.91 1722.20   11/2/18 Ex x 3 29.97 x 3 89.91 1812.11   11/5/18 Ex x 3 29.97 x 3 89.91 1902.02   11/7/18 Ex x3 29.97 x3 89.91 1991.93.

## 2018-11-09 ENCOUNTER — HOSPITAL ENCOUNTER (OUTPATIENT)
Dept: PHYSICAL THERAPY | Age: 78
Setting detail: THERAPIES SERIES
Discharge: HOME | End: 2018-11-09
Payer: MEDICARE

## 2018-11-09 PROCEDURE — G8979 MOBILITY GOAL STATUS: HCPCS

## 2018-11-09 PROCEDURE — G8980 MOBILITY D/C STATUS: HCPCS

## 2018-11-09 PROCEDURE — G8978 MOBILITY CURRENT STATUS: HCPCS

## 2018-11-09 PROCEDURE — 97110 THERAPEUTIC EXERCISES: CPT

## 2018-11-09 NOTE — PRE-CERTIFICATION NOTE
Medicare Cap     [] Physical Therapy  [] Speech Therapy  [] Occupational therapy    *PT and Speech caps combine      $1940 Cap limit < kx modifier needed < $7714 requires pre-cert     Patient Name: Cameron Coppola  YOB: 1940     Date of [address] Name $$$ charge Daily Charge YTD   Total $   7/9/18 eval 81.88 81.88 81.88   7/11/18 Ex x 2 29.97 x2 59.94 141.82   7/13/18 Ex x3 29.97 x3 89.91 231.73   7/18/18 Ex x 3 29.97 x3 89.91 321.64   7/23/18 Ex x 3 29.97 x 3 89.91 411.55   7/25/18 Ex x 3 29.97 x3 89.91 501.46   7/27/18 Ex x 3 29.97 x3 89.91 591.37   7/30/18 Ex x 3 29.97 x3 89.91 681.28   8/1/18 Ex x 3 29.97 x3 89.91 771.19   8/6/18 Ex x 3 29.97 x 3 89.91 861.10   8/8/18 Ex x3 29.97 x3 89.91 951.01   8/10/18 Ex x 2 29.97 x2 59.94 1010.95   10/15/18 Eval, ex 81.88, 29.97 111.85 1122.80   10/17/18 Ex x 3 29.97 x3 89.91 1212.71   10/19/18 Ex x3 29.97 x3 89.91 1302.62   10/22/18 Ex x 2 29.97 x 2  59.94 1362.56   10/24/18 Ex x3 29.97 x3 89.91 1452.47   10/26/18 Ex x3 29.97 x3 89.91 1542.38   10/29/18 Ex x 3 29.97 x 3 89.91 1632.29   10/31/18 Ex x 3 29.97 x 3 89.91 1722.20   11/2/18 Ex x 3 29.97 x 3 89.91 1812.11   11/5/18 Ex x 3 29.97 x 3 89.91 1902.02   11/7/18 Ex x3 29.97 x3 89.91 1991.93.   11/9/18 Ex x3 29.97 x3 89.91 2081. 80

## 2019-05-08 ENCOUNTER — OFFICE VISIT (OUTPATIENT)
Dept: FAMILY MEDICINE CLINIC | Age: 79
End: 2019-05-08
Payer: MEDICARE

## 2019-05-08 VITALS — DIASTOLIC BLOOD PRESSURE: 70 MMHG | BODY MASS INDEX: 30.73 KG/M2 | SYSTOLIC BLOOD PRESSURE: 138 MMHG | WEIGHT: 168 LBS

## 2019-05-08 DIAGNOSIS — G25.81 RESTLESS LEG SYNDROME: Primary | ICD-10-CM

## 2019-05-08 PROCEDURE — G8400 PT W/DXA NO RESULTS DOC: HCPCS | Performed by: FAMILY MEDICINE

## 2019-05-08 PROCEDURE — 1090F PRES/ABSN URINE INCON ASSESS: CPT | Performed by: FAMILY MEDICINE

## 2019-05-08 PROCEDURE — 99213 OFFICE O/P EST LOW 20 MIN: CPT | Performed by: FAMILY MEDICINE

## 2019-05-08 PROCEDURE — 1123F ACP DISCUSS/DSCN MKR DOCD: CPT | Performed by: FAMILY MEDICINE

## 2019-05-08 PROCEDURE — G8427 DOCREV CUR MEDS BY ELIG CLIN: HCPCS | Performed by: FAMILY MEDICINE

## 2019-05-08 PROCEDURE — G8510 SCR DEP NEG, NO PLAN REQD: HCPCS | Performed by: FAMILY MEDICINE

## 2019-05-08 PROCEDURE — G8419 CALC BMI OUT NRM PARAM NOF/U: HCPCS | Performed by: FAMILY MEDICINE

## 2019-05-08 PROCEDURE — 1036F TOBACCO NON-USER: CPT | Performed by: FAMILY MEDICINE

## 2019-05-08 PROCEDURE — 4040F PNEUMOC VAC/ADMIN/RCVD: CPT | Performed by: FAMILY MEDICINE

## 2019-05-08 RX ORDER — PRAMIPEXOLE DIHYDROCHLORIDE 0.5 MG/1
0.5 TABLET ORAL NIGHTLY
Qty: 30 TABLET | Refills: 0 | Status: SHIPPED | OUTPATIENT
Start: 2019-05-08 | End: 2019-06-04 | Stop reason: SDUPTHER

## 2019-05-08 ASSESSMENT — PATIENT HEALTH QUESTIONNAIRE - PHQ9
SUM OF ALL RESPONSES TO PHQ QUESTIONS 1-9: 0
SUM OF ALL RESPONSES TO PHQ QUESTIONS 1-9: 0
1. LITTLE INTEREST OR PLEASURE IN DOING THINGS: 0
2. FEELING DOWN, DEPRESSED OR HOPELESS: 0
SUM OF ALL RESPONSES TO PHQ9 QUESTIONS 1 & 2: 0

## 2019-05-08 ASSESSMENT — ENCOUNTER SYMPTOMS
EYE DISCHARGE: 0
DIARRHEA: 0
EYE REDNESS: 0
SHORTNESS OF BREATH: 0
COUGH: 0
VOMITING: 0

## 2019-05-08 NOTE — PATIENT INSTRUCTIONS
SURVEY:    You may be receiving a survey from [email or web address] regarding your visit today. Please complete the survey to enable us to provide the highest quality of care to you and your family. If you cannot score us a very good on any question, please call the office to discuss how we could have made your experience a very good one. Thank you.

## 2019-05-08 NOTE — PROGRESS NOTES
HPI Notes    Name: Sade Rosas  : 1940        Chief Complaint:     Chief Complaint   Patient presents with    Other     RLS - Pt started Mirapex for c/o restless leg in March. Pt is taking every night as directed along with Tylenol. Pt states she lays in bed most of the night. History of Present Illness:     Sade Rosas is a 66 y.o.  female who presents with Other (RLS - Pt started Mirapex for c/o restless leg in March. Pt is taking every night as directed along with Tylenol. Pt states she lays in bed most of the night. )      HPI  Restless legs - pt states her legs still \"jump\". Rt leg is worse than the left. Pt states the mirapex 0.25mg is not helping. Pt has NO pain or restlessness during the day. Pt only restless and jerky legs at night so pt not sleeping well. No leg swelling. Past Medical History:     Past Medical History:   Diagnosis Date    Urinary incontinence     bladder dropped      Reviewed all health maintenance requirements and ordered appropriate tests  Health Maintenance Due   Topic Date Due    DEXA (modify frequency per FRAX score)  2005    Pneumococcal 65+ years Vaccine (1 of 2 - PCV13) 2005       Past Surgical History:     Past Surgical History:   Procedure Laterality Date    APPENDECTOMY      CARPAL TUNNEL RELEASE      HYSTERECTOMY      KNEE ARTHROSCOPY      Bilat        Medications:       Prior to Admission medications    Medication Sig Start Date End Date Taking? Authorizing Provider   pramipexole (MIRAPEX) 0.5 MG tablet Take 1 tablet by mouth nightly 19  Yes Alla Victoria MD   Acetaminophen (TYLENOL) 325 MG CAPS Take by mouth daily as needed    Historical Provider, MD        Allergies:       Macrobid [nitrofurantoin monohydrate macrocrystals]    Social History:     Tobacco:    reports that she has never smoked. She has never used smokeless tobacco.  Alcohol:      reports that she does not drink alcohol.   Drug Use:  reports that she does not use drugs. Family History:     Family History   Problem Relation Age of Onset    Heart Attack Mother     Heart Attack Brother 72        9/8/11    Other Sister         Brain aneurysm    Cancer Maternal Aunt         Breast       Review of Systems:       Review of Systems   Constitutional: Negative for chills and fever. Eyes: Negative for discharge and redness. Respiratory: Negative for cough and shortness of breath. Gastrointestinal: Negative for diarrhea and vomiting. Musculoskeletal: Negative for arthralgias and joint swelling. Restless legs at night   Skin: Negative for rash. Physical Exam:     Physical Exam   Constitutional: She appears well-developed and well-nourished. HENT:   Head: Normocephalic and atraumatic. Eyes: Right eye exhibits no discharge. Left eye exhibits no discharge. Neck: Neck supple. Cardiovascular: Normal rate and regular rhythm. Pulmonary/Chest: Effort normal and breath sounds normal.   Musculoskeletal: She exhibits no edema, tenderness or deformity. Bilateral LE +5/5 strength and no swelling   Vitals reviewed.       Vitals:  /70   Wt 168 lb (76.2 kg)   BMI 30.73 kg/m²       Data:     Lab Results   Component Value Date     08/06/2018    K 4.5 08/06/2018     08/06/2018    CO2 25 08/06/2018    BUN 16 08/06/2018    CREATININE 0.70 08/06/2018    GLUCOSE 102 08/06/2018    GLUCOSE 96 03/14/2012    PROT 6.3 08/06/2018    LABALBU 4.2 08/06/2018    LABALBU 4.2 03/14/2012    BILITOT 0.42 08/06/2018    ALKPHOS 67 08/06/2018    AST 16 08/06/2018    ALT 15 08/06/2018     Lab Results   Component Value Date    WBC 6.9 08/06/2018    RBC 4.73 08/06/2018    RBC 3.78 04/05/2012    HGB 13.9 08/06/2018    HCT 41.0 08/06/2018    MCV 86.7 08/06/2018    MCH 29.4 08/06/2018    MCHC 34.0 08/06/2018    RDW 14.3 08/06/2018     08/06/2018     04/05/2012    MPV NOT REPORTED 08/06/2018     Lab Results   Component Value Date    TSH 1.14 08/06/2018     Lab Results   Component Value Date    CHOL 210 08/06/2018    CHOL 205 05/17/2018    HDL 62 08/06/2018          Assessment/Plan:        1. Restless leg syndrome  Pt will increase the the mirapex to 0.5mg nightly and if not enough after 1wk then try taking TWO 0.5mg and call with in the month to get a new Rx of the 0.5mg or 1mg. If still not helping at 1mg then when pt calls will have to switch to another medication. Return if symptoms worsen or fail to improve.       Electronically signed by Pippa Thornton MD on 5/8/2019 at 8:37 AM

## 2019-06-04 RX ORDER — PRAMIPEXOLE DIHYDROCHLORIDE 0.5 MG/1
0.5 TABLET ORAL NIGHTLY
Qty: 30 TABLET | Refills: 5 | Status: SHIPPED | OUTPATIENT
Start: 2019-06-04 | End: 2019-10-25 | Stop reason: SDUPTHER

## 2019-06-04 NOTE — TELEPHONE ENCOUNTER
Patient is asking for a refill on Pramipexole 0.5 mg. Patient states med is working good for her. Drug [address] Maintenance   Topic Date Due    DEXA (modify frequency per FRAX score)  07/01/2005    Pneumococcal 65+ years Vaccine (1 of 2 - PCV13) 07/01/2005    DTaP/Tdap/Td vaccine (1 - Tdap) 06/04/2019 (Originally 7/1/1959)    Shingles Vaccine (1 of 2) 06/04/2019 (Originally 7/1/1990)    Flu vaccine (Season Ended) 09/01/2019             (applicable per patient's age: Cancer Screenings, Depression Screening, Fall Risk Screening, Immunizations)    LDL Cholesterol (mg/dL)   Date Value   08/06/2018 129     LDL Calculated (mg/dL)   Date Value   05/17/2018 123     AST (U/L)   Date Value   08/06/2018 16     ALT (U/L)   Date Value   08/06/2018 15     BUN (mg/dL)   Date Value   08/06/2018 16      (goal A1C is < 7)   (goal LDL is <100) need 30-50% reduction from baseline     BP Readings from Last 3 Encounters:   05/08/19 138/70   11/01/18 136/62   08/30/18 130/66    (goal /80)      All Future Testing planned in [facility]:  Lab Frequency Next Occurrence   EKG 12 lead unit performed Once 07/30/2018       Next Visit Date:  No future appointments.          Patient Active Problem List:     Arthritis

## 2019-10-25 ENCOUNTER — OFFICE VISIT (OUTPATIENT)
Dept: FAMILY MEDICINE CLINIC | Age: 79
End: 2019-10-25
Payer: MEDICARE

## 2019-10-25 VITALS
WEIGHT: 167 LBS | OXYGEN SATURATION: 96 % | BODY MASS INDEX: 31.53 KG/M2 | HEART RATE: 80 BPM | HEIGHT: 61 IN | SYSTOLIC BLOOD PRESSURE: 130 MMHG | DIASTOLIC BLOOD PRESSURE: 60 MMHG

## 2019-10-25 DIAGNOSIS — Z12.31 BREAST CANCER SCREENING BY MAMMOGRAM: ICD-10-CM

## 2019-10-25 DIAGNOSIS — Z00.00 MEDICARE ANNUAL WELLNESS VISIT, INITIAL: Primary | ICD-10-CM

## 2019-10-25 PROCEDURE — 4040F PNEUMOC VAC/ADMIN/RCVD: CPT | Performed by: FAMILY MEDICINE

## 2019-10-25 PROCEDURE — 1123F ACP DISCUSS/DSCN MKR DOCD: CPT | Performed by: FAMILY MEDICINE

## 2019-10-25 PROCEDURE — G0438 PPPS, INITIAL VISIT: HCPCS | Performed by: FAMILY MEDICINE

## 2019-10-25 PROCEDURE — G8484 FLU IMMUNIZE NO ADMIN: HCPCS | Performed by: FAMILY MEDICINE

## 2019-10-25 RX ORDER — PRAMIPEXOLE DIHYDROCHLORIDE 0.5 MG/1
0.5 TABLET ORAL NIGHTLY
Qty: 30 TABLET | Refills: 5 | Status: SHIPPED | OUTPATIENT
Start: 2019-10-25 | End: 2020-06-05 | Stop reason: SDUPTHER

## 2019-10-25 ASSESSMENT — PATIENT HEALTH QUESTIONNAIRE - PHQ9
SUM OF ALL RESPONSES TO PHQ QUESTIONS 1-9: 0
SUM OF ALL RESPONSES TO PHQ QUESTIONS 1-9: 0

## 2019-10-25 ASSESSMENT — LIFESTYLE VARIABLES: HOW OFTEN DO YOU HAVE A DRINK CONTAINING ALCOHOL: 0

## 2019-11-21 ENCOUNTER — HOSPITAL ENCOUNTER (OUTPATIENT)
Dept: MAMMOGRAPHY | Age: 79
Discharge: HOME | End: 2019-11-23
Payer: MEDICARE

## 2019-11-21 DIAGNOSIS — Z12.31 BREAST CANCER SCREENING BY MAMMOGRAM: ICD-10-CM

## 2019-11-21 PROCEDURE — 77067 SCR MAMMO BI INCL CAD: CPT

## 2020-01-06 ENCOUNTER — HOSPITAL ENCOUNTER (OUTPATIENT)
Dept: PHYSICAL THERAPY | Age: 80
Setting detail: THERAPIES SERIES
Discharge: HOME | End: 2020-01-06
Payer: MEDICARE

## 2020-01-06 PROCEDURE — 97161 PT EVAL LOW COMPLEX 20 MIN: CPT

## 2020-01-06 ASSESSMENT — PAIN SCALES - GENERAL: PAINLEVEL_OUTOF10: 4

## 2020-01-06 NOTE — PRE-CERTIFICATION NOTE
Medicare Cap     [] Physical Therapy  [] Speech Therapy  [] Occupational therapy    *PT and Speech caps combine      $1940 Cap limit < kx modifier needed < $9664 requires pre-cert     Patient Name: Shira Gayle  YOB: 1940     Date of M[address] Name $$$ charge Daily Charge YTD   Total $   1/6/20 EVAL 83.82 83.82 83.82

## 2020-01-06 NOTE — PROGRESS NOTES
Phone: [phone]         Fax: [phone]                      Outpatient Physical Therapy                                                                      Evaluation    Date: 2020  Patient: Roman Dear  : 1940  ACCT #: [de-identified]    Referring Practitioner: Dr. Rosita Villalobos    Referral Date : 19    Diagnosis: Right knee pain    Treatment Diagnosis: Right knee pain  Onset Date: 19  PT Insurance Information: MCR    Per Physician Order  Total # of Visits to Date: 1  No Show: 0  Canceled Appointment: 0     Subjective     Additional Pertinent Hx: Patient with chronic right knee pain which is progressing in pain and functional deficits regarding limited standing and walking tolerances as well as instability. Patient recieved cortisone injection in Sept which assisted with pain reduction until Dec 2019. She is now using cane for ambulation for stability. Her pain increases based on activity level. No formal F/U with Dr. Rosita Villalobos however patient states she will contact him after 2 weeks of therapy to schedule surgery date. LEFS = 54/80.    PMHx includes left TKR 2018  Pain Screening  Patient Currently in Pain: Yes  Pain Assessment  Pain Assessment: 0-10  Pain Level: 4(increases to 6-7/10 with activity/standing)  Social/Functional History  Lives With: Spouse  IADL History  Active : Yes  Occupation: Retired  Leisure & Hobbies: Very active with daily household tasks; also coordinates [employer]    Objective  Vision  Vision: Within Functional Limits  Hearing  Hearing: Within functional limits  Observation/Palpation  Posture: Good  Palpation: Mild tenderness medial right knee     Strength RLE  Strength RLE: Exception  R Hip Flexion: 4/5  R Hip ABduction: 3+/5  R Knee Extension: 4/5  R Ankle Dorsiflexion: 4/5  AROM RLE (degrees)  RLE AROM: Exceptions  R Knee Flexion 0-145: 110 deg seatedflexion  R Knee Extension 0: 5 deg Strength LLE  Strength LLE: WFL  AROM LLE (degrees)  LLE AROM : WFL       AROM RLE (degrees)  RLE AROM: Exceptions  R Knee Flexion 0-145: 110 deg seatedflexion  R Knee Extension 0: 5 deg   AROM LLE (degrees)  LLE AROM : WFL        PROM RLE (degrees)  RLE General PROM: Passive extension 0-5 deg                 WB Status: Ambulating with cane for stability primarily in community/unlevel surfaces. Assessment  Assessment: Patient presents with chronic right knee pain which is affecting her ability and tolerance to complete dailly household tasks and fucntional ambulation. She would benefit from short term PT to education on home exercises and to strengthening and promote full ROM prior to right TKR.      Prognosis: Good  Decision Making: Low Complexity  Exam: LEFS = 54/80    Clinical Presentation:  Stable/Uncomplicated  The Following Comorbities will impact the patients progression and Plan of Care:   Previous Orthopedic Injury/Surgery       Activity Tolerance: Patient Tolerated treatment well    Education: PT POC and goals;  HEP          Goals  Short term goals  Time Frame for Short term goals: 5 visits  Short term goal 1: Educate on home program of right knee/LE stretching and strengthening    Long term goals  Time Frame for Long term goals : 10 visits - expires 2/24/20  Long term goal 1: Right knee extension strength 4+/5 to improve stability with functional ambulation   Long term goal 2: Upgrade home program for strengthening as appropriate of hip and knee    Patient's Goal:    Get stronger pre op     Timed Code Treatment Minutes: 0 Minutes  Total Treatment Time: 45     Time In: 1000  Time Out: 5080    HARLEY SERRATO, PT Date: 1/6/2020

## 2020-01-10 ENCOUNTER — HOSPITAL ENCOUNTER (OUTPATIENT)
Dept: PHYSICAL THERAPY | Age: 80
Setting detail: THERAPIES SERIES
Discharge: HOME | End: 2020-01-10
Payer: MEDICARE

## 2020-01-10 PROCEDURE — 97110 THERAPEUTIC EXERCISES: CPT

## 2020-01-10 ASSESSMENT — PAIN SCALES - GENERAL: PAINLEVEL_OUTOF10: 3

## 2020-01-10 NOTE — PRE-CERTIFICATION NOTE
Medicare Cap     [] Physical Therapy  [] Speech Therapy  [] Occupational therapy    *PT and Speech caps combine      $0271 Cap limit < kx modifier needed < $5913 requires pre-cert     Patient Name: Afsaneh Lopez  YOB: 1940     Date of [facility] Name $$$ charge Daily Charge YTD   Total $   1/6/20 EVAL 83.82 83.82 83.82   1/10/20 Ex x 2

## 2020-01-10 NOTE — PROGRESS NOTES
Phone: [address]      Fax: [phone]                            Outpatient Physical Therapy                                                                            Daily Note    Date: 1/10/2020  Patient Name: Ewa Ross        MRN: [id number]   ACCT#:  [de-identified]  : 1940  (78 y.o.)    Referring Practitioner: Dr. Anupam Alanis    Referral Date : 19    Diagnosis: Right knee pain       Onset Date: 19  PT Insurance Information: [facility]    Per Physician Order  Total # of Visits to Date: 2  No Show: 0  Canceled Appointment: 0  Plan of Care/Certification Expiration Date: 20    Pre-Treatment Pain:  3/10     Assessment  Assessment: Patient subjectively notes improved strength and decreased pain however still has discomfort with activity. Plan to continue with general strengthening of right LE and educate on HEP to prepare for probable TKR.   Patient will contact ortho office next week regarding follow-up appt / surgery  Chart Reviewed: Yes    Plan  Plan: Continue with current plan    Exercises/Modalities/Manual:  See DocFlow Sheet    Education:           Goals  (Total # of Visits to Date: 2)   Short Term Goals - Time Frame for Short term goals: 5 visits  Short term goal 1: Educate on home program of right knee/LE stretching and strengthening                Long Term Goals - Time Frame for Long term goals : 10 visits - expires 20  Long term goal 1: Right knee extension strength 4+/5 to improve stability with functional ambulation   Long term goal 2: Upgrade home program for strengthening as appropriate of hip and knee             Post Treatment Pain:  3/10    Time In: 0935    Time Out : 1010        Timed Code Treatment Minutes: 30 Minutes  Total Treatment Time: [facility] , PT     Date: 1/10/2020

## 2020-01-13 ENCOUNTER — HOSPITAL ENCOUNTER (OUTPATIENT)
Dept: PHYSICAL THERAPY | Age: 80
Setting detail: THERAPIES SERIES
Discharge: HOME | End: 2020-01-13
Payer: MEDICARE

## 2020-01-13 PROCEDURE — 97110 THERAPEUTIC EXERCISES: CPT

## 2020-01-13 ASSESSMENT — PAIN SCALES - GENERAL: PAINLEVEL_OUTOF10: 3

## 2020-01-17 ENCOUNTER — HOSPITAL ENCOUNTER (OUTPATIENT)
Dept: PHYSICAL THERAPY | Age: 80
Setting detail: THERAPIES SERIES
Discharge: HOME | End: 2020-01-17
Payer: MEDICARE

## 2020-01-17 PROCEDURE — 97110 THERAPEUTIC EXERCISES: CPT

## 2020-01-17 ASSESSMENT — PAIN SCALES - GENERAL: PAINLEVEL_OUTOF10: 3

## 2020-01-17 NOTE — PROGRESS NOTES
Phone: [address]      Fax: [phone]                            Outpatient Physical Therapy                                                                            Daily Note    Date: 2020  Patient Name: Rubio Oh        MRN: [id number]   ACCT#:  [de-identified]  : 1940  (78 y.o.)    Referring Practitioner: Dr. Kiana Ibarra    Referral Date : 19    Diagnosis: Right knee pain       Onset Date: 19  PT Insurance Information: [facility]  Total # of Visits Approved: 10 Per Physician Order  Total # of Visits to Date: 4  No Show: 0  Canceled Appointment: 0  Plan of Care/Certification Expiration Date: 20    Pre-Treatment Pain:  310  Subjective: See's  20  Assessment  Assessment: Pt reports mild soreness after therapy sessions. She is compliant with HEP. Supine knee ext to 2-3 deg from neutral.  HS 90/90 18 deg FN. Will cont per plan, pt scheduled Dr montana for .   Chart Reviewed: Yes    Plan  Plan: Continue with current plan    Exercises/Modalities/Manual:  See DocFlow Sheet       Goals  (Total # of Visits to Date: 4)   Short Term Goals - Time Frame for Short term goals: 5 visits  Short term goal 1: Educate on home program of right knee/LE stretching and strengthening-met                Long Term Goals - Time Frame for Long term goals : 10 visits - expires 20  Long term goal 1: Right knee extension strength 4+/5 to improve stability with functional ambulation   Long term goal 2: Upgrade home program for strengthening as appropriate of hip and knee             Post Treatment Pain:  3/10    Time In: 1022  Time Out : 1050        Timed Code Treatment Minutes: 28 Minutes  Total Treatment Time: 28 Minutes    Ratna Pike  PTA   Date: 2020

## 2020-01-17 NOTE — PRE-CERTIFICATION NOTE
Medicare Cap     [] Physical Therapy  [] Speech Therapy  [] Occupational therapy    *PT and Speech caps combine      $1940 Cap limit < kx modifier needed < $2374 requires pre-cert     Patient Name: Shira Gayle  YOB: 1940     Date of [facility] [address] Name $$$ charge Daily Charge YTD   Total $   1/6/20 EVAL 83.82 83.82 83.82   1/10/20 Ex x 2 30.08 x 2 60.16 143.98   1/13/20 Ex x2 30.08 x 2 60.16 204.14   1/17/20 Ex x 2 30.08 x 2 60.16 264.30

## 2020-01-17 NOTE — PRE-CERTIFICATION NOTE
Medicare Cap     [] Physical Therapy  [] Speech Therapy  [] Occupational therapy    *PT and Speech caps combine      $1940 Cap limit < kx modifier needed < $4386 requires pre-cert     Patient Name: Chidi Rivera  YOB: 1940     Date of [facility] [address] Name $$$ charge Daily Charge YTD   Total $   1/6/20 EVAL 83.82 83.82 83.82   1/10/20 Ex x 2 30.08 x 2 60.16 143.98   1/13/20 Ex x2      1/17/20 Ex x 2

## 2020-01-20 ENCOUNTER — HOSPITAL ENCOUNTER (OUTPATIENT)
Dept: PHYSICAL THERAPY | Age: 80
Setting detail: THERAPIES SERIES
Discharge: HOME | End: 2020-01-20
Payer: MEDICARE

## 2020-01-20 PROCEDURE — 97110 THERAPEUTIC EXERCISES: CPT

## 2020-01-20 ASSESSMENT — PAIN SCALES - GENERAL: PAINLEVEL_OUTOF10: 4

## 2020-01-20 NOTE — PROGRESS NOTES
Phone: [address]      Fax: [phone]                            Outpatient Physical Therapy                                                                            Daily Note    Date: 2020  Patient Name: Narda Urena        MRN: [id number]   ACCT#:  [de-identified]  : 1940  (78 y.o.)    Referring Practitioner: Dr. Bette Piña    Referral Date : 19    Diagnosis: Right knee pain       Onset Date: 19  PT Insurance Information: [facility]  Total # of Visits Approved: 10 Per Physician Order  Total # of Visits to Date: 5  No Show: 0  Canceled Appointment: 0  Plan of Care/Certification Expiration Date: 20    Pre-Treatment Pain:  3/10  Subjective: See's Dr 20  Assessment  Assessment: Patient arrived stating pain elevated this date and has c/o no energy. Patient reports not completing HEP this date. Completed HEP in clinic this date.  AAROM knee flexion with heel slide = 111 degrees AROM knee extension = 4 degrees from neutral.  Chart Reviewed: Yes    Plan  Plan: Continue with current plan    Exercises/Modalities/Manual:  See DocFlow Sheet    Education:           Goals  (Total # of Visits to Date: 5)   Short Term Goals - Time Frame for Short term goals: 5 visits  Short term goal 1: Educate on home program of right knee/LE stretching and strengthening-met                Long Term Goals - Time Frame for Long term goals : 10 visits - expires 20  Long term goal 1: Right knee extension strength 4+/5 to improve stability with functional ambulation   Long term goal 2: Upgrade home program for strengthening as appropriate of hip and knee             Post Treatment Pain:  4/10      Time In: 0950  Time Out: 1017  Timed Code Treatment Minutes: 27 Minutes  Total Treatment Time: 32 Minutes    Matt Latif, LINA     Date: 2020

## 2020-01-20 NOTE — PRE-CERTIFICATION NOTE
Medicare Cap     [] Physical Therapy  [] Speech Therapy  [] Occupational therapy    *PT and Speech caps combine      $1940 Cap limit < kx modifier needed < $4816 requires pre-cert     Patient Name: Stefan Vasquez  YOB: 1940     Date of [facility] [address] Name $$$ charge Daily Charge YTD   Total $   1/6/20 EVAL 83.82 83.82 83.82   1/10/20 Ex x 2 30.08 x 2 60.16 143.98   1/13/20 Ex x2 30.08 x 2 60.16 204.14   1/17/20 Ex x 2 30.08 x 2 60.16 264.30   39655 Ex x2

## 2020-01-24 ENCOUNTER — HOSPITAL ENCOUNTER (OUTPATIENT)
Dept: PHYSICAL THERAPY | Age: 80
Setting detail: THERAPIES SERIES
Discharge: HOME | End: 2020-01-24
Payer: MEDICARE

## 2020-01-24 PROCEDURE — 97110 THERAPEUTIC EXERCISES: CPT

## 2020-01-24 ASSESSMENT — PAIN SCALES - GENERAL: PAINLEVEL_OUTOF10: 4

## 2020-01-24 NOTE — PROGRESS NOTES
Phone: [address]      Fax: [phone]                            Outpatient Physical Therapy                                                                            Daily Note    Date: 2020  Patient Name: Pilar Arreola        MRN: [id number]   ACCT#:  [de-identified]  : 1940  (78 y.o.)    Referring Practitioner: Dr. Samia Donis    Referral Date : 19    Diagnosis: Right knee pain       Onset Date: 19  PT Insurance Information: [facility]  Total # of Visits Approved: 10 Per Physician Order  Total # of Visits to Date: 6  No Show: 0  Canceled Appointment: 0  Plan of Care/Certification Expiration Date: 20    Pre-Treatment Pain:  3-4/10  Subjective: See's Dr 20  Assessment  Assessment: Patient continues to report medial right knee pain and ambulates with mild deviation. Completed strengthening ex for right hip and knee extensors as well as stretching for terminal knee extension to address gait deficits. PROM 105- 110 deg seated flexion. Plan to continue x 2 visits to progress with strengthening and upgrade home program.  Patient has appt with ortho 20 to determine surgery.   Chart Reviewed: Yes    Plan  Plan: Continue with current plan    Exercises/Modalities/Manual:  See DocFlow Sheet    Education: HS stretch on step           Goals  (Total # of Visits to Date: 6)   Short Term Goals - Time Frame for Short term goals: 5 visits  Short term goal 1: Educate on home program of right knee/LE stretching and strengthening-met                Long Term Goals - Time Frame for Long term goals : 10 visits - expires 20  Long term goal 1: Right knee extension strength 4+/5 to improve stability with functional ambulation   Long term goal 2: Upgrade home program for strengthening as appropriate of hip and knee             Post Treatment Pain:  3-4/10    Time In: 0935    Time Out : 1010        Timed Code Treatment Minutes: 30 Minutes  Total Treatment Time: 35

## 2020-01-24 NOTE — PRE-CERTIFICATION NOTE
Medicare Cap     [] Physical Therapy  [] Speech Therapy  [] Occupational therapy    *PT and Speech caps combine      $1940 Cap limit < kx modifier needed < $4706 requires pre-cert     Patient Name: Marino Richter  YOB: 1940     Date of [facility] [address] Name $$$ charge Daily Charge YTD   Total $   1/6/20 EVAL 83.82 83.82 83.82   1/10/20 Ex x 2 30.08 x 2 60.16 143.98   1/13/20 Ex x2 30.08 x 2 60.16 204.14   1/17/20 Ex x 2 30.08 x 2 60.16 264.30   76850 Ex x2 30.08 x 2 60.16 324.46   1/24/20 Ex x 2 30.08 x 2 60.16 384.62

## 2020-01-27 ENCOUNTER — HOSPITAL ENCOUNTER (OUTPATIENT)
Dept: PHYSICAL THERAPY | Age: 80
Setting detail: THERAPIES SERIES
Discharge: HOME | End: 2020-01-27
Payer: MEDICARE

## 2020-01-27 PROCEDURE — 97110 THERAPEUTIC EXERCISES: CPT

## 2020-01-27 ASSESSMENT — PAIN SCALES - GENERAL: PAINLEVEL_OUTOF10: 3

## 2020-01-27 NOTE — PRE-CERTIFICATION NOTE
Medicare Cap     [] Physical Therapy  [] Speech Therapy  [] Occupational therapy    *PT and Speech caps combine      $1940 Cap limit < kx modifier needed < $1566 requires pre-cert     Patient Name: Charlie Clements  YOB: 1940     Date of [facility] [address] Name $$$ charge Daily Charge YTD   Total $   1/6/20 EVAL 83.82 83.82 83.82   1/10/20 Ex x 2 30.08 x 2 60.16 143.98   1/13/20 Ex x2 30.08 x 2 60.16 204.14   1/17/20 Ex x 2 30.08 x 2 60.16 264.30   24192 Ex x2 30.08 x 2 60.16 324.46   1/24/20 Ex x 2 30.08 x 2 60.16 384.62   1/27/20 Ex x 2 30.08 x 2 60.16 444.78

## 2020-01-27 NOTE — PROGRESS NOTES
Phone: [address]      Fax: [phone]                            Outpatient Physical Therapy                                                                            Daily Note    Date: 2020  Patient Name: Shila Landers        MRN: [id number]   ACCT#:  [de-identified]  : 1940  (78 y.o.)    Referring Practitioner: Dr. Lamont Lindsey    Referral Date : 19    Diagnosis: Right knee pain       Onset Date: 19  PT Insurance Information: [facility]  Total # of Visits Approved: 10 Per Physician Order  Total # of Visits to Date: 7  No Show: 0  Canceled Appointment: 0  Plan of Care/Certification Expiration Date: 20    Pre-Treatment Pain:  3/10  Subjective: See's  20  Assessment  Assessment: Performed ex as outlined today. Added SLS and SLS with ball roll to address balance deficits. Pt see's Dr Giacomo Melgoza where she hopes to scheduled surgery. 1 more therapy visit. scheduled.    Chart Reviewed: Yes    Plan  Plan: Continue with current plan    Exercises/Modalities/Manual:  See DocFlow Sheet      Goals  (Total # of Visits to Date: 7)   Short Term Goals - Time Frame for Short term goals: 5 visits  Short term goal 1: Educate on home program of right knee/LE stretching and strengthening-met                Long Term Goals - Time Frame for Long term goals : 10 visits - expires 20  Long term goal 1: Right knee extension strength 4+/5 to improve stability with functional ambulation   Long term goal 2: Upgrade home program for strengthening as appropriate of hip and knee             Post Treatment Pain:  3/10    Time In: 0900    Time Out : 0935        Timed Code Treatment Minutes: 35 Minutes  Total Treatment Time: 35 Minutes    Vladimir Pike PTA  Date: 2020

## 2020-01-31 ENCOUNTER — HOSPITAL ENCOUNTER (OUTPATIENT)
Dept: PHYSICAL THERAPY | Age: 80
Setting detail: THERAPIES SERIES
Discharge: HOME | End: 2020-01-31
Payer: MEDICARE

## 2020-01-31 PROCEDURE — 97110 THERAPEUTIC EXERCISES: CPT

## 2020-01-31 ASSESSMENT — PAIN SCALES - GENERAL: PAINLEVEL_OUTOF10: 3

## 2020-01-31 NOTE — PRE-CERTIFICATION NOTE
Medicare Cap     [] Physical Therapy  [] Speech Therapy  [] Occupational therapy    *PT and Speech caps combine      $1940 Cap limit < kx modifier needed < $8106 requires pre-cert     Patient Name: Stormy Street  YOB: 1940     Date of [facility] [address] Name $$$ charge Daily Charge YTD   Total $   1/6/20 EVAL 83.82 83.82 83.82   1/10/20 Ex x 2 30.08 x 2 60.16 143.98   1/13/20 Ex x2 30.08 x 2 60.16 204.14   1/17/20 Ex x 2 30.08 x 2 60.16 264.30   26584 Ex x2 30.08 x 2 60.16 324.46   1/24/20 Ex x 2 30.08 x 2 60.16 384.62   1/27/20 Ex x 2 30.08 x 2 60.16 444.78   1/31/20 Ex x 2 30.08 x 2 60.16 504.94

## 2020-01-31 NOTE — DISCHARGE SUMMARY
Phone: [address]             Fax: [phone]                            Outpatient Physical Therapy                                                                    Discharge Summary/ Progress Note    Patient: Alessio Matthew  : 1940  ACCT #: [de-identified]   Referring Practitioner: Dr. Adilson Powers      Diagnosis: Right knee pain    Date Treatment Initiated: 20  Date of Last Treatment: 20    PT Visit Information  Onset Date: 19  PT Insurance Information: [facility]  Total # of Visits Approved: 10  Total # of Visits to Date: 8  Plan of Care/Certification Expiration Date: 20  No Show: 0  Canceled Appointment: 0    Treatment Received:  Patient Education/HEP and Therapeutic Exercise    Assessment: Patient has completed 8 treatment sessions consisting of  strengthening and ROM for her right knee due to pain/ decreased mobility associated with degenerative arthritis. PROM right knee 0- 120 deg flexion. Strength generally 4+/5. Patient has been educated on a home program.   She is to consult with orthopedic surgeon on 20 for probably TKR. Based on her status, plan to discharge with an independent program to continue pre-operatively. Reason for Discharge:  Goals Met and Optimal Function Achieved    Comments:   Thank you for this referral      PAN SCHWARTZ  Date: 2020

## 2020-02-07 ENCOUNTER — TELEPHONE (OUTPATIENT)
Dept: CARDIOLOGY CLINIC | Age: 80
End: 2020-02-07

## 2020-02-07 NOTE — TELEPHONE ENCOUNTER
Patient called for appt for cardiac clearance for surgery with Dr Samia Donis for Left Total Knee Surgery.  All testing ordered and sent to patient

## 2020-02-27 ENCOUNTER — HOSPITAL ENCOUNTER (OUTPATIENT)
Age: 80
Discharge: HOME | End: 2020-02-27
Payer: MEDICARE

## 2020-02-27 ENCOUNTER — HOSPITAL ENCOUNTER (OUTPATIENT)
Dept: GENERAL RADIOLOGY | Age: 80
Discharge: HOME | End: 2020-02-29
Payer: MEDICARE

## 2020-02-27 ENCOUNTER — HOSPITAL ENCOUNTER (OUTPATIENT)
Age: 80
Discharge: HOME | End: 2020-02-29
Payer: MEDICARE

## 2020-02-27 LAB
ABSOLUTE EOS #: 0.2 K/UL (ref 0–0.4)
ABSOLUTE IMMATURE GRANULOCYTE: NORMAL K/UL (ref 0–0.3)
ABSOLUTE LYMPH #: 1.1 K/UL (ref 1–4.8)
ABSOLUTE MONO #: 0.5 K/UL (ref 0–1)
ALBUMIN SERPL-MCNC: 4.8 G/DL (ref 3.5–5.2)
ALBUMIN/GLOBULIN RATIO: ABNORMAL (ref 1–2.5)
ALP BLD-CCNC: 70 U/L (ref 35–104)
ALT SERPL-CCNC: 12 U/L (ref 5–33)
ANION GAP SERPL CALCULATED.3IONS-SCNC: 13 MMOL/L (ref 9–17)
AST SERPL-CCNC: 17 U/L
BASOPHILS # BLD: 1 % (ref 0–2)
BASOPHILS ABSOLUTE: 0.1 K/UL (ref 0–0.2)
BILIRUB SERPL-MCNC: 0.32 MG/DL (ref 0.3–1.2)
BUN BLDV-MCNC: 13 MG/DL (ref 8–23)
BUN/CREAT BLD: 21 (ref 9–20)
CALCIUM SERPL-MCNC: 9.9 MG/DL (ref 8.6–10.4)
CHLORIDE BLD-SCNC: 105 MMOL/L (ref 98–107)
CHOLESTEROL/HDL RATIO: 2.9
CHOLESTEROL: 188 MG/DL
CO2: 24 MMOL/L (ref 20–31)
CREAT SERPL-MCNC: 0.62 MG/DL (ref 0.5–0.9)
DIFFERENTIAL TYPE: YES
EKG ATRIAL RATE: 67 BPM
EKG P AXIS: 37 DEGREES
EKG P-R INTERVAL: 136 MS
EKG Q-T INTERVAL: 388 MS
EKG QRS DURATION: 86 MS
EKG QTC CALCULATION (BAZETT): 409 MS
EKG R AXIS: 29 DEGREES
EKG T AXIS: 51 DEGREES
EKG VENTRICULAR RATE: 67 BPM
EOSINOPHILS RELATIVE PERCENT: 3 % (ref 0–5)
GFR AFRICAN AMERICAN: >60 ML/MIN
GFR NON-AFRICAN AMERICAN: >60 ML/MIN
GFR SERPL CREATININE-BSD FRML MDRD: ABNORMAL ML/MIN/{1.73_M2}
GFR SERPL CREATININE-BSD FRML MDRD: ABNORMAL ML/MIN/{1.73_M2}
GLUCOSE BLD-MCNC: 107 MG/DL (ref 70–99)
HCT VFR BLD CALC: 40.8 % (ref 36–46)
HDLC SERPL-MCNC: 64 MG/DL
HEMOGLOBIN: 13.6 G/DL (ref 12–16)
IMMATURE GRANULOCYTES: NORMAL %
LDL CHOLESTEROL: 89 MG/DL (ref 0–130)
LYMPHOCYTES # BLD: 19 % (ref 15–40)
MAGNESIUM: 2.4 MG/DL (ref 1.6–2.6)
MCH RBC QN AUTO: 28.8 PG (ref 26–34)
MCHC RBC AUTO-ENTMCNC: 33.5 G/DL (ref 31–37)
MCV RBC AUTO: 86.1 FL (ref 80–100)
MONOCYTES # BLD: 8 % (ref 4–8)
NRBC AUTOMATED: NORMAL PER 100 WBC
PATIENT FASTING?: YES
PDW BLD-RTO: 13.8 % (ref 12.1–15.2)
PLATELET # BLD: 256 K/UL (ref 140–450)
PLATELET ESTIMATE: NORMAL
PMV BLD AUTO: NORMAL FL (ref 6–12)
POTASSIUM SERPL-SCNC: 4.3 MMOL/L (ref 3.7–5.3)
RBC # BLD: 4.73 M/UL (ref 4–5.2)
RBC # BLD: NORMAL 10*6/UL
SEG NEUTROPHILS: 69 % (ref 47–75)
SEGMENTED NEUTROPHILS ABSOLUTE COUNT: 4.1 K/UL (ref 2.5–7)
SODIUM BLD-SCNC: 142 MMOL/L (ref 135–144)
TOTAL PROTEIN: 7.1 G/DL (ref 6.4–8.3)
TRIGL SERPL-MCNC: 177 MG/DL
TSH SERPL DL<=0.05 MIU/L-ACNC: 1.77 MIU/L (ref 0.3–5)
VITAMIN D 25-HYDROXY: 29.8 NG/ML (ref 30–100)
VLDLC SERPL CALC-MCNC: ABNORMAL MG/DL (ref 1–30)
WBC # BLD: 5.9 K/UL (ref 3.5–11)
WBC # BLD: NORMAL 10*3/UL

## 2020-02-27 PROCEDURE — 80061 LIPID PANEL: CPT

## 2020-02-27 PROCEDURE — 82306 VITAMIN D 25 HYDROXY: CPT

## 2020-02-27 PROCEDURE — 71046 X-RAY EXAM CHEST 2 VIEWS: CPT

## 2020-02-27 PROCEDURE — 36415 COLL VENOUS BLD VENIPUNCTURE: CPT

## 2020-02-27 PROCEDURE — 93010 ELECTROCARDIOGRAM REPORT: CPT | Performed by: INTERNAL MEDICINE

## 2020-02-27 PROCEDURE — 84443 ASSAY THYROID STIM HORMONE: CPT

## 2020-02-27 PROCEDURE — 85025 COMPLETE CBC W/AUTO DIFF WBC: CPT

## 2020-02-27 PROCEDURE — 93005 ELECTROCARDIOGRAM TRACING: CPT

## 2020-02-27 PROCEDURE — 80053 COMPREHEN METABOLIC PANEL: CPT

## 2020-02-27 PROCEDURE — 83735 ASSAY OF MAGNESIUM: CPT

## 2020-02-28 ENCOUNTER — OFFICE VISIT (OUTPATIENT)
Dept: CARDIOLOGY CLINIC | Age: 80
End: 2020-02-28
Payer: MEDICARE

## 2020-02-28 VITALS
HEART RATE: 92 BPM | BODY MASS INDEX: 31.16 KG/M2 | SYSTOLIC BLOOD PRESSURE: 148 MMHG | WEIGHT: 166 LBS | DIASTOLIC BLOOD PRESSURE: 60 MMHG | OXYGEN SATURATION: 97 %

## 2020-02-28 PROCEDURE — G8417 CALC BMI ABV UP PARAM F/U: HCPCS | Performed by: INTERNAL MEDICINE

## 2020-02-28 PROCEDURE — 4040F PNEUMOC VAC/ADMIN/RCVD: CPT | Performed by: INTERNAL MEDICINE

## 2020-02-28 PROCEDURE — 99214 OFFICE O/P EST MOD 30 MIN: CPT | Performed by: INTERNAL MEDICINE

## 2020-02-28 PROCEDURE — 1123F ACP DISCUSS/DSCN MKR DOCD: CPT | Performed by: INTERNAL MEDICINE

## 2020-02-28 PROCEDURE — G8400 PT W/DXA NO RESULTS DOC: HCPCS | Performed by: INTERNAL MEDICINE

## 2020-02-28 PROCEDURE — 1036F TOBACCO NON-USER: CPT | Performed by: INTERNAL MEDICINE

## 2020-02-28 PROCEDURE — G8484 FLU IMMUNIZE NO ADMIN: HCPCS | Performed by: INTERNAL MEDICINE

## 2020-02-28 PROCEDURE — G8428 CUR MEDS NOT DOCUMENT: HCPCS | Performed by: INTERNAL MEDICINE

## 2020-02-28 PROCEDURE — 1090F PRES/ABSN URINE INCON ASSESS: CPT | Performed by: INTERNAL MEDICINE

## 2020-02-28 NOTE — PROGRESS NOTES
Osvaldo Dan and has a garden. 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 REVIEW OF SYSTEMS:  Cardiac as above. Other systems reviewed including constitutional, eyes, ears, nose and throat, cardiovascular, respiratory, GI, , musculoskeletal, integumentary, neurologic,  endocrine, hematologic and allergic/immunologic, are negative except for what is described above. No weight loss or weight gain. No change in bowel habits, no blood in stools. No fevers, sweats or chills. PHYSICAL EXAMINATION:   VITAL SIGNS:  Her blood pressure was 140/60 with a heart rate of 92 and regular. Respiratory rate 18. O2 saturation 97%. Weight 166 pounds. GENERAL:  She is a pleasant 27-year-old female. Denied pain. She was oriented to person, place and time. Answered questions appropriately. SKIN:  She does have a very red, inflamed, what appears to be a yeast infection under both breasts. HEENT:  The pupils are equally round and intact. Mucous membranes were dry. NECK:  No JVD. Good carotid pulses. No carotid bruits. No lymphadenopathy or thyromegaly. CARDIOVASCULAR EXAM:  S1 and S2 were normal.  No S3 or S4. Soft systolic blowing type murmur. No diastolic murmur. PMI was normal.  No lift, thrust, or pericardial friction rub. LUNGS:  Quite clear to auscultation and percussion. ABDOMEN:  Soft and nontender. Good bowel sounds. EXTREMITIES:  Good femoral pulses. Good pedal pulses. No pedal edema. Skin was warm and dry. No calf tenderness. Nail beds pink. Good cap refill. PULSES:  Good brachial, carotid, femoral, and pedal pulses. NEUROLOGIC EXAM:  Unremarkable. PSYCHIATRIC EXAM:  Unremarkable. LABORATORY DATA:  From 02/27/2020, sodium 142, potassium 4.3, BUN 13, creatinine 0.62, GFR greater than 60, magnesium 2.4, glucose 107, calcium was 9.9. Cholesterol 188 with an HDL of 64, LDL 89, triglycerides 177. ALT was 12, AST was 17. TSH 1.77. Vitamin D was 29.8.   White count 5.9, hemoglobin 13.6 with a

## 2020-02-28 NOTE — PATIENT INSTRUCTIONS
Cleared for surgery     Will follow up as needed      Will use Lotrisone cream  And  Cortisone cream- over the counter

## 2020-02-28 NOTE — LETTER
Matt Sharp M.D. [address] [place] [address] [facility]  [phone]          2020          [address] Campbell MD   [facility], [address] [place] [address]        RE:   Ludwin Hutton  :  1940      Dear Dr. Jes Campbell:    CHIEF COMPLAINT:  Cardiac clearance for right knee replacement on 2020. HISTORY OF PRESENT ILLNESS:  I had the pleasure of seeing Mrs. Hutton in our office on 2020. She is a pleasant 70-year-old female who has a family history of coronary artery disease with her brother having an MI with angioplasty. I saw her on 2018 for preoperative clearance for prolapsed uterus surgery. She has never had a myocardial infarction, never had chest pain or chest discomfort. She has had no PND, orthopnea or pedal edema. She remains active although is limited because of arthritis in her right knee. She continues to work at [employer] here in [facility]. She has had no change in her activity level or her energy, and she has no unusual shortness of breath. She has had no hospitalizations or procedures. CARDIAC RISK FACTORS:  Prior MI:  Negative. Other Family Members:  Positive. Peripheral Vascular Disease:  Negative. Smoking:  Negative. Diabetes:  Negative. Hypertension:  Negative. MEDICATIONS AT HOME:  She is currently on Mirapex 0.5 mg nightly, Tylenol p.r.n. PAST MEDICAL HISTORY:  1. Appendectomy in 1963.  2.  Carpal tunnel surgery in .  3.  Hysterectomy many years ago. 4.  Arthritis in both knees with the left knee replaced on 2018, with a good end result. FAMILY HISTORY:  Brother had an MI at 72. Mother  of MI.    SOCIAL HISTORY:  She is 78years old, , 2 children. Does not smoke or drink alcohol. 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   She is also the head of

## 2020-05-27 ENCOUNTER — HOSPITAL ENCOUNTER (OUTPATIENT)
Dept: PHYSICAL THERAPY | Age: 80
Setting detail: THERAPIES SERIES
Discharge: HOME | End: 2020-05-27
Payer: MEDICARE

## 2020-05-27 PROCEDURE — 97161 PT EVAL LOW COMPLEX 20 MIN: CPT

## 2020-05-27 PROCEDURE — 97110 THERAPEUTIC EXERCISES: CPT

## 2020-05-27 ASSESSMENT — PAIN DESCRIPTION - LOCATION: LOCATION: KNEE

## 2020-05-27 ASSESSMENT — PAIN SCALES - GENERAL: PAINLEVEL_OUTOF10: 3

## 2020-05-27 ASSESSMENT — PAIN DESCRIPTION - ORIENTATION: ORIENTATION: RIGHT

## 2020-05-29 ENCOUNTER — HOSPITAL ENCOUNTER (OUTPATIENT)
Dept: PHYSICAL THERAPY | Age: 80
Setting detail: THERAPIES SERIES
Discharge: HOME | End: 2020-05-29
Payer: MEDICARE

## 2020-05-29 PROCEDURE — 97110 THERAPEUTIC EXERCISES: CPT

## 2020-05-29 ASSESSMENT — PAIN SCALES - GENERAL: PAINLEVEL_OUTOF10: 2

## 2020-05-29 NOTE — PRE-CERTIFICATION NOTE
Medicare Cap     [] Physical Therapy  [] Speech Therapy  [] Occupational therapy    *PT and Speech caps combine      $1940 Cap limit < kx modifier needed < $7987 requires pre-cert     Patient Name: Anika Gee  YOB: 1940     Date of [facility] [address] Name $$$ charge Daily Charge YTD   Total $   5/27/20 Anhal, ex 83.82, 30.08 113.90 113.90   5/27/20 Previous 2020 MCR charges 504.94 504.94 618.84   5/29/20 Ex x 2 30.08 x 2 60.16 679.00

## 2020-06-01 ENCOUNTER — HOSPITAL ENCOUNTER (OUTPATIENT)
Dept: PHYSICAL THERAPY | Age: 80
Setting detail: THERAPIES SERIES
Discharge: HOME | End: 2020-06-01
Payer: MEDICARE

## 2020-06-01 PROCEDURE — 97110 THERAPEUTIC EXERCISES: CPT

## 2020-06-01 PROCEDURE — 97140 MANUAL THERAPY 1/> REGIONS: CPT

## 2020-06-01 ASSESSMENT — PAIN DESCRIPTION - LOCATION: LOCATION: KNEE

## 2020-06-01 ASSESSMENT — PAIN SCALES - GENERAL: PAINLEVEL_OUTOF10: 3

## 2020-06-01 ASSESSMENT — PAIN DESCRIPTION - ORIENTATION: ORIENTATION: RIGHT

## 2020-06-01 NOTE — PROGRESS NOTES
Phone: Tamika Whitman      Fax: [phone]                            Outpatient Physical Therapy                                                                            Daily Note    Date: 2020  Patient Name: Iveth Chester        MRN: [id number]   ACCT#:  [de-identified]  : 1940  (78 y.o.)    Referring Practitioner: Dr. Gi Garibay    Referral Date : 20    Diagnosis: S/P Right TKR  Treatment Diagnosis: Right knee pain, S/P TKA    Onset Date: 20  PT Insurance Information: [facility], [employer]  Total # of Visits Approved: 12 Per Physician Order  Total # of Visits to Date: 3  Plan of Care/Certification Expiration Date: 20    Pre-Treatment Pain:  3/10     Assessment  Assessment: Patient c/o dizziness over weakend, feels BP off. /71 at PT. Patient reports kne pain 3/10. THerex and manual therapy per Doc Flow. PROM R knee flexion 105 degrees. Plan to progress step ups to 6 inch step. Chart Reviewed: Yes    Plan  Plan: Continue with current plan    Exercises/Modalities/Manual:  See DocFlow Sheet    Education:         Goals  (Total # of Visits to Date: 3)   Short Term Goals - Time Frame for Short term goals: 9  Short term goal 1: Patient to have increase strength right hip abduction 4+/5 to transfer in and out of car without difficulty  Short term goal 2:  Increase PROM right knee flexion 110 degrees to alteranate feet on stairs WFL             Long Term Goals - Time Frame for Long term goals : 12  Long term goal 1: Improve functional mobility with LEFS score > 64/80  Long term goal 2: Patient to have increase strength right knee extension 4+/5 to walk with gait [facility] without device             Post Treatment Pain:  2/10    Time In: 8:00    Time Out : 8:33        Timed Code Treatment Minutes: 33 Minutes  Total Treatment Time: [address] Carol Urbina PT     Date: 2020

## 2020-06-03 ENCOUNTER — HOSPITAL ENCOUNTER (OUTPATIENT)
Dept: PHYSICAL THERAPY | Age: 80
Setting detail: THERAPIES SERIES
Discharge: HOME | End: 2020-06-03
Payer: MEDICARE

## 2020-06-03 PROCEDURE — 97110 THERAPEUTIC EXERCISES: CPT

## 2020-06-03 PROCEDURE — 97140 MANUAL THERAPY 1/> REGIONS: CPT

## 2020-06-05 ENCOUNTER — HOSPITAL ENCOUNTER (OUTPATIENT)
Dept: PHYSICAL THERAPY | Age: 80
Setting detail: THERAPIES SERIES
Discharge: HOME | End: 2020-06-05
Payer: MEDICARE

## 2020-06-05 PROCEDURE — 97110 THERAPEUTIC EXERCISES: CPT

## 2020-06-05 PROCEDURE — 97140 MANUAL THERAPY 1/> REGIONS: CPT

## 2020-06-05 RX ORDER — PRAMIPEXOLE DIHYDROCHLORIDE 0.5 MG/1
0.5 TABLET ORAL NIGHTLY
Qty: 30 TABLET | Refills: 5 | Status: SHIPPED | OUTPATIENT
Start: 2020-06-05 | End: 2020-12-07 | Stop reason: SDUPTHER

## 2020-06-05 NOTE — PRE-CERTIFICATION NOTE
Medicare Cap     [] Physical Therapy  [] Speech Therapy  [] Occupational therapy    *PT and Speech caps combine      $1940 Cap limit < kx modifier needed < $8807 requires pre-cert     Patient Name: Godwin Gonsalez  YOB: 1940     Date of M[address] Name $$$ charge Daily Charge YTD   Total $   5/27/20 Anhal, ex 83.82, 30.08 113.90 113.90   5/27/20 Previous 2020 MCR charges 504.94 504.94 618.84   5/29/20 Ex x 2 30.08 x 2 60.16 679.00   6/1/20 EX x1, Man x1 30.08, 27.71 57.79 736.79   6/3/20 Ex x 2; manual x 1 30.08 x 2, 27.71 87.87 824.66   6/5/20 Ex x2 Man x1 30.08 x 2, 27.71 87.87 912.53

## 2020-06-05 NOTE — PROGRESS NOTES
Phone: Tamika Whitman      Fax: [phone]                            Outpatient Physical Therapy                                                                            Daily Note    Date: 2020  Patient Name: Rambo Mc        MRN: [id number]   ACCT#:  [de-identified]  : 1940  (78 y.o.)    Referring Practitioner: Dr. Saleem Villegas    Referral Date : 20    Diagnosis: S/P Right TKR  Treatment Diagnosis: Right knee pain, S/P TKA    Onset Date: 20  PT Insurance Information: [facility], [employer]  Total # of Visits Approved: 12 Per Physician Order  Total # of Visits to Date: 5  Plan of Care/Certification Expiration Date: 20    Pre-Treatment Pain:  0/10     Assessment  Assessment: Patient reports no dizziness since last visit. Patient continues to lack knee flexion making it difficult to complete reciprocal gait pattern on stairs. PROM flexion = 107 degrees. Plan to continue to work on ROM and strength to improve with functional activities. Chart Reviewed: Yes    Plan  Plan: Continue with current plan    Exercises/Modalities/Manual:  See DocFlow Sheet    Education:           Goals  (Total # of Visits to Date: 5)   Short Term Goals - Time Frame for Short term goals: 9  Short term goal 1: Patient to have increase strength right hip abduction 4+/5 to transfer in and out of car without difficulty  Short term goal 2:  Increase PROM right knee flexion 110 degrees to alteranate feet on stairs WFL             Long Term Goals - Time Frame for Long term goals : 12  Long term goal 1: Improve functional mobility with LEFS score > 64/80  Long term goal 2: Patient to have increase strength right knee extension 4+/5 to walk with gait [facility] without device             Post Treatment Pain:  0/10        Time In: 0902  Time Out: 0950  Timed Code Treatment Minutes: 48 Minutes  Total Treatment Time: 50 Minutes    Edy Tobar PTA     Date: 2020

## 2020-06-05 NOTE — PRE-CERTIFICATION NOTE
Medicare Cap     [] Physical Therapy  [] Speech Therapy  [] Occupational therapy    *PT and Speech caps combine      $9175 Cap limit < kx modifier needed < $6440 requires pre-cert     Patient Name: Godwin De La Torre: 1940     Date of M[address] Name $$$ charge Daily Charge YTD   Total $   5/27/20 Eval, ex 83.82, 30.08 113.90 113.90   5/27/20 Previous 2020 MCR charges 504.94 504.94 618.84   5/29/20 Ex x 2 30.08 x 2 60.16 679.00   6/1/20 EX x1, Man x1 30.08, 27.71 57.79 736.79   6/3/20 Ex x 2; manual x 1 30.08 x 2, 27.71 87.87 824.66   6/5/20 Ex x2 Man x1

## 2020-06-08 ENCOUNTER — HOSPITAL ENCOUNTER (OUTPATIENT)
Dept: PHYSICAL THERAPY | Age: 80
Setting detail: THERAPIES SERIES
Discharge: HOME | End: 2020-06-08
Payer: MEDICARE

## 2020-06-08 PROCEDURE — 97140 MANUAL THERAPY 1/> REGIONS: CPT

## 2020-06-08 PROCEDURE — 97110 THERAPEUTIC EXERCISES: CPT

## 2020-06-08 NOTE — PRE-CERTIFICATION NOTE
Medicare Cap     [] Physical Therapy  [] Speech Therapy  [] Occupational therapy    *PT and Speech caps combine      $1940 Cap limit < kx modifier needed < $4373 requires pre-cert     Patient Name: Julianne Ortiz  YOB: 1940     Date of M[address] Name $$$ charge Daily Charge YTD   Total $   5/27/20 Anhal, ex 83.82, 30.08 113.90 113.90   5/27/20 Previous 2020 MCR charges 504.94 504.94 618.84   5/29/20 Ex x 2 30.08 x 2 60.16 679.00   6/1/20 EX x1, Man x1 30.08, 27.71 57.79 736.79   6/3/20 Ex x 2; manual x 1 30.08 x 2, 27.71 87.87 824.66   6/5/20 Ex x2 Man x1 30.08 x 2, 27.71 87.87 912.53   6/8/20 Ex  X 2, man x 1 30.08 x 2, 27.71 87.87 1000.40

## 2020-06-08 NOTE — PROGRESS NOTES
Phone: [address]      Fax: [phone]                            Outpatient Physical Therapy                                                                            Daily Note    Date: 2020  Patient Name: Rambo Mc        MRN: [id number]   ACCT#:  [de-identified]  : 1940  (78 y.o.)    Referring Practitioner: Dr. Saleem Villegas    Referral Date : 20    Diagnosis: S/P Right TKR  Treatment Diagnosis: Right knee pain, S/P TKA    Onset Date: 20  PT Insurance Information: [facility], [employer]  Total # of Visits Approved: 12 Per Physician Order  Total # of Visits to Date: 6  No Show: 0  Canceled Appointment: 0  Plan of Care/Certification Expiration Date: 20    Pre-Treatment Pain:  0/10     Assessment  Assessment: /81. (pt reports she has had episodes of high BP. Performed ex as outlined for LE strengthening and mobility to improve arabella to ADLs. PROM improved to 112 deg flexion. will cont to progress. Chart Reviewed: Yes    Plan  Plan: Continue with current plan    Exercises/Modalities/Manual:  See DocFlow Sheet    Goals  (Total # of Visits to Date: 6)   Short Term Goals - Time Frame for Short term goals: 9  Short term goal 1: Patient to have increase strength right hip abduction 4+/5 to transfer in and out of car without difficulty  Short term goal 2:  Increase PROM right knee flexion 110 degrees to alteranate feet on stairs WFL        Long Term Goals - Time Frame for Long term goals : 12  Long term goal 1: Improve functional mobility with LEFS score > 64/80  Long term goal 2: Patient to have increase strength right knee extension 4+/5 to walk with gait WFL without device        Post Treatment Pain:  0/10    Time In: 0845    Time Out : 0930        Timed Code Treatment Minutes: 45 Minutes  Total Treatment Time: 45 Minutes    Matt Pike    PTA  Date: 2020

## 2020-06-08 NOTE — PRE-CERTIFICATION NOTE
Medicare Cap     [] Physical Therapy  [] Speech Therapy  [] Occupational therapy    *PT and Speech caps combine      $6408 Cap limit < kx modifier needed < $9343 requires pre-cert     Patient Name: Anika Gee  YOB: 1940     Date of M[address] Name $$$ charge Daily Charge YTD   Total $   5/27/20 Tish, ex 83.82, 30.08 113.90 113.90   5/27/20 Previous 2020 MCR charges 504.94 504.94 618.84   5/29/20 Ex x 2 30.08 x 2 60.16 679.00   6/1/20 EX x1, Man x1 30.08, 27.71 57.79 736.79   6/3/20 Ex x 2; manual x 1 30.08 x 2, 27.71 87.87 824.66   6/5/20 Ex x2 Man x1 30.08 x 2, 27.71 87.87 912.53   6/8/20 Ex  X 2, man x 1

## 2020-06-10 ENCOUNTER — HOSPITAL ENCOUNTER (OUTPATIENT)
Dept: PHYSICAL THERAPY | Age: 80
Setting detail: THERAPIES SERIES
Discharge: HOME | End: 2020-06-10
Payer: MEDICARE

## 2020-06-10 PROCEDURE — 97110 THERAPEUTIC EXERCISES: CPT

## 2020-06-10 PROCEDURE — 97140 MANUAL THERAPY 1/> REGIONS: CPT

## 2020-06-12 ENCOUNTER — HOSPITAL ENCOUNTER (OUTPATIENT)
Dept: PHYSICAL THERAPY | Age: 80
Setting detail: THERAPIES SERIES
Discharge: HOME | End: 2020-06-12
Payer: MEDICARE

## 2020-06-12 PROCEDURE — 97110 THERAPEUTIC EXERCISES: CPT

## 2020-06-12 PROCEDURE — 97140 MANUAL THERAPY 1/> REGIONS: CPT

## 2020-06-15 ENCOUNTER — HOSPITAL ENCOUNTER (OUTPATIENT)
Dept: PHYSICAL THERAPY | Age: 80
Setting detail: THERAPIES SERIES
Discharge: HOME | End: 2020-06-15
Payer: MEDICARE

## 2020-06-15 PROCEDURE — 97110 THERAPEUTIC EXERCISES: CPT

## 2020-06-15 PROCEDURE — 97140 MANUAL THERAPY 1/> REGIONS: CPT

## 2020-06-17 ENCOUNTER — HOSPITAL ENCOUNTER (OUTPATIENT)
Dept: PHYSICAL THERAPY | Age: 80
Setting detail: THERAPIES SERIES
Discharge: HOME | End: 2020-06-17
Payer: MEDICARE

## 2020-06-17 PROCEDURE — 97140 MANUAL THERAPY 1/> REGIONS: CPT

## 2020-06-17 PROCEDURE — 97110 THERAPEUTIC EXERCISES: CPT

## 2020-06-17 NOTE — PROGRESS NOTES
Phone: [address]            Fax: [phone]                             Outpatient Physical Therapy                                                                      Progress Report    Date: 2020   MRN: [id number]    ACCT#: [de-identified]  Patient: Reji Menendez  : 1940  Referring Practitioner: Dr. Familia Shah    Referral Date : 20    Diagnosis: S/P Right TKR      Treatment Diagnosis: Right knee pain, S/P TKA    Onset Date: 20  PT Insurance Information: [facility], [employer]  Total # of Visits Approved: 12 Per Physician Order  Total # of Visits to Date: 10  No Show: 0  Canceled Appointment: 0    Assessment  Assessment: Pt is progressing well with ROM and strength . PROM to 114 deg flexion. She is ambulating in home and in community on level surfaces without device. She utilizes her cane on uneven ground. Current POC has 2 remaining visits. Pt wishes to cont 1-2 more weeks. Please advise. Prognosis: Good    Plan  Plan: Continue with current plan    Goals  Short term goals  Time Frame for Short term goals: 9  Short term goal 1: Patient to have increase strength right hip abduction 4+/5 to transfer in and out of car without difficulty-met  Short term goal 2:  Increase PROM right knee flexion 110 degrees to alteranate feet on stairs WFL-met    Long term goals  Time Frame for Long term goals : 12  Long term goal 1: Improve functional mobility with LEFS score > 64/80  Long term goal 2: Patient to have increase strength right knee extension 4+/5 to walk with gait WFL without device-met    Zara Pike   PTA  Date: 2020

## 2020-06-17 NOTE — PROGRESS NOTES
Phone: [address]      Fax: [phone]                            Outpatient Physical Therapy                                                                            Daily Note    Date: 2020  Patient Name: George Patel        MRN: [id number]   ACCT#:  [de-identified]  : 1940  (78 y.o.)    Referring Practitioner: Dr. Eric Slater    Referral Date : 20    Diagnosis: S/P Right TKR  Treatment Diagnosis: Right knee pain, S/P TKA    Onset Date: 20  PT Insurance Information: [facility], [employer]  Total # of Visits Approved: 12 Per Physician Order  Total # of Visits to Date: 10  No Show: 0  Canceled Appointment: 0  Plan of Care/Certification Expiration Date: 20    Pre-Treatment Pain:  0/10     Assessment  Assessment: Pt is progressing well with ROM and strength . PROM to 114 deg flexion. She is ambulating in home and in community on level surfaces without device. She utilizes her cane on uneven ground. Current POC has 2 remaining visits. Pt wishes to cont 1-2 more weeks. Please advise. Chart Reviewed: Yes    Plan  Plan: Continue with current plan    Exercises/Modalities/Manual:  See DocFlow Sheet      Goals  (Total # of Visits to Date: 10)   Short Term Goals - Time Frame for Short term goals: 9  Short term goal 1: Patient to have increase strength right hip abduction 4+/5 to transfer in and out of car without difficulty-met  Short term goal 2:  Increase PROM right knee flexion 110 degrees to alteranate feet on stairs WFL-met       Long Term Goals - Time Frame for Long term goals : 12  Long term goal 1: Improve functional mobility with LEFS score > 64/80  Long term goal 2: Patient to have increase strength right knee extension 4+/5 to walk with gait WFL without device-met             Post Treatment Pain:  0/10    Time In: 0845    Time Out : 2165        Timed Code Treatment Minutes: 40 Minutes  Total Treatment Time: [address] Pike

## 2020-06-17 NOTE — PRE-CERTIFICATION NOTE
Medicare Cap     [] Physical Therapy  [] Speech Therapy  [] Occupational therapy    *PT and Speech caps combine      $1940 Cap limit < kx modifier needed < $7601 requires pre-cert     Patient Name: Shahnaz Rudd  YOB: 1940     Date of M[address] Name $$$ charge Daily Charge YTD   Total $   5/27/20 Tish, ex 83.82, 30.08 113.90 113.90   5/27/20 Previous 2020 MCR charges 504.94 504.94 618.84   5/29/20 Ex x 2 30.08 x 2 60.16 679.00   6/1/20 EX x1, Man x1 30.08, 27.71 57.79 736.79   6/3/20 Ex x 2; manual x 1 30.08 x 2, 27.71 87.87 824.66   6/5/20 Ex x2 Man x1 30.08 x 2, 27.71 87.87 912.53   6/8/20 Ex  X 2, man x 1 30.08 x 2, 27.71 87.87 --   6/10/20 Ex x2 Man x1 30.08 x 2, 27.71 87.87 --   6/12/20 Ex x 2, man x 1 30.08 x 2, 27.71 87.87 1176.14   6/15/20 Ex x 2, man x 1 30.08 x 2, 27.71 87.87 1264.01   6/17/20 Ex x 2, man x 1

## 2020-06-19 ENCOUNTER — HOSPITAL ENCOUNTER (OUTPATIENT)
Dept: PHYSICAL THERAPY | Age: 80
Setting detail: THERAPIES SERIES
Discharge: HOME | End: 2020-06-19
Payer: MEDICARE

## 2020-06-19 PROCEDURE — 97110 THERAPEUTIC EXERCISES: CPT

## 2020-06-19 PROCEDURE — 97140 MANUAL THERAPY 1/> REGIONS: CPT

## 2020-06-19 NOTE — PROGRESS NOTES
Phone: [address]      Fax: [phone]                            Outpatient Physical Therapy                                                                            Daily Note    Date: 2020  Patient Name: Rambo Mc        MRN: [id number]   ACCT#:  [de-identified]  : 1940  (78 y.o.)    Referring Practitioner: Dr. Saleem Villegas    Referral Date : 20    Diagnosis: S/P Right TKR  Treatment Diagnosis: Right knee pain, S/P TKA    Onset Date: 20  PT Insurance Information: [facility], [employer]  Total # of Visits Approved: 12 Per Physician Order  Total # of Visits to Date: 11  No Show: 0  Canceled Appointment: 0  Plan of Care/Certification Expiration Date: 20    Pre-Treatment Pain:  0/10     Assessment  Assessment: Patient ambulating without AD and no deviations. Patient followed up with Dr. Keli Novak yesterday and he is very pleased with how patient is doing and he said patient doesn't have to follow up with him unless there is an issue. Completed exercises as outlined. R knee PROM flex = 114 deg. Plan to continue x3 visits and then D/C. Chart Reviewed: Yes    Plan  Plan: Continue with current plan    Exercises/Modalities/Manual:  See DocFlow Sheet    Education:           Goals  (Total # of Visits to Date: 6)   Short Term Goals - Time Frame for Short term goals: 9  Short term goal 1: Patient to have increase strength right hip abduction 4+/5 to transfer in and out of car without difficulty-met  Short term goal 2:  Increase PROM right knee flexion 110 degrees to alteranate feet on stairs WFL-met             Long Term Goals - Time Frame for Long term goals : 12  Long term goal 1: Improve functional mobility with LEFS score > 64/80  Long term goal 2: Patient to have increase strength right knee extension 4+/5 to walk with gait WFL without device-met             Post Treatment Pain:  0/10    Time In: 0901    Time Out : 0946   Timed Code Treatment

## 2020-06-22 ENCOUNTER — HOSPITAL ENCOUNTER (OUTPATIENT)
Dept: PHYSICAL THERAPY | Age: 80
Setting detail: THERAPIES SERIES
Discharge: HOME | End: 2020-06-22
Payer: MEDICARE

## 2020-06-22 PROCEDURE — 97140 MANUAL THERAPY 1/> REGIONS: CPT

## 2020-06-22 PROCEDURE — 97110 THERAPEUTIC EXERCISES: CPT

## 2020-06-22 NOTE — PRE-CERTIFICATION NOTE
Medicare Cap     [] Physical Therapy  [] Speech Therapy  [] Occupational therapy    *PT and Speech caps combine      $1940 Cap limit < kx modifier needed < $2728 requires pre-cert     Patient Name: Pieter Wood  YOB: 1940     Date of M[address] Name $$$ charge Daily Charge YTD   Total $   5/27/20 Tish, ex 83.82, 30.08 113.90 113.90   5/27/20 Previous 2020 MCR charges 504.94 504.94 618.84   5/29/20 Ex x 2 30.08 x 2 60.16 679.00   6/1/20 EX x1, Man x1 30.08, 27.71 57.79 736.79   6/3/20 Ex x 2; manual x 1 30.08 x 2, 27.71 87.87 824.66   6/5/20 Ex x2 Man x1 30.08 x 2, 27.71 87.87 912.53   6/8/20 Ex x 2, man x1 30.08 x 2, 27.71 87.87 --   6/10/20 Ex x 2 Man x 1 30.08 x 2, 27.71 87.87 --   6/12/20 Ex x 2, man x 1 30.08 x 2, 27.71 87.87 1176.14   6/15/20 Ex x 2, man x 1 30.08 x 2, 27.71 87.87 1264.01   6/17/20 Ex x 2, man x 1 30.08 x 2, 27.71 87.87 1351.88   6/19/20 Ex x 2, man x 1 30.08 x 2, 27.71 87.87 1439.75   6/22/20 Ex x 2, man x 1 30.08 x 2, 27.71 87.87 1527.62

## 2020-06-24 ENCOUNTER — HOSPITAL ENCOUNTER (OUTPATIENT)
Dept: PHYSICAL THERAPY | Age: 80
Setting detail: THERAPIES SERIES
Discharge: HOME | End: 2020-06-24
Payer: MEDICARE

## 2020-06-24 PROCEDURE — 97110 THERAPEUTIC EXERCISES: CPT

## 2020-06-24 PROCEDURE — 97140 MANUAL THERAPY 1/> REGIONS: CPT

## 2020-06-24 NOTE — PROGRESS NOTES
Phone: [address]      Fax: [phone]                            Outpatient Physical Therapy                                                                            Daily Note    Date: 2020  Patient Name: Jeanne Venegas        MRN: [id number]   ACCT#:  [de-identified]  : 1940  (78 y.o.)    Referring Practitioner: Dr. Zeny Romano    Referral Date : 20    Diagnosis: S/P Right TKR  Treatment Diagnosis: Right knee pain, S/P TKA    Onset Date: 20  PT Insurance Information: [facility], [employer]  Total # of Visits Approved: 14 Per Physician Order  Total # of Visits to Date: 15  Plan of Care/Certification Expiration Date: 20    Pre-Treatment Pain:  0/10     Assessment  Assessment: Pt 7 min late for appt. Performed ex as outlined. PROM to 117 deg flexion. Pt notes no pain overall just occasional acing. Scheduled 1 more visit. Anticipate DC. Chart Reviewed: Yes    Plan  Plan: Continue with current plan    Exercises/Modalities/Manual:  See DocFlow Sheet        Goals  (Total # of Visits to Date: 15)   Short Term Goals - Time Frame for Short term goals: 9  Short term goal 1: Patient to have increase strength right hip abduction 4+/5 to transfer in and out of car without difficulty-met  Short term goal 2:  Increase PROM right knee flexion 110 degrees to alteranate feet on stairs WFL-met             Long Term Goals - Time Frame for Long term goals : 14  Long term goal 1: Improve functional mobility with LEFS score > 64/80  Long term goal 2: Patient to have increase strength right knee extension 4+/5 to walk with gait [facility] without device-met             Post Treatment Pain:  0/10    Time In: 0852     Time Out : 0930        Timed Code Treatment Minutes: 38 Minutes  Total Treatment Time: 38 Minutes    Andres Pike   PTA  Date: 2020

## 2020-06-26 ENCOUNTER — HOSPITAL ENCOUNTER (OUTPATIENT)
Dept: PHYSICAL THERAPY | Age: 80
Setting detail: THERAPIES SERIES
Discharge: HOME | End: 2020-06-26
Payer: MEDICARE

## 2020-06-26 PROCEDURE — 97140 MANUAL THERAPY 1/> REGIONS: CPT

## 2020-06-26 PROCEDURE — 97110 THERAPEUTIC EXERCISES: CPT

## 2020-06-26 NOTE — PROGRESS NOTES
Phone: Tam[address] Whitman      Fax: [phone]                            Outpatient Physical Therapy                                                                            Daily Note    Date: 2020  Patient Name: Trena Pino        MRN: [id number]   ACCT#:  [de-identified]  : 1940  (78 y.o.)    Referring Practitioner: Dr. Radha Baum    Referral Date : 20    Diagnosis: S/P Right TKR  Treatment Diagnosis: Right knee pain, S/P TKA    Onset Date: 20  PT Insurance Information: [facility], [employer]  Total # of Visits Approved: 14 Per Physician Order  Total # of Visits to Date: 15  Plan of Care/Certification Expiration Date: 20    Pre-Treatment Pain:  0/10     Assessment  Assessment: Patient reports she is back to her normal activity level and continues with 0/10 pain. Encouraged pt to continue to complete exercises and stretches at home. LEFS score = 61/80  Chart Reviewed: Yes    Plan  Plan: Discharge    Exercises/Modalities/Manual:  See DocFlow Sheet    Education:           Goals  (Total # of Visits to Date: 15)   Short Term Goals - Time Frame for Short term goals: 9  Short term goal 1: Patient to have increase strength right hip abduction 4+/5 to transfer in and out of car without difficulty-met  Short term goal 2:  Increase PROM right knee flexion 110 degrees to alteranate feet on stairs WFL-met             Long Term Goals - Time Frame for Long term goals : 14  Long term goal 1: Improve functional mobility with LEFS score > 64/80  Long term goal 2: Patient to have increase strength right knee extension 4+/5 to walk with gait WFL without device-met             Post Treatment Pain:  0/10      Time In: 1415  Time Out: 1455  Timed Code Treatment Minutes: 40 Minutes  Total Treatment Time: 36 Minutes    Jose Quezada, LINA     Date: 2020

## 2020-06-27 NOTE — DISCHARGE SUMMARY
Phone: [address]             Fax: [phone]                            Outpatient Physical Therapy                                                                    Discharge Summary    Patient: Sheryl Dougherty  : 1940  ACCT #: [de-identified]   Referring Practitioner: Dr. Jessica Mckinney      Diagnosis: S/P Right TKR  Date Treatment Initiated: 20  Date of Last Treatment: 20    PT Visit Information  Onset Date: 20  PT Insurance Information: [facility], [employer]  Total # of Visits Approved: 14  Total # of Visits to Date: 14  Plan of Care/Certification Expiration Date: 20    Frequency/Duration  Days: 3 times per week  Weeks: 4 weeks    Treatment Received  Patient Education/HEP, Therapeutic Exercise, Manual Therapy: Myofacial Release/Cupping and Gait Training       Assessment  Assessment: Patient reports she is back to her normal activity level and continues with 0/10 pain. ROM right knee 0-117 degrees. Strength R knee 4+/5. Gait WFL without device. Patient alternating feet on stairs WFL. LEFS score = 61/80    Ambulation 1  Device: No Device  Quality of Gait: WFL    Reason for Discharge  Goals Met    Comments:   Thank you for this referral      Anne Main  Date: 2020

## 2020-09-18 ENCOUNTER — OFFICE VISIT (OUTPATIENT)
Dept: PRIMARY CARE CLINIC | Age: 80
End: 2020-09-18
Payer: MEDICARE

## 2020-09-18 VITALS
SYSTOLIC BLOOD PRESSURE: 148 MMHG | OXYGEN SATURATION: 96 % | BODY MASS INDEX: 31.16 KG/M2 | HEART RATE: 82 BPM | DIASTOLIC BLOOD PRESSURE: 74 MMHG | WEIGHT: 166 LBS | TEMPERATURE: 97.9 F

## 2020-09-18 PROBLEM — Z82.49 FAMILY HISTORY OF ISCHEMIC HEART DISEASE AND OTHER DISEASES OF THE CIRCULATORY SYSTEM: Status: ACTIVE | Noted: 2020-09-18

## 2020-09-18 PROBLEM — M17.11 PRIMARY OSTEOARTHRITIS OF RIGHT KNEE: Status: ACTIVE | Noted: 2020-02-07

## 2020-09-18 PROBLEM — E55.9 VITAMIN D DEFICIENCY: Status: ACTIVE | Noted: 2020-09-18

## 2020-09-18 PROCEDURE — 1123F ACP DISCUSS/DSCN MKR DOCD: CPT | Performed by: NURSE PRACTITIONER

## 2020-09-18 PROCEDURE — 99213 OFFICE O/P EST LOW 20 MIN: CPT | Performed by: NURSE PRACTITIONER

## 2020-09-18 PROCEDURE — G8417 CALC BMI ABV UP PARAM F/U: HCPCS | Performed by: NURSE PRACTITIONER

## 2020-09-18 PROCEDURE — 4040F PNEUMOC VAC/ADMIN/RCVD: CPT | Performed by: NURSE PRACTITIONER

## 2020-09-18 PROCEDURE — 1036F TOBACCO NON-USER: CPT | Performed by: NURSE PRACTITIONER

## 2020-09-18 PROCEDURE — G8400 PT W/DXA NO RESULTS DOC: HCPCS | Performed by: NURSE PRACTITIONER

## 2020-09-18 PROCEDURE — G8427 DOCREV CUR MEDS BY ELIG CLIN: HCPCS | Performed by: NURSE PRACTITIONER

## 2020-09-18 PROCEDURE — 1090F PRES/ABSN URINE INCON ASSESS: CPT | Performed by: NURSE PRACTITIONER

## 2020-09-18 RX ORDER — METHYLPREDNISOLONE 4 MG/1
TABLET ORAL
Qty: 1 KIT | Refills: 0 | Status: SHIPPED | OUTPATIENT
Start: 2020-09-18 | End: 2020-09-24

## 2020-09-18 RX ORDER — PANTOPRAZOLE SODIUM 20 MG/1
20 TABLET, DELAYED RELEASE ORAL DAILY
COMMUNITY
Start: 2020-05-05 | End: 2020-12-16

## 2020-09-18 RX ORDER — CETIRIZINE HYDROCHLORIDE 10 MG/1
10 TABLET ORAL DAILY
Qty: 30 TABLET | Refills: 0 | Status: SHIPPED | OUTPATIENT
Start: 2020-09-18 | End: 2020-10-18

## 2020-09-18 RX ORDER — ONDANSETRON 4 MG/1
TABLET, FILM COATED ORAL
COMMUNITY
Start: 2020-05-04 | End: 2020-12-16

## 2020-09-18 ASSESSMENT — ENCOUNTER SYMPTOMS
GASTROINTESTINAL NEGATIVE: 1
EYES NEGATIVE: 1
RESPIRATORY NEGATIVE: 1
ALLERGIC/IMMUNOLOGIC NEGATIVE: 1

## 2020-09-18 NOTE — PATIENT INSTRUCTIONS
SURVEY:    You may be receiving a survey from [employer] regarding your visit today. Please complete the survey to enable us to provide the highest quality of care to you and your family. If you cannot score us a very good on any question, please call the office to discuss how we could of made your experience a very good one. Thank you.

## 2020-09-18 NOTE — PROGRESS NOTES
[address] [facility] WALK-IN CARE  [address] [place] [address]    [facility] [address]  Dept: [phone]  Dept Fax: [phone]    Austin Caruso is a [de-identified] y.o. female who presents to the [address] [place] [address] in Care today for her medical conditions/complaints as noted below. Austin Caruso is c/o of Insect Bite (Patient presents today with a bee sting on the top of her right foot the happened on Wednesday. Patient has tried cortisone, benadryl and bag balm without any improvement. The foot began to swell her toes and lower leg area. Patient states the site itches. )      HPI:    Austin Caruso is a [de-identified] y.o. female who presents with  Bee sting. Bee sting on Wednesday on top of right foot. Area itches with redness and swelling. Has used neosporin and bag balm. This helped a little bit but the itching is keeping her up. She has also been taking Tylenol. Unable to ice her foot yesterday because she was busy running errands. Past Medical History:   Diagnosis Date    Urinary incontinence     bladder dropped        Current Outpatient Medications   Medication Sig Dispense Refill    cetirizine (ZYRTEC) 10 MG tablet Take 1 tablet by mouth daily 30 tablet 0    methylPREDNISolone (MEDROL DOSEPACK) 4 MG tablet Take by mouth. 1 kit 0    pramipexole (MIRAPEX) 0.5 MG tablet Take 1 tablet by mouth nightly 30 tablet 5    Acetaminophen (TYLENOL) 325 MG CAPS Take by mouth daily as needed      pantoprazole (PROTONIX) 20 MG tablet Take 20 mg by mouth daily      ondansetron (ZOFRAN) 4 MG tablet Take one pill every 6 hours as needed for nausea or vomiting . No current facility-administered medications for this visit. Allergies   Allergen Reactions    Macrobid [Nitrofurantoin Monohydrate Macrocrystals] Itching, Rash and Other (See Comments)     headache    Nitrofurantoin Itching, Other (See Comments) and Rash       Subjective:      Review of Systems   Constitutional: Negative.     HENT: Negative. Eyes: Negative. Respiratory: Negative. Cardiovascular: Negative. Gastrointestinal: Negative. Endocrine: Negative. Genitourinary: Negative. Musculoskeletal: Negative. Skin: Positive for wound. Allergic/Immunologic: Negative. Neurological: Negative. Hematological: Negative. Psychiatric/Behavioral: Negative. Objective:     Physical Exam  Vitals signs and nursing note reviewed. Constitutional:       Appearance: Normal appearance. HENT:      Head: Normocephalic. Right Ear: External ear normal.      Left Ear: External ear normal.      Nose: Nose normal.   Cardiovascular:      Rate and Rhythm: Normal rate and regular rhythm. Heart sounds: Normal heart sounds. Pulmonary:      Effort: Pulmonary effort is normal.      Breath sounds: Normal breath sounds. Skin:     General: Skin is warm. Capillary Refill: Capillary refill takes less than 2 seconds. Findings: Erythema present. Comments: Dorsal aspect of right foot with mild erythema and edema. Small open area on dorsal right foot from scratching, no drainage noted. Neurological:      General: No focal deficit present. Mental Status: She is alert. Psychiatric:         Mood and Affect: Mood normal.       BP (!) 148/74 (Site: Left Upper Arm, Position: Sitting, Cuff Size: Medium Adult)   Pulse 82   Temp 97.9 °F (36.6 °C) (Oral)   Wt 166 lb (75.3 kg)   SpO2 96%   BMI 31.16 kg/m²     Assessment:      Diagnosis Orders   1. Bee sting, accidental or unintentional, initial encounter       No results found for this visit on 09/18/20. Plan:   Medrol Dose pack as prescribed. Zyrtec once a day for itching. Ice and elevate for 20 minutes 3-4 times a day. Follow up with PCP if no improvement in 2-3 days or worsening of symptoms or fever develops. No follow-ups on file.     Orders Placed This Encounter   Medications    cetirizine (ZYRTEC) 10 MG tablet     Sig: Take 1 tablet by mouth

## 2020-12-07 RX ORDER — PRAMIPEXOLE DIHYDROCHLORIDE 0.5 MG/1
0.5 TABLET ORAL NIGHTLY
Qty: 30 TABLET | Refills: 1 | Status: SHIPPED | OUTPATIENT
Start: 2020-12-07 | End: 2020-12-16 | Stop reason: SDUPTHER

## 2020-12-07 NOTE — TELEPHONE ENCOUNTER
Please tell pt she is over a year from appt so due for yearly ck up.  I gave her medication 1mos and refill so she can make appt after holiday (January) for ck up

## 2020-12-07 NOTE — TELEPHONE ENCOUNTER
Patient will take her last Mirapex today - could we send a new script to [facility] for her    Health Maintenance   Topic Date Due    DTaP/Tdap/Td vaccine (1 - Tdap) 07/01/1959    Shingles Vaccine (1 of 2) 07/01/1990    DEXA (modify frequency per FRAX score)  07/01/1995    Pneumococcal 65+ years Vaccine (2 of 2 - PPSV23) 07/01/2005    Annual Wellness Visit (AWV)  05/29/2019    Flu vaccine (1) 09/01/2020    Hepatitis A vaccine  Aged Out    Hepatitis B vaccine  Aged Out    Hib vaccine  Aged Out    Meningococcal (ACWY) vaccine  Aged Out             (applicable per patient's age: Cancer Screenings, Depression Screening, Fall Risk Screening, Immunizations)    LDL Cholesterol (mg/dL)   Date Value   02/27/2020 89     LDL Calculated (mg/dL)   Date Value   05/17/2018 123     AST (U/L)   Date Value   02/27/2020 17     ALT (U/L)   Date Value   02/27/2020 12     BUN (mg/dL)   Date Value   02/27/2020 13      (goal A1C is < 7)   (goal LDL is <100) need 30-50% reduction from baseline     BP Readings from Last 3 Encounters:   09/18/20 (!) 148/74   02/28/20 (!) 148/60   10/25/19 130/60    (goal /80)      All Future Testing planned in [facility]:  Lab Frequency Next Occurrence       Next Visit Date:  No future appointments.          Patient Active Problem List:     Arthritis     Vitamin D deficiency     Primary osteoarthritis of right knee     Family history of ischemic heart disease and other diseases of the circulatory system

## 2020-12-07 NOTE — TELEPHONE ENCOUNTER
Patient scheduled for check up 12/16 - would also like to do her mammogram the same day - states that she isn't having any problems

## 2020-12-07 NOTE — TELEPHONE ENCOUNTER
Please tell pt I put the order in computer and so someone should call her from [facility] this week and then she ask them to schedule for Dec 16

## 2020-12-08 NOTE — TELEPHONE ENCOUNTER
LVM for patient to call us back to have her call central scheduling to set up due to her wanting a specific day.

## 2020-12-16 ENCOUNTER — OFFICE VISIT (OUTPATIENT)
Dept: FAMILY MEDICINE CLINIC | Age: 80
End: 2020-12-16
Payer: MEDICARE

## 2020-12-16 VITALS
BODY MASS INDEX: 31.53 KG/M2 | DIASTOLIC BLOOD PRESSURE: 70 MMHG | OXYGEN SATURATION: 96 % | WEIGHT: 167 LBS | HEIGHT: 61 IN | HEART RATE: 80 BPM | SYSTOLIC BLOOD PRESSURE: 128 MMHG

## 2020-12-16 PROCEDURE — G0439 PPPS, SUBSEQ VISIT: HCPCS | Performed by: FAMILY MEDICINE

## 2020-12-16 PROCEDURE — 4040F PNEUMOC VAC/ADMIN/RCVD: CPT | Performed by: FAMILY MEDICINE

## 2020-12-16 PROCEDURE — 1123F ACP DISCUSS/DSCN MKR DOCD: CPT | Performed by: FAMILY MEDICINE

## 2020-12-16 PROCEDURE — G8484 FLU IMMUNIZE NO ADMIN: HCPCS | Performed by: FAMILY MEDICINE

## 2020-12-16 RX ORDER — PRAMIPEXOLE DIHYDROCHLORIDE 1 MG/1
1 TABLET ORAL NIGHTLY
Qty: 30 TABLET | Refills: 5 | Status: SHIPPED | OUTPATIENT
Start: 2020-12-16 | End: 2021-06-21 | Stop reason: SDUPTHER

## 2020-12-16 ASSESSMENT — PATIENT HEALTH QUESTIONNAIRE - PHQ9
2. FEELING DOWN, DEPRESSED OR HOPELESS: 0
SUM OF ALL RESPONSES TO PHQ9 QUESTIONS 1 & 2: 0
SUM OF ALL RESPONSES TO PHQ QUESTIONS 1-9: 0
SUM OF ALL RESPONSES TO PHQ QUESTIONS 1-9: 0
1. LITTLE INTEREST OR PLEASURE IN DOING THINGS: 0
SUM OF ALL RESPONSES TO PHQ QUESTIONS 1-9: 0

## 2020-12-16 ASSESSMENT — LIFESTYLE VARIABLES: HOW OFTEN DO YOU HAVE A DRINK CONTAINING ALCOHOL: 0

## 2020-12-16 NOTE — PROGRESS NOTES
Medicare Annual Wellness Visit       Brad Mcdaniel  YOB: 1940    Date of Service:  12/16/2020    Patient Active Problem List   Diagnosis    Arthritis    Vitamin D deficiency    Primary osteoarthritis of right knee    Family history of ischemic heart disease and other diseases of the circulatory system       Allergies   Allergen Reactions    Macrobid [Nitrofurantoin Monohydrate Macrocrystals] Itching, Rash and Other (See Comments)     headache    Nitrofurantoin Itching, Other (See Comments) and Rash     No outpatient medications have been marked as taking for the 12/16/20 encounter (Office Visit) with Alexander Obrien MD.       Past Medical History:   Diagnosis Date    Urinary incontinence     bladder dropped     Past Surgical History:   Procedure Laterality Date    APPENDECTOMY      CARPAL TUNNEL RELEASE  1963    HYSTERECTOMY      KNEE ARTHROSCOPY      Bilat     Family History   Problem Relation Age of Onset    Heart Attack Mother     Heart Attack Brother 72        9/8/11    Other Sister         Brain aneurysm    Cancer Maternal Aunt         Breast     Social History     Socioeconomic History    Marital status:      Spouse name: Not on file    Number of children: Not on file    Years of education: Not on file    Highest education level: Not on file   Occupational History    Not on file   Social Needs    Financial resource strain: Not on file    Food insecurity     Worry: Not on file     Inability: Not on file    Transportation needs     Medical: Not on file     Non-medical: Not on file   Tobacco Use    Smoking status: Never Smoker    Smokeless tobacco: Never Used   Substance and Sexual Activity    Alcohol use: No    Drug use: No    Sexual activity: Not on file   Lifestyle    Physical activity     Days per week: Not on file     Minutes per session: Not on file    Stress: Not on file   Relationships    Social connections     Talks on phone: Not on file Gets together: Not on file     Attends Baptism service: Not on file     Active member of club or organization: Not on file     Attends meetings of clubs or organizations: Not on file     Relationship status: Not on file    Intimate partner violence     Fear of current or ex partner: Not on file     Emotionally abused: Not on file     Physically abused: Not on file     Forced sexual activity: Not on file   Other Topics Concern    Not on file   Social History Narrative    Not on file       Consultants:   Patient Care Team:  Devin Verma MD as PCP - General (Family Medicine)  Devin Verma MD as PCP [facility]  Martin Hummel MD (Orthopedic Surgery)    Wt Readings from Last 3 Encounters:   12/16/20 167 lb (75.8 kg)   09/18/20 166 lb (75.3 kg)   02/28/20 166 lb (75.3 kg)     BP Readings from Last 3 Encounters:   12/16/20 128/70   09/18/20 (!) 148/74   02/28/20 (!) 148/60       Pertinent Physical Exam    Vitals:    12/16/20 0957   BP: 128/70   Pulse: 80   SpO2: 96%   Weight: 167 lb (75.8 kg)   Height: 5' 1\" (1.549 m)     Body mass index is 31.55 kg/m².      ROS (information related to health maintenance and screening)  10 systems reviewed and pt is only positive for the restless legs a night and arthritis pain in hands    Physical Exam (Minimal exam needed for wellness visit)  General Appearance: alert and oriented to person, place and time, well-developed and well-nourished, in no acute distress  Skin: warm and dry, no rash or erythema  Head: normocephalic and atraumatic  Eyes: pupils equal, conjunctivae normal  ENT: bilateral tympanic membrane normal, bilateral external ear normal and bilateral ear canal normal  Neck: neck supple and non tender without mass, no thyromegaly, no cervical lymphadenopathy   Pulmonary/Chest: clear to auscultation bilaterally- no wheezes, rales or rhonchi, normal air movement  Cardiovascular: normal rate, regular rhythm, no murmurs and no carotid bruits Abdomen: soft, non-tender, non-distended, normal bowels sounds, and no masses  Pelvic: examination not indicated  Breast: not examined  Extremities: no erythema, swelling or tenderness of extremities  Musculoskeletal: normal range of motion, no joint swelling. Bilateral hands tender at the thumb base bilaterally. Neurologic: no cranial nerve deficit, gait and coordination normal and speech normal    Current Health Maintenance Status  Health Maintenance   Topic Date Due    DTaP/Tdap/Td vaccine (1 - Tdap) 07/01/1959    Shingles Vaccine (1 of 2) 07/01/1990    DEXA (modify frequency per FRAX score)  07/01/1995    Annual Wellness Visit (AWV)  05/29/2019    Pneumococcal 65+ years Vaccine (2 of 2 - PPSV23) 05/04/2021    Flu vaccine  Completed    Hepatitis A vaccine  Aged Out    Hepatitis B vaccine  Aged Out    Hib vaccine  Aged Out    Meningococcal (ACWY) vaccine  Aged [employer] AWV Workflow and Risk Assessment    Review Medicare Health Risk Assessment form completed by patient  Document vitals, height included (3 vital signs minimum)      Update Allergies and Medications    complete  Update Family and Social History (HRA #2-6)    complete  Update Health Maintenance (HRA #7-13)    complete  Update Vaccines in Historical Immunizations (HRA #9-10)    incomplete  Update Patient Care Team- in [place] ([place] #14)     complete  Has patient seen an eye specialist within the past 2 years (HRA #7)? no -- pt is due for exam but office closed due ot Prabhu    Has patient seen a dentist within the past year (HRA #11)? yes    Medical Suppliers Used (diabetic supplies, 02, medical equipment, etc.) (HRA #14):  none    End of Life Planning (HRA # 15): Advanced Directive:  yes,   copy on file      [address] form completed by patient, reviewed and scanned into chart. HRA interpretation guide- enter risks identified through screenings into table below    1.   Behavioral Risks: Tobacco:  If patient responded \"yes\" to HRA question #6, screen is positive. Result - negative  Alcohol use: Add up scores of alcohol use questions (HRA # 3-5)- positive result is 3 or above for women and 4 or above for men. Result - negative  Physical activity:  If patient responded \"no\" to HRA question #16, screen is positive. Result - negative  Nutrition:  Body mass index is 31.55 kg/m². Chetan Salter If patient responded \"yes\" to HRA question #17, or BMI <18 or >30, screen is positive. Result - positive    BEHAVIORAL RISKS IDENTIFIED:   Monitor diet      2. Psychosocial Risks:  Anxiety:  Add up scores of anxiety questions (HRA #18-19)- positive result is 3 or above, or if patient has current or past diagnosis of anxiety. Result - negative  Depression:  Add up scores of PHQ-2 (HRA #20-21): positive result is 3 or above, or if patient has current or past diagnosis of depression. Result - negative  Social/Emotional support:  If patient responded \"sometimes,\" or \"rarely/never\" to HRA question #22, screen is positive. Result - negative  Pain:  If patient responded \"a lot\" to HRA question #24, screen is positive. Result - restless legs that keep her up at night    PSYCHOSOCIAL RISKS IDENTIFIED:   None identified      3. Functional/Safety Risks:    Memory:  Mini-Cognitive Exam   A. Mini-Cog Screen:  Give patient the following words to remember and ask to repeat- advise patient that she will be asked to recall items later:  Devang VALDOVINOS [address], [facility]. Clock Drawing Test (CDT): Have patient draw the face of a clock and put the numbers in the correct positions. Then draw in the hands at ten minutes after eleven.   Score clock drawing test as \"normal\" if the patient places the correct time and the clock appears grossly normal, or \"abnormal.\"    CDT: Normal C. The patient is then asked to recall the three words. (This should be done after one minute, or after the Clock Drawing Test is complete)(if remembers all 3 or none of the words, then do not need to perform CDT)   Mini-Cog Scorin/3 words recalled       MINI-COG INTERPRETATION: 1 or 2 with normal CDT- Negative for cognitive impairment     Living situation:  If patient responded \"lives alone\" to HRA question #23, screen is positive. Result - negative  Hearing: If patient responded \"yes\" to HRA question #25, screen is positive. Result - positive  Safety:  If patient responded \"no\" to any of the HRA questions #26-31, screen is positive. Result - negative  ADLs:  If patient responded \"yes\" to HRA questions #32 or #33, screen is positive. Result - negative  Fall risk:  Per MA note, If timed Get Up & Go test unsteady or longer than 20 seconds, or falls reported per HRA question #34, screen is positive. Complete outpatient fall risk assessment in document flow sheet     Result - negative    Vision:  (corrected vision)    Left eye  Right eye  Both eyes      20/    20/    20/       SAFETY RISKS IDENTIFIED:   None identified      Scan HRA form into media section of chart. Perform Vision Screening at Visit not performed      Personalized Preventive Plan   This plan is provided to the patient in written form.        Preventive plan of care for Delphine Cavanaugh        2020           Preventive Measures Status       Recommendations for screening   Colon Cancer Screen   Last colonoscopy: unknown Screening covered every 10 years  This test is not clinically indicated   Breast Cancer Screen  Last mammogram: 19 Covered annually  This test is not clinically indicated   Cervical Cancer Screen   Last PAP smear: unknown Covered every 2 years, annually if high risk  This test is not clinically indicated   Osteoporosis Screen   Last DXA scan: unknown Covered every 2 years  Recommended, but declined Diabetes Screen  Glucose (mg/dL)   Date Value   02/27/2020 107 (H)   03/14/2012 96    Screening covered annually, every 6 months if pre-diabetic  Repeat every 3 years   Cholesterol Screen  Lab Results   Component Value Date    CHOL 188 02/27/2020    TRIG 177 (H) 02/27/2020    HDL 64 02/27/2020    [address] 123 05/17/2018    LDLCHOLESTEROL 89 02/27/2020    Screening covered every 5 years   Repeat in 5 years   Abdominal Aneurysm Screen  No Screening covered once between the ages of 72 and 76 for any male who has smoked at least 100 cigarettes in his lifetime or with FH of aneurysm (must be ordered during Welcome to Medicare Physical- IPPE, not covered with AWV)   This test is not clinically indicated   Aspirin for Cardiovascular Prevention   No Not indicated    Recommended Immunizations    Immunization History   Administered Date(s) Administered    Influenza, High-dose, Quadv, 65 yrs +, IM (Fluzone) 10/07/2020    Pneumococcal Conjugate 13-valent (Mona Simmonds) 05/04/2020      Influenza vaccine: recommended every fall  Pneumonia vaccine: no need to repeat  Shingles vaccine: recommended, but declined  Tetanus vaccine: recommended but declined        Risk Factors and Conditions Identified During Visit  (Provider completes sections below)   Risks Identified Treatment options   Behavioral Risks  · None identified   · No treatment is necessary   Psychosocial Risks  · None identified   · No treatment is necessary    Functional/Safety Risks  · None identified   · No treatment is necessary     Other Recommendations/Plans ·   pt to consider shingles vaccine               Visit Diagnosis   Diagnosis Orders   1. Medicare annual wellness visit, initial     2. Restless leg syndrome  Pt to increase her mirapex from 0.5mg to 1mg nightly.  Pt to also try a 4oz glass of plain tonic water at night and stretching legs in evening

## 2020-12-16 NOTE — PATIENT INSTRUCTIONS
Survey: You may be receiving a survey from [employer] regarding your visit today. You may get this in the mail, through your MyChart or in your email. Please complete the survey to enable us to provide the highest quality of care to you and your family. Please also, mention our names. If you cannot score us as very good (5 Stars) on any question, please feel free to call the office to discuss how we could have made your experience exceptional.      Thank You!         MD Rosalia Boyd, WESLYN

## 2020-12-21 ENCOUNTER — HOSPITAL ENCOUNTER (OUTPATIENT)
Dept: MAMMOGRAPHY | Age: 80
Discharge: HOME | End: 2020-12-23
Payer: MEDICARE

## 2020-12-21 PROCEDURE — 77063 BREAST TOMOSYNTHESIS BI: CPT

## 2021-06-21 RX ORDER — PRAMIPEXOLE DIHYDROCHLORIDE 1 MG/1
1 TABLET ORAL NIGHTLY
Qty: 30 TABLET | Refills: 0 | Status: SHIPPED | OUTPATIENT
Start: 2021-06-21 | End: 2021-06-30 | Stop reason: SDUPTHER

## 2021-06-30 ENCOUNTER — OFFICE VISIT (OUTPATIENT)
Dept: FAMILY MEDICINE CLINIC | Age: 81
End: 2021-06-30
Payer: MEDICARE

## 2021-06-30 VITALS
HEIGHT: 61 IN | WEIGHT: 175 LBS | DIASTOLIC BLOOD PRESSURE: 68 MMHG | BODY MASS INDEX: 33.04 KG/M2 | SYSTOLIC BLOOD PRESSURE: 134 MMHG | OXYGEN SATURATION: 96 % | HEART RATE: 84 BPM

## 2021-06-30 DIAGNOSIS — G25.81 RESTLESS LEG SYNDROME: ICD-10-CM

## 2021-06-30 DIAGNOSIS — G44.89 OTHER HEADACHE SYNDROME: ICD-10-CM

## 2021-06-30 DIAGNOSIS — M19.90 ARTHRITIS: Primary | ICD-10-CM

## 2021-06-30 DIAGNOSIS — R42 DIZZINESS: ICD-10-CM

## 2021-06-30 PROCEDURE — G8427 DOCREV CUR MEDS BY ELIG CLIN: HCPCS | Performed by: FAMILY MEDICINE

## 2021-06-30 PROCEDURE — 1123F ACP DISCUSS/DSCN MKR DOCD: CPT | Performed by: FAMILY MEDICINE

## 2021-06-30 PROCEDURE — G8400 PT W/DXA NO RESULTS DOC: HCPCS | Performed by: FAMILY MEDICINE

## 2021-06-30 PROCEDURE — G8417 CALC BMI ABV UP PARAM F/U: HCPCS | Performed by: FAMILY MEDICINE

## 2021-06-30 PROCEDURE — 99213 OFFICE O/P EST LOW 20 MIN: CPT | Performed by: FAMILY MEDICINE

## 2021-06-30 PROCEDURE — 1090F PRES/ABSN URINE INCON ASSESS: CPT | Performed by: FAMILY MEDICINE

## 2021-06-30 PROCEDURE — 4040F PNEUMOC VAC/ADMIN/RCVD: CPT | Performed by: FAMILY MEDICINE

## 2021-06-30 PROCEDURE — 1036F TOBACCO NON-USER: CPT | Performed by: FAMILY MEDICINE

## 2021-06-30 RX ORDER — PRAMIPEXOLE DIHYDROCHLORIDE 1 MG/1
1 TABLET ORAL NIGHTLY
Qty: 30 TABLET | Refills: 11 | Status: SHIPPED | OUTPATIENT
Start: 2021-06-30 | End: 2022-07-18 | Stop reason: SDUPTHER

## 2021-06-30 SDOH — ECONOMIC STABILITY: FOOD INSECURITY: WITHIN THE PAST 12 MONTHS, THE FOOD YOU BOUGHT JUST DIDN'T LAST AND YOU DIDN'T HAVE MONEY TO GET MORE.: NEVER TRUE

## 2021-06-30 SDOH — ECONOMIC STABILITY: FOOD INSECURITY: WITHIN THE PAST 12 MONTHS, YOU WORRIED THAT YOUR FOOD WOULD RUN OUT BEFORE YOU GOT MONEY TO BUY MORE.: NEVER TRUE

## 2021-06-30 ASSESSMENT — PATIENT HEALTH QUESTIONNAIRE - PHQ9
SUM OF ALL RESPONSES TO PHQ9 QUESTIONS 1 & 2: 1
2. FEELING DOWN, DEPRESSED OR HOPELESS: 0
SUM OF ALL RESPONSES TO PHQ QUESTIONS 1-9: 1
1. LITTLE INTEREST OR PLEASURE IN DOING THINGS: 1
SUM OF ALL RESPONSES TO PHQ QUESTIONS 1-9: 1
SUM OF ALL RESPONSES TO PHQ QUESTIONS 1-9: 1

## 2021-06-30 ASSESSMENT — SOCIAL DETERMINANTS OF HEALTH (SDOH): HOW HARD IS IT FOR YOU TO PAY FOR THE VERY BASICS LIKE FOOD, HOUSING, MEDICAL CARE, AND HEATING?: NOT VERY HARD

## 2021-06-30 NOTE — PROGRESS NOTES
HPI Notes    Name: Noemi Pearl  : 1940        Chief Complaint:     Chief Complaint   Patient presents with    Leg Pain     RLS    Arthritis     BL hand pain, worse on right. Tries tylenol with little relief. Has tried pain gel with little relief    Headache     questions right side head pain. Has followed with eye .       History of Present Illness:     Noemi Pearl is a [de-identified] y.o.  female who presents with Leg Pain (RLS), Arthritis (BL hand pain, worse on right. Tries tylenol with little relief. Has tried pain gel with little relief), and Headache (questions right side head pain. Has followed with eye )      HPI  Restless leg -  Chronic but stable. Pt is busy all day long and no leg pain. But at night when she rest then the legs really do bother her. The Mirapex helps and for now would like to keep current dose. No swelling or injury. Arthritis - chronic but stable. Pt has had both knees replaced. Pt states she is having more arthritis in her hands. Pt keeps busy. She works the [employer] and has a big garden. Pt has tried tylenol and pain gels that only help a little. Pt still remains active. Headaches - pt has had occ HA since she got new glasses. Pt has no daily HA. No vision changes. No F/C. Pt went back and had her glasses adjusted and since then her HAs have been less. Dizziness - pt has some occ dizziness in Past. But for most part pt can get up and down well. Pt stays pretty busy. Pt also had some changes in her eye on last exam in May. BP stable. No DM and cholesterol ck's about 16mos ago and WNL.       Past Medical History:     Past Medical History:   Diagnosis Date    Urinary incontinence     bladder dropped      Reviewed all health maintenance requirements and ordered appropriate tests  Health Maintenance Due   Topic Date Due    DTaP/Tdap/Td vaccine (1 - Tdap) Never done    Shingles Vaccine (1 of 2) Never done    DEXA (modify frequency per FRAX score)  Never done    Right lower leg: No edema. Left lower leg: No edema. Lymphadenopathy:      Cervical: No cervical adenopathy. Skin:     Findings: No erythema or rash. Neurological:      General: No focal deficit present. Mental Status: She is alert. Mental status is at baseline. Psychiatric:         Mood and Affect: Mood normal.         Behavior: Behavior normal.         Vitals:  /68   Pulse 84   Ht 5' 1\" (1.549 m)   Wt 175 lb (79.4 kg)   SpO2 96%   BMI 33.07 kg/m²       Data:     Lab Results   Component Value Date     02/27/2020    K 4.3 02/27/2020     02/27/2020    CO2 24 02/27/2020    BUN 13 02/27/2020    CREATININE 0.62 02/27/2020    GLUCOSE 107 02/27/2020    GLUCOSE 96 03/14/2012    PROT 7.1 02/27/2020    LABALBU 4.8 02/27/2020    LABALBU 4.2 03/14/2012    BILITOT 0.32 02/27/2020    ALKPHOS 70 02/27/2020    AST 17 02/27/2020    ALT 12 02/27/2020     Lab Results   Component Value Date    WBC 5.9 02/27/2020    RBC 4.73 02/27/2020    RBC 3.78 04/05/2012    HGB 13.6 02/27/2020    HCT 40.8 02/27/2020    MCV 86.1 02/27/2020    MCH 28.8 02/27/2020    MCHC 33.5 02/27/2020    RDW 13.8 02/27/2020     02/27/2020     04/05/2012    MPV NOT REPORTED 02/27/2020     Lab Results   Component Value Date    TSH 1.77 02/27/2020     Lab Results   Component Value Date    CHOL 188 02/27/2020    CHOL 205 05/17/2018    HDL 64 02/27/2020          Assessment/Plan:        1. Restless leg syndrome  Stable on mirapex     2. Arthritis  D/w pt trying voltaren gel and heat     3. Other headache syndrome  Improved since glasses adjusted. But will monitor     4. Dizziness  Only occurs occ but suggest pt ck a carotid doppler  BP is WNL, no diabetes and cholesterol is WNL  - VL DUP CAROTID BILATERAL; Future      Return in about 1 year (around 6/30/2022), or if symptoms worsen or fail to improve.       Electronically signed by Haris Rivera MD on 6/30/2021 at 9:36 PM

## 2021-06-30 NOTE — PATIENT INSTRUCTIONS
Survey: You may be receiving a survey from [employer] regarding your visit today. You may get this in the mail, through your MyChart or in your email. Please complete the survey to enable us to provide the highest quality of care to you and your family. Please also, mention our names. If you cannot score us as very good (5 Stars) on any question, please feel free to call the office to discuss how we could have made your experience exceptional.      Thank You!         MD Christiano Nogueira LPN

## 2021-07-28 ENCOUNTER — HOSPITAL ENCOUNTER (OUTPATIENT)
Dept: VASCULAR LAB | Age: 81
Discharge: HOME | End: 2021-07-30
Payer: MEDICARE

## 2021-07-28 DIAGNOSIS — R42 DIZZINESS: ICD-10-CM

## 2021-07-28 PROCEDURE — 93880 EXTRACRANIAL BILAT STUDY: CPT

## 2021-07-29 DIAGNOSIS — R42 DIZZINESS: Primary | ICD-10-CM

## 2021-07-29 DIAGNOSIS — I65.23 CAROTID STENOSIS, BILATERAL: ICD-10-CM

## 2022-07-18 RX ORDER — PRAMIPEXOLE DIHYDROCHLORIDE 1 MG/1
1 TABLET ORAL NIGHTLY
Qty: 30 TABLET | Refills: 11 | Status: SHIPPED | OUTPATIENT
Start: 2022-07-18

## 2022-07-18 NOTE — TELEPHONE ENCOUNTER
Patient is asking for a refill on Pramipexole 1 mg. Patient last seen 6/30/21. No future appt found. Health Maintenance   Topic Date Due    DTaP/Tdap/Td vaccine (1 - Tdap) Never done    Shingles vaccine (1 of 2) Never done    DEXA (modify frequency per FRAX score)  Never done    Pneumococcal 65+ years Vaccine (2 - PPSV23 or PCV20) 05/04/2021    COVID-19 Vaccine (3 - Booster for Moderna series) 07/23/2021    Annual Wellness Visit (AWV)  12/17/2021    Depression Screen  06/30/2022    Flu vaccine (1) 09/01/2022    Hepatitis A vaccine  Aged Out    Hepatitis B vaccine  Aged Out    Hib vaccine  Aged Out    Meningococcal (ACWY) vaccine  Aged Out             (applicable per patient's age: Cancer Screenings, Depression Screening, Fall Risk Screening, Immunizations)    LDL Cholesterol (mg/dL)   Date Value   02/27/2020 89     LDL Calculated (mg/dL)   Date Value   05/17/2018 123     AST (U/L)   Date Value   02/27/2020 17     ALT (U/L)   Date Value   02/27/2020 12     BUN (mg/dL)   Date Value   02/27/2020 13      (goal A1C is < 7)   (goal LDL is <100) need 30-50% reduction from baseline     BP Readings from Last 3 Encounters:   06/30/21 134/68   12/16/20 128/70   09/18/20 (!) 148/74    (goal /80)      All Future Testing planned in [facility]:  Lab Frequency Next Occurrence       Next Visit Date:  No future appointments.          Patient Active Problem List:     Arthritis     Vitamin D deficiency     Primary osteoarthritis of right knee     Family history of ischemic heart disease and other diseases of the circulatory system

## 2022-07-18 NOTE — TELEPHONE ENCOUNTER
Last OV: 6/30/2021 chronic  Last RX:    Next scheduled apt: Visit date not found            Pt requesting a refill No

## 2022-07-19 RX ORDER — PRAMIPEXOLE DIHYDROCHLORIDE 1 MG/1
1 TABLET ORAL NIGHTLY
Qty: 30 TABLET | Refills: 11 | OUTPATIENT
Start: 2022-07-19

## 2022-07-27 ENCOUNTER — OFFICE VISIT (OUTPATIENT)
Dept: FAMILY MEDICINE CLINIC | Age: 82
End: 2022-07-27
Payer: MEDICARE

## 2022-07-27 VITALS
BODY MASS INDEX: 31.47 KG/M2 | HEART RATE: 82 BPM | SYSTOLIC BLOOD PRESSURE: 138 MMHG | OXYGEN SATURATION: 94 % | DIASTOLIC BLOOD PRESSURE: 60 MMHG | HEIGHT: 62 IN | WEIGHT: 171 LBS

## 2022-07-27 DIAGNOSIS — M19.90 ARTHRITIS: ICD-10-CM

## 2022-07-27 DIAGNOSIS — Z00.00 MEDICARE ANNUAL WELLNESS VISIT, SUBSEQUENT: Primary | ICD-10-CM

## 2022-07-27 DIAGNOSIS — G25.81 RESTLESS LEG SYNDROME: ICD-10-CM

## 2022-07-27 PROCEDURE — G8417 CALC BMI ABV UP PARAM F/U: HCPCS | Performed by: FAMILY MEDICINE

## 2022-07-27 PROCEDURE — 99213 OFFICE O/P EST LOW 20 MIN: CPT | Performed by: FAMILY MEDICINE

## 2022-07-27 PROCEDURE — G8400 PT W/DXA NO RESULTS DOC: HCPCS | Performed by: FAMILY MEDICINE

## 2022-07-27 PROCEDURE — G0439 PPPS, SUBSEQ VISIT: HCPCS | Performed by: FAMILY MEDICINE

## 2022-07-27 PROCEDURE — 1090F PRES/ABSN URINE INCON ASSESS: CPT | Performed by: FAMILY MEDICINE

## 2022-07-27 PROCEDURE — 1036F TOBACCO NON-USER: CPT | Performed by: FAMILY MEDICINE

## 2022-07-27 PROCEDURE — G8427 DOCREV CUR MEDS BY ELIG CLIN: HCPCS | Performed by: FAMILY MEDICINE

## 2022-07-27 PROCEDURE — 1123F ACP DISCUSS/DSCN MKR DOCD: CPT | Performed by: FAMILY MEDICINE

## 2022-07-27 SDOH — ECONOMIC STABILITY: FOOD INSECURITY: WITHIN THE PAST 12 MONTHS, THE FOOD YOU BOUGHT JUST DIDN'T LAST AND YOU DIDN'T HAVE MONEY TO GET MORE.: NEVER TRUE

## 2022-07-27 SDOH — ECONOMIC STABILITY: FOOD INSECURITY: WITHIN THE PAST 12 MONTHS, YOU WORRIED THAT YOUR FOOD WOULD RUN OUT BEFORE YOU GOT MONEY TO BUY MORE.: NEVER TRUE

## 2022-07-27 ASSESSMENT — LIFESTYLE VARIABLES
HOW MANY STANDARD DRINKS CONTAINING ALCOHOL DO YOU HAVE ON A TYPICAL DAY: PATIENT DOES NOT DRINK
HOW OFTEN DO YOU HAVE A DRINK CONTAINING ALCOHOL: NEVER

## 2022-07-27 ASSESSMENT — ENCOUNTER SYMPTOMS
COUGH: 0
DIARRHEA: 0
ABDOMINAL PAIN: 0
VOMITING: 0
EYE REDNESS: 0
SHORTNESS OF BREATH: 0
CONSTIPATION: 0
EYE DISCHARGE: 0
BLOOD IN STOOL: 0
NAUSEA: 0

## 2022-07-27 ASSESSMENT — SOCIAL DETERMINANTS OF HEALTH (SDOH): HOW HARD IS IT FOR YOU TO PAY FOR THE VERY BASICS LIKE FOOD, HOUSING, MEDICAL CARE, AND HEATING?: NOT VERY HARD

## 2022-07-27 NOTE — PROGRESS NOTES
HPI Notes    Name: Ricco Horton  : 1940        Chief Complaint:     Chief Complaint   Patient presents with    Medicare AWV    Arthritis    Leg Pain     RLS, treating on mirapex and tylenol with little relief. History of Present Illness:     Ricco Horton is a 80 y.o.  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 Pt takes Tylenol as needed. Leg Pain   There was no injury mechanism. Pain location: both lower legs but more so the Rt lower leg --- jerks more at night than the Lt. The quality of the pain is described as aching. The pain is mild. Pain course: usually every night but some nights worse than others depends on what she does during the day --- how busy she is that day. Pertinent negatives include no inability to bear weight, muscle weakness, numbness or tingling. Exacerbated by: rest at night --- legs get restless. She has tried heat (stretching, chiropractor and mirapex) for the symptoms.    Past Medical History:     Past Medical History:   Diagnosis Date    Urinary incontinence     bladder dropped      Reviewed all health maintenance requirements and ordered appropriate tests  Health Maintenance Due   Topic Date Due    DTaP/Tdap/Td vaccine (1 - Tdap) Never done    Shingles vaccine (1 of 2) Never done    DEXA (modify frequency per FRAX score)  Never done    Pneumococcal 65+ years Vaccine (2 - PPSV23 or PCV20) 2021    COVID-19 Vaccine (4 - Booster for Moderna series) 2022       Past Surgical History:     Past Surgical History:   Procedure Laterality Date    APPENDECTOMY      CARPAL TUNNEL RELEASE  1963    HYSTERECTOMY (CERVIX STATUS UNKNOWN)      KNEE ARTHROSCOPY      Bilat        Medications:       Prior to Admission medications Medication Sig Start Date End Date Taking? Authorizing Provider   pramipexole (MIRAPEX) 1 MG tablet Take 1 tablet by mouth nightly 7/18/22   Sarah Sanchez MD   Acetaminophen (TYLENOL) 325 MG CAPS Take by mouth daily as needed    Historical Provider, MD        Allergies:       Macrobid [nitrofurantoin monohydrate macrocrystals] and Nitrofurantoin    Social History:     Tobacco:    reports that she has never smoked. She has never used smokeless tobacco.  Alcohol:      reports no history of alcohol use. Drug Use:  reports no history of drug use. Family History:     Family History   Problem Relation Age of Onset    Heart Attack Mother     Heart Attack Brother 72        9/8/11    Other Sister         Brain aneurysm    Cancer Maternal Aunt         Breast       Review of Systems:       Review of Systems   Constitutional:  Negative for chills, fatigue, fever and unexpected weight change. Eyes:  Negative for discharge, redness and visual disturbance. Respiratory:  Negative for cough and shortness of breath. Cardiovascular:  Negative for chest pain and palpitations. Gastrointestinal:  Negative for abdominal pain, blood in stool, constipation, diarrhea, nausea and vomiting. Genitourinary:  Negative for dysuria and hematuria. Musculoskeletal:  Positive for arthralgias. Negative for joint swelling and neck pain. Restless legs   Skin:  Negative for rash. Neurological:  Negative for dizziness, tingling, light-headedness, numbness and headaches. Psychiatric/Behavioral:  Negative for sleep disturbance. Physical Exam:     Physical Exam  Vitals reviewed. Constitutional:       General: She is not in acute distress. Appearance: Normal appearance. She is well-developed. She is not ill-appearing. HENT:      Head: Normocephalic and atraumatic. Eyes:      General:         Right eye: No discharge. Left eye: No discharge.       Conjunctiva/sclera: Conjunctivae normal.   Neck: Thyroid: No thyromegaly. Vascular: No carotid bruit. Cardiovascular:      Rate and Rhythm: Normal rate and regular rhythm. Heart sounds: Normal heart sounds. No murmur heard. Pulmonary:      Effort: Pulmonary effort is normal. No respiratory distress. Breath sounds: Normal breath sounds. Abdominal:      General: There is no distension. Palpations: Abdomen is soft. Tenderness: There is no abdominal tenderness. Musculoskeletal:      Cervical back: Neck supple. Right lower leg: No edema. Left lower leg: No edema. Lymphadenopathy:      Cervical: No cervical adenopathy. Skin:     Findings: No erythema or rash. Neurological:      General: No focal deficit present. Mental Status: She is alert and oriented to person, place, and time.    Psychiatric:         Mood and Affect: Mood normal.         Behavior: Behavior normal.       Vitals:  /60 (Site: Right Upper Arm, Position: Sitting, Cuff Size: Medium Adult)   Pulse 82   Ht 5' 2\" (1.575 m)   Wt 171 lb (77.6 kg)   SpO2 94%   BMI 31.28 kg/m²       Data:     Lab Results   Component Value Date/Time     02/27/2020 08:26 AM    K 4.3 02/27/2020 08:26 AM     02/27/2020 08:26 AM    CO2 24 02/27/2020 08:26 AM    BUN 13 02/27/2020 08:26 AM    CREATININE 0.62 02/27/2020 08:26 AM    GLUCOSE 107 02/27/2020 08:26 AM    GLUCOSE 96 03/14/2012 08:59 AM    PROT 7.1 02/27/2020 08:26 AM    LABALBU 4.8 02/27/2020 08:26 AM    LABALBU 4.2 03/14/2012 08:59 AM    BILITOT 0.32 02/27/2020 08:26 AM    ALKPHOS 70 02/27/2020 08:26 AM    AST 17 02/27/2020 08:26 AM    ALT 12 02/27/2020 08:26 AM     Lab Results   Component Value Date/Time    WBC 5.9 02/27/2020 08:26 AM    RBC 4.73 02/27/2020 08:26 AM    RBC 3.78 04/05/2012 08:00 AM    HGB 13.6 02/27/2020 08:26 AM    HCT 40.8 02/27/2020 08:26 AM    MCV 86.1 02/27/2020 08:26 AM    MCH 28.8 02/27/2020 08:26 AM    MCHC 33.5 02/27/2020 08:26 AM    RDW 13.8 02/27/2020 08:26 AM     02/27/2020 08:26 AM     04/05/2012 08:00 AM    MPV NOT REPORTED 02/27/2020 08:26 AM     Lab Results   Component Value Date/Time    TSH 1.77 02/27/2020 08:26 AM     Lab Results   Component Value Date/Time    CHOL 188 02/27/2020 08:26 AM    CHOL 205 05/17/2018 12:00 AM    HDL 64 02/27/2020 08:26 AM          Assessment/Plan:        1. Arthritis  Stable and takes tylenol PRN but stays busy     2. Restless leg syndrome  Stable for now on mirapex and will stay with current dose. 3. Medicare annual wellness visit, subsequent  Completed       Abdulkadird Null received counseling on the following healthy behaviors: nutrition and exercise  Reviewed prior labs and health maintenance  Continue current medications, diet and exercise. Discussed use, benefit, and side effects of prescribed medications. Barriers to medication compliance addressed. Patient given educational materials - see patient instructions  Was a self-tracking handout given in paper form or via mywavest? Yes    Requested Prescriptions      No prescriptions requested or ordered in this encounter       All patient questions answered. Patient voiced understanding. Quality Measures    Body mass index is 31.28 kg/m². Elevated. Weight control planned discussed Healthy diet and regular exercise. BP: 138/60. Blood pressure is Normal. Treatment plan consists of No treatment change needed. Fall Risk 7/27/2022 12/16/2020 10/25/2019 11/1/2018 6/4/2018 5/15/2017 4/21/2016   2 or more falls in past year? no no no no - no no   Fall with injury in past year? no no no no no no no     The patient does not have a history of falls. I did not - not indicated , complete a risk assessment for falls.  A plan of care for falls No Treatment plan indicated    Lab Results   Component Value Date    LDLCALC 123 05/17/2018    LDLCHOLESTEROL 89 02/27/2020    (goal LDL reduction with dx if diabetes is 50% LDL reduction)    PHQ Scores 7/27/2022 6/30/2021 12/16/2020 10/25/2019 5/8/2019 6/4/2018 5/15/2017   PHQ2 Score 0 1 0 0 0 0 0   PHQ9 Score 0 1 0 0 0 0 0     Interpretation of Total Score Depression Severity: 1-4 = Minimal depression, 5-9 = Mild depression, 10-14 = Moderate depression, 15-19 = Moderately severe depression, 20-27 = Severe depression      Return for Medicare Annual Wellness Visit in 1 year.       Electronically signed by Elsie Snellen, MD on 7/27/2022 at 5:39 PM

## 2022-07-27 NOTE — PATIENT INSTRUCTIONS
Survey: You may be receiving a survey from [employer] regarding your visit today. You may get this in the mail, through your MyChart or in your email. Please complete the survey to enable us to provide the highest quality of care to you and your family. Please also, mention our names. If you cannot score us as very good (5 Stars) on any question, please feel free to call the office to discuss how we could have made your experience exceptional.      Thank You! Dr. Shikha Elizondo, [address] [place] [address], [facility]   Personalized Preventive Plan for [place] - 7/27/2022  Medicare offers a range of preventive health benefits. Some of the tests and screenings are paid in full while other may be subject to a deductible, co-insurance, and/or copay. Some of these benefits include a comprehensive review of your medical history including lifestyle, illnesses that may run in your family, and various assessments and screenings as appropriate. After reviewing your medical record and screening and assessments performed today your provider may have ordered immunizations, labs, imaging, and/or referrals for you. A list of these orders (if applicable) as well as your Preventive Care list are included within your After Visit Summary for your review. Other Preventive Recommendations:    A preventive eye exam performed by an eye specialist is recommended every 1-2 years to screen for glaucoma; cataracts, macular degeneration, and other eye disorders. A preventive dental visit is recommended every 6 months. Try to get at least 150 minutes of exercise per week or 10,000 steps per day on a pedometer . Order or download the FREE \"Exercise & Physical Activity: Your Everyday Guide\" from [employer]. Call 5-873.895.8346 or search [employer] online. You need 5957-3951 mg of calcium and 0183-7818 IU of vitamin D per day.  It is possible to meet your calcium requirement with diet alone, but a vitamin D supplement is usually necessary to meet this goal.  When exposed to the sun, use a sunscreen that protects against both UVA and UVB radiation with an SPF of 30 or greater. Reapply every 2 to 3 hours or after sweating, drying off with a towel, or swimming. Always wear a seat belt when traveling in a car. Always wear a helmet when riding a bicycle or motorcycle.

## 2022-07-27 NOTE — PROGRESS NOTES
Medicare Annual Wellness Visit    Blaise Mckenzie is here for Medicare AWV, Arthritis, and Leg Pain (RLS, treating on mirapex and tylenol with little relief.)    Assessment & Plan   Arthritis  Restless leg syndrome    Recommendations for Preventive Services Due: see orders and patient instructions/AVS.  Recommended screening schedule for the next 5-10 years is provided to the patient in written form: see Patient Instructions/AVS.     No follow-ups on file. Subjective   The following acute and/or chronic problems were also addressed today:    Patient's complete Health Risk Assessment and screening values have been reviewed and are found in Flowsheets. The following problems were reviewed today and where indicated follow up appointments were made and/or referrals ordered.     Positive Risk Factor Screenings with Interventions:    Fall Risk:  Do you feel unsteady or are you worried about falling? : (!) yes  2 or more falls in past year?: no  Fall with injury in past year?: no   Fall Risk Interventions:    Patient declines any further evaluation/treatment for this issue            General Health and ACP:  General  In general, how would you say your health is?: Good  In the past 7 days, have you experienced any of the following: New or Increased Pain, New or Increased Fatigue, Loneliness, Social Isolation, Stress or Anger?: No  Do you get the social and emotional support that you need?: Yes  Do you have a Living Will?: Yes    Advance Directives       Power of  Living Will ACP-Advance Directive ACP-Power of     Not on File Not on File Not on File Not on File        General Health Risk Interventions:  Pt keeps busy working    Health Habits/Nutrition:  Physical Activity: Inactive    Days of Exercise per Week: 0 days    Minutes of Exercise per Session: 0 min     Have you lost any weight without trying in the past 3 months?: No  Body mass index: (!) 31.27  Have you seen the dentist within the past year?:

## 2022-12-21 NOTE — TELEPHONE ENCOUNTER
mirapex 1 mg    DM-lai    Check up scheduled 6/30/21. Please send in 1 month supply for the patient.     Health Maintenance   Topic Date Due    COVID-19 Vaccine (1) Never done    DTaP/Tdap/Td vaccine (1 - Tdap) Never done    Shingles Vaccine (1 of 2) Never done    DEXA (modify frequency per FRAX score)  Never done    Pneumococcal 65+ years Vaccine (2 of 2 - PPSV23) 05/04/2021    Annual Wellness Visit (AWV)  12/17/2021    Flu vaccine  Completed    Hepatitis A vaccine  Aged Out    Hepatitis B vaccine  Aged Out    Hib vaccine  Aged Out    Meningococcal (ACWY) vaccine  Aged Out             (applicable per patient's age: Cancer Screenings, Depression Screening, Fall Risk Screening, Immunizations)    LDL Cholesterol (mg/dL)   Date Value   02/27/2020 89     LDL Calculated (mg/dL)   Date Value   05/17/2018 123     AST (U/L)   Date Value   02/27/2020 17     ALT (U/L)   Date Value   02/27/2020 12     BUN (mg/dL)   Date Value   02/27/2020 13      (goal A1C is < 7)   (goal LDL is <100) need 30-50% reduction from baseline     BP Readings from Last 3 Encounters:   12/16/20 128/70   09/18/20 (!) 148/74   02/28/20 (!) 148/60    (goal /80)      All Future Testing planned in [facility]:  Lab Frequency Next Occurrence       Next Visit Date:  Future Appointments   Date Time Provider Ryan Jacob   6/30/2021  2:40 PM [facility]            Patient Active Problem List:     Arthritis     Vitamin D deficiency     Primary osteoarthritis of right knee     Family history of ischemic heart disease and other diseases of the circulatory system         Patient chart reviewed, full consult to follow.     Thank you for the courtesy of this consultation. [facility], [facility]  NEPHROLOGY AND HYPERTENSION  [address]  [address]  [place] [address]  [phone]    MD YECENIA CRAVEN MD YELENA ROSENBERG, MD BINNY KOSHY, MD CHRISTOPHER CAPUTO, MD EDWARD BOVER, MD      Information from chart:  "Patient is a 73y old  Female who presents with a chief complaint of amadou (21 Dec 2022 15:02)    HPI:   73 y.o. female w/ PMHx dementia (nonverbal) , CVA w/ R. sided weakness, HTN, hx seizures, and Gout who presents with worsening swelling. patient cannot give any history. collateral obtained from daughter/HCP:  Gali (PH: [phone]). Today patient's LE swelling was getting worse, her home NP told her to increase her "water pill" but on review, lasix was not part of the medical records. One year ago, lasix was decreased to half a pill, daughter unsure of when lasix was stopped. Swelling has been increasing for 2-3 days. Last creatinine was 1.82 in 2022. Mayberry removed over the summer (inserted in ). has not had any urinary changes per daughter. Denies fevers, chills, nausea, vomiting, constipation, diarrhea. Baseline is that patietn is interactive, often forgetful, but will eat soft foods, but non-verbal.     In the ED, patient was found to be BUN/Cr 6.05, BNP to 5042, admitted for further workup. Unable to obtain LE dopplers given increased agitation.  (20 Dec 2022 22:14)   "      No distress        PAST MEDICAL & SURGICAL HISTORY:  Hypertension      Diabetes      Asthma      OA (osteoarthritis)  bautista knees, low back  and  bautista shoulders      Gout      Seizure disorder      Urinary incontinence      Vision changes  lt eye      CVA (cerebral infarction)  right side weakness      Kidney stone        FAMILY HISTORY:  No pertinent family history in first degree relatives      Allergies    No Known Allergies    Intolerances      Home Medications:  amLODIPine 10 mg oral tablet: 1 tab(s) orally once a day (20 Dec 2022 21:31)  aspirin 81 mg oral delayed release tablet: 1 tab(s) orally once a day (20 Dec 2022 21:31)  cloNIDine 0.1 mg oral tablet: 1 tab(s) orally every 8 hours (20 Dec 2022 21:31)  hydrALAZINE 25 mg oral tablet: 1 tab(s) orally 3 times a day (20 Dec 2022 21:31)  Keppra 500 mg oral tablet: 1 tab(s) orally 2 times a day (20 Dec 2022 21:31)  metoprolol tartrate 50 mg oral tablet: 1 tab(s) orally 2 times a day (20 Dec 2022 21:31)  simvastatin 20 mg oral tablet: 1 tab(s) orally once a day (at bedtime) (20 Dec 2022 21:31)    MEDICATIONS  (STANDING):  amLODIPine   Tablet 10 milliGRAM(s) Oral daily  aspirin  chewable 81 milliGRAM(s) Oral daily  cefTRIAXone   IVPB 1000 milliGRAM(s) IV Intermittent every 24 hours  cloNIDine 0.1 milliGRAM(s) Oral every 8 hours  cloNIDine Patch 0.3 mG/24Hr(s) 1 patch Transdermal every 7 days  heparin  Infusion.  Unit(s)/Hr (11 mL/Hr) IV Continuous <Continuous>  hydrALAZINE 25 milliGRAM(s) Oral every 8 hours  influenza  Vaccine (HIGH DOSE) 0.7 milliLiter(s) IntraMuscular once  levETIRAcetam  IVPB 500 milliGRAM(s) IV Intermittent every 12 hours  polyethylene glycol 3350 17 Gram(s) Oral every 12 hours  senna 2 Tablet(s) Oral at bedtime  simvastatin 20 milliGRAM(s) Oral at bedtime    MEDICATIONS  (PRN):  heparin   Injectable 4500 Unit(s) IV Push every 6 hours PRN For aPTT less than 40  heparin   Injectable 2000 Unit(s) IV Push every 6 hours PRN For aPTT between 40 - 57  melatonin 3 milliGRAM(s) Oral at bedtime PRN Insomnia  metoprolol tartrate Injectable 5 milliGRAM(s) IV Push every 6 hours PRN sbp>160 dbp >110    Vital Signs Last 24 Hrs  T(C): 39.2 (21 Dec 2022 21:03), Max: 39.2 (21 Dec 2022 21:03)  T(F): 102.5 (21 Dec 2022 21:03), Max: 102.5 (21 Dec 2022 21:03)  HR: 126 (21 Dec 2022 21:03) (81 - 126)  BP: 138/88 (21 Dec 2022 21:03) (138/88 - 180/92)  BP(mean): --  RR: 20 (21 Dec 2022 21:03) (15 - 20)  SpO2: 95% (21 Dec 2022 21:03) (95% - 100%)    Parameters below as of 21 Dec 2022 21:03  Patient On (Oxygen Delivery Method): room air        Daily     Daily     CAPILLARY BLOOD GLUCOSE        PHYSICAL EXAM:      T(C): 39.2 (22 @ 21:03), Max: 39.2 (22 @ 21:03)  HR: 126 (22 @ 21:03) (81 - 126)  BP: 138/88 (22 @ 21:03) (138/88 - 180/92)  RR: 20 (22 @ 21:03) (15 - 20)  SpO2: 95% (22 @ 21:03) (95% - 100%)  Wt(kg): --  Lungs clear  Heart S1S2  Abd soft NT ND  Extremities:   2 edema                  143  |  117<H>  |  54<H>  ----------------------------<  129<H>  4.2   |  17<L>  |  6.02<H>    Ca    7.7<L>      21 Dec 2022 03:30  Phos  4.8       Mg     1.8         TPro  7.5  /  Alb  2.6<L>  /  TBili  0.3  /  DBili  x   /  AST  21  /  ALT  11<L>  /  AlkPhos  83                            8.8    7.56  )-----------( 372      ( 21 Dec 2022 17:50 )             27.1     Creatinine Trend: 6.02<--, 6.05<--  Urinalysis Basic - ( 20 Dec 2022 21:00 )    Color: Yellow / Appearance: very cloudy / S.015 / pH: x  Gluc: x / Ketone: Negative  / Bili: Negative / Urobili: Negative mg/dL   Blood: x / Protein: 500 mg/dL / Nitrite: Negative   Leuk Esterase: Moderate / RBC: 0-2 /HPF / WBC >50   Sq Epi: x / Non Sq Epi: Few / Bacteria: Many    Basic Metabolic Panel in AM (22 @ 08:11)    Creatinine, Serum: 1.82 mg/dL    2000 500+ protein   2021 500 + protein       Assessment   AMADOU, CKD 3 ( Cr 1.8 ) HTN, DM and proteinuria   Nephrotic syndrome;   R/O occult GN unrelated to DM, HTN ( FSGS, MGN; MPGN, vasculitic, CTD related)    Plan  Judicious diuretic rx   Quantitate proteinuria   Serologies and paraproteinuria   Will follow.      Luis Enrique Oakes MD

## 2023-07-18 ENCOUNTER — OFFICE VISIT (OUTPATIENT)
Dept: FAMILY MEDICINE CLINIC | Age: 83
End: 2023-07-18
Payer: MEDICARE

## 2023-07-18 VITALS
DIASTOLIC BLOOD PRESSURE: 60 MMHG | OXYGEN SATURATION: 96 % | SYSTOLIC BLOOD PRESSURE: 138 MMHG | HEART RATE: 73 BPM | HEIGHT: 62 IN | WEIGHT: 165.1 LBS | BODY MASS INDEX: 30.38 KG/M2

## 2023-07-18 DIAGNOSIS — G25.81 RESTLESS LEG SYNDROME: Primary | ICD-10-CM

## 2023-07-18 DIAGNOSIS — M67.441 GANGLION CYST OF JOINT OF FINGER OF RIGHT HAND: ICD-10-CM

## 2023-07-18 PROCEDURE — 99213 OFFICE O/P EST LOW 20 MIN: CPT | Performed by: FAMILY MEDICINE

## 2023-07-18 PROCEDURE — G8400 PT W/DXA NO RESULTS DOC: HCPCS | Performed by: FAMILY MEDICINE

## 2023-07-18 PROCEDURE — 1123F ACP DISCUSS/DSCN MKR DOCD: CPT | Performed by: FAMILY MEDICINE

## 2023-07-18 PROCEDURE — G8417 CALC BMI ABV UP PARAM F/U: HCPCS | Performed by: FAMILY MEDICINE

## 2023-07-18 PROCEDURE — 1036F TOBACCO NON-USER: CPT | Performed by: FAMILY MEDICINE

## 2023-07-18 PROCEDURE — 1090F PRES/ABSN URINE INCON ASSESS: CPT | Performed by: FAMILY MEDICINE

## 2023-07-18 PROCEDURE — G8427 DOCREV CUR MEDS BY ELIG CLIN: HCPCS | Performed by: FAMILY MEDICINE

## 2023-07-18 RX ORDER — PRAMIPEXOLE DIHYDROCHLORIDE 1.5 MG/1
1.5 TABLET ORAL NIGHTLY
Qty: 30 TABLET | Refills: 11 | Status: SHIPPED | OUTPATIENT
Start: 2023-07-18

## 2023-07-18 ASSESSMENT — ENCOUNTER SYMPTOMS
EYE DISCHARGE: 0
DIARRHEA: 0
CONSTIPATION: 0
SHORTNESS OF BREATH: 0
BLOOD IN STOOL: 0
EYE REDNESS: 0

## 2023-07-18 NOTE — PROGRESS NOTES
HPI Notes    Name: Jessika Hargrove  : 1940        Chief Complaint:     Chief Complaint   Patient presents with    Restless Leg(s)       History of Present Illness:     Jessika Hargrove is a 80 y.o.  female who presents with Restless Leg(s)      HPI  Restless legs - pt is doing well still. Overall, pt is doing well and no complaints. 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 Pt tries to stay active and watches her     Rt hand mass - pt has a mass at the base of the Rt thumb. No redness or pain. No injury. Occ her thumb goes numb. Past Medical History:     Past Medical History:   Diagnosis Date    Urinary incontinence     bladder dropped      Reviewed all health maintenance requirements and ordered appropriate tests  Health Maintenance Due   Topic Date Due    DTaP/Tdap/Td vaccine (1 - Tdap) Never done    Shingles vaccine (1 of 2) Never done    DEXA (modify frequency per FRAX score)  Never done    Pneumococcal 65+ years Vaccine (2 - PPSV23 if available, else PCV20) 2021    Depression Screen  2023    Annual Wellness Visit (AWV)  2023       Past Surgical History:     Past Surgical History:   Procedure Laterality Date    APPENDECTOMY      CARPAL TUNNEL RELEASE  1963    HYSTERECTOMY (CERVIX STATUS UNKNOWN)      KNEE ARTHROSCOPY      Bilat        Medications:       Prior to Admission medications    Medication Sig Start Date End Date Taking? Authorizing Provider   pramipexole (MIRAPEX) 1.5 MG tablet Take 1 tablet by mouth nightly 23  Yes Christi Molina MD   Acetaminophen 325 MG CAPS Take by mouth daily as needed   Yes Historical Provider, MD        Allergies:       Macrobid [nitrofurantoin monohydrate macrocrystals] and Nitrofurantoin    Social History:     Tobacco:    reports that she has never smoked. She has never used smokeless tobacco.  Alcohol:      reports no history of alcohol use. Drug Use:  reports no history of drug use.     Family History:     Family History

## 2023-09-10 ENCOUNTER — HOSPITAL ENCOUNTER (EMERGENCY)
Age: 83
Discharge: HOME | End: 2023-09-10
Attending: EMERGENCY MEDICINE
Payer: MEDICARE

## 2023-09-10 VITALS
OXYGEN SATURATION: 96 % | SYSTOLIC BLOOD PRESSURE: 160 MMHG | HEART RATE: 74 BPM | HEIGHT: 62 IN | TEMPERATURE: 98.2 F | WEIGHT: 158 LBS | DIASTOLIC BLOOD PRESSURE: 90 MMHG | RESPIRATION RATE: 18 BRPM | BODY MASS INDEX: 29.08 KG/M2

## 2023-09-10 DIAGNOSIS — T63.444A BEE STING, UNDETERMINED INTENT, INITIAL ENCOUNTER: Primary | ICD-10-CM

## 2023-09-10 PROCEDURE — 99283 EMERGENCY DEPT VISIT LOW MDM: CPT

## 2023-09-10 RX ORDER — HYDROXYZINE PAMOATE 25 MG/1
25 CAPSULE ORAL 3 TIMES DAILY PRN
Qty: 9 CAPSULE | Refills: 0 | Status: SHIPPED | OUTPATIENT
Start: 2023-09-10 | End: 2023-09-13

## 2023-09-10 RX ORDER — PREDNISONE 20 MG/1
TABLET ORAL
Qty: 6 TABLET | Refills: 0 | Status: SHIPPED | OUTPATIENT
Start: 2023-09-10

## 2023-09-10 ASSESSMENT — PAIN - FUNCTIONAL ASSESSMENT: PAIN_FUNCTIONAL_ASSESSMENT: NONE - DENIES PAIN

## 2023-09-10 ASSESSMENT — LIFESTYLE VARIABLES
HOW OFTEN DO YOU HAVE A DRINK CONTAINING ALCOHOL: NEVER
HOW MANY STANDARD DRINKS CONTAINING ALCOHOL DO YOU HAVE ON A TYPICAL DAY: PATIENT DOES NOT DRINK

## 2024-06-27 ENCOUNTER — HOSPITAL ENCOUNTER (OUTPATIENT)
Dept: ULTRASOUND IMAGING | Age: 84
Discharge: HOME | End: 2024-06-29
Attending: STUDENT IN AN ORGANIZED HEALTH CARE EDUCATION/TRAINING PROGRAM
Payer: MEDICARE

## 2024-06-27 ENCOUNTER — OFFICE VISIT (OUTPATIENT)
Dept: FAMILY MEDICINE CLINIC | Age: 84
End: 2024-06-27

## 2024-06-27 ENCOUNTER — HOSPITAL ENCOUNTER (OUTPATIENT)
Age: 84
Setting detail: SPECIMEN
Discharge: HOME | End: 2024-06-27
Payer: MEDICARE

## 2024-06-27 VITALS
SYSTOLIC BLOOD PRESSURE: 130 MMHG | OXYGEN SATURATION: 96 % | DIASTOLIC BLOOD PRESSURE: 70 MMHG | WEIGHT: 157 LBS | HEART RATE: 75 BPM | BODY MASS INDEX: 28.72 KG/M2

## 2024-06-27 DIAGNOSIS — R35.0 FREQUENT URINATION: ICD-10-CM

## 2024-06-27 DIAGNOSIS — R39.11 URINARY HESITANCY: ICD-10-CM

## 2024-06-27 DIAGNOSIS — R82.90 MALODOROUS URINE: ICD-10-CM

## 2024-06-27 DIAGNOSIS — R10.2 SUPRAPUBIC PAIN: ICD-10-CM

## 2024-06-27 DIAGNOSIS — R10.2 SUPRAPUBIC PAIN: Primary | ICD-10-CM

## 2024-06-27 LAB
BILIRUBIN, POC: NEGATIVE
BLOOD URINE, POC: NEGATIVE
CLARITY, POC: CLEAR
COLOR, POC: YELLOW
GLUCOSE URINE, POC: NEGATIVE
KETONES, POC: NEGATIVE
LEUKOCYTE EST, POC: NORMAL
NITRITE, POC: NEGATIVE
PH, POC: 6.5
PROTEIN, POC: NEGATIVE
SPECIFIC GRAVITY, POC: 1.02
UROBILINOGEN, POC: 0.2

## 2024-06-27 PROCEDURE — 87086 URINE CULTURE/COLONY COUNT: CPT

## 2024-06-27 PROCEDURE — 76775 US EXAM ABDO BACK WALL LIM: CPT | Performed by: STUDENT IN AN ORGANIZED HEALTH CARE EDUCATION/TRAINING PROGRAM

## 2024-06-27 SDOH — ECONOMIC STABILITY: HOUSING INSECURITY
IN THE LAST 12 MONTHS, WAS THERE A TIME WHEN YOU DID NOT HAVE A STEADY PLACE TO SLEEP OR SLEPT IN A SHELTER (INCLUDING NOW)?: NO

## 2024-06-27 SDOH — ECONOMIC STABILITY: FOOD INSECURITY: WITHIN THE PAST 12 MONTHS, YOU WORRIED THAT YOUR FOOD WOULD RUN OUT BEFORE YOU GOT MONEY TO BUY MORE.: NEVER TRUE

## 2024-06-27 SDOH — ECONOMIC STABILITY: FOOD INSECURITY: WITHIN THE PAST 12 MONTHS, THE FOOD YOU BOUGHT JUST DIDN'T LAST AND YOU DIDN'T HAVE MONEY TO GET MORE.: NEVER TRUE

## 2024-06-27 SDOH — ECONOMIC STABILITY: INCOME INSECURITY: HOW HARD IS IT FOR YOU TO PAY FOR THE VERY BASICS LIKE FOOD, HOUSING, MEDICAL CARE, AND HEATING?: NOT HARD AT ALL

## 2024-06-27 ASSESSMENT — ENCOUNTER SYMPTOMS
RHINORRHEA: 0
DIARRHEA: 0
WHEEZING: 0
VOMITING: 0
SINUS PAIN: 0
NAUSEA: 0
COUGH: 0
BACK PAIN: 0
ABDOMINAL PAIN: 1

## 2024-06-27 ASSESSMENT — PATIENT HEALTH QUESTIONNAIRE - PHQ9
2. FEELING DOWN, DEPRESSED OR HOPELESS: NOT AT ALL
SUM OF ALL RESPONSES TO PHQ QUESTIONS 1-9: 0
1. LITTLE INTEREST OR PLEASURE IN DOING THINGS: NOT AT ALL
SUM OF ALL RESPONSES TO PHQ9 QUESTIONS 1 & 2: 0
SUM OF ALL RESPONSES TO PHQ QUESTIONS 1-9: 0

## 2024-06-27 NOTE — PATIENT INSTRUCTIONS
SURVEY:    You may be receiving a survey from [employer] regarding your visit today.    You may get this in the mail, through your MyChart or in your email.     Please complete the survey to enable us to provide the highest quality of care to you and your family.    If you cannot score us as very good ( 5 Stars) on any question, please feel free to call the office to discuss how we could have made your experience exceptional.     Thank you.    Clinical Care Team:  Dr. Adalberto Frankel, DO Trudi Brumfield LPN    Triage:  Maru Dunaway CMA    Clerical Team:  Maru Schroeder

## 2024-06-27 NOTE — PROGRESS NOTES
tablet Take 1 tablet by mouth nightly 7/18/23  Yes Lary Liriano MD   Acetaminophen 325 MG CAPS Take by mouth daily as needed   Yes Provider, MD Maris   predniSONE (DELTASONE) 20 MG tablet 2 tablets daily x 3 days  Patient not taking: Reported on 6/27/2024 9/10/23   Jameel Hughes MD        Allergies:       Macrobid [nitrofurantoin monohydrate macrocrystals] and Nitrofurantoin    Social History:     Tobacco:    reports that she has never smoked. She has never used smokeless tobacco.  Alcohol:      reports no history of alcohol use.  Drug Use:  reports no history of drug use.    Family History:     Family History   Problem Relation Age of Onset    Heart Attack Mother     Heart Attack Brother 65        9/8/11    Other Sister         Brain aneurysm    Cancer Maternal Aunt         Breast       Review of Systems:     Review of Systems   Constitutional:  Negative for fever.   HENT:  Negative for rhinorrhea, sinus pain and sneezing.    Respiratory:  Negative for cough and wheezing.    Cardiovascular:  Negative for chest pain.   Gastrointestinal:  Positive for abdominal pain. Negative for diarrhea, nausea and vomiting.   Genitourinary:  Positive for difficulty urinating and frequency. Negative for dysuria, enuresis, flank pain and urgency.   Musculoskeletal:  Negative for back pain.   Skin:  Negative for rash.   Neurological:  Negative for headaches.   Psychiatric/Behavioral:  Negative for sleep disturbance.      Physical Exam:     Vitals:  /70   Pulse 75   Wt 71.2 kg (157 lb)   SpO2 96%   BMI 28.72 kg/m²     Physical Exam  Vitals and nursing note reviewed.   Constitutional:       General: She is not in acute distress.     Appearance: Normal appearance.   Skin:     General: Skin is warm and dry.      Capillary Refill: Capillary refill takes less than 2 seconds.   Neurological:      General: No focal deficit present.      Mental Status: She is alert and oriented to person, place, and time. Mental

## 2024-06-28 ENCOUNTER — TELEPHONE (OUTPATIENT)
Dept: FAMILY MEDICINE CLINIC | Age: 84
End: 2024-06-28

## 2024-06-28 DIAGNOSIS — R33.9 URINARY RETENTION: Primary | ICD-10-CM

## 2024-06-28 LAB
MICROORGANISM SPEC CULT: NORMAL
SERVICE CMNT-IMP: NORMAL
SPECIMEN DESCRIPTION: NORMAL

## 2024-07-03 ENCOUNTER — HOSPITAL ENCOUNTER (OUTPATIENT)
Dept: CT IMAGING | Age: 84
Discharge: HOME | End: 2024-07-05
Attending: STUDENT IN AN ORGANIZED HEALTH CARE EDUCATION/TRAINING PROGRAM
Payer: MEDICARE

## 2024-07-03 DIAGNOSIS — R33.9 URINARY RETENTION: ICD-10-CM

## 2024-07-03 DIAGNOSIS — R33.9 URINARY RETENTION: Primary | ICD-10-CM

## 2024-07-03 LAB
BUN SERPL-MCNC: 16 MG/DL (ref 8–23)
CREAT SERPL-MCNC: 0.7 MG/DL (ref 0.5–0.9)
GFR, ESTIMATED: 85 ML/MIN/1.73M2

## 2024-07-03 PROCEDURE — 36415 COLL VENOUS BLD VENIPUNCTURE: CPT

## 2024-07-03 PROCEDURE — 72193 CT PELVIS W/DYE: CPT

## 2024-07-03 PROCEDURE — 82565 ASSAY OF CREATININE: CPT

## 2024-07-03 PROCEDURE — 84520 ASSAY OF UREA NITROGEN: CPT

## 2024-07-03 PROCEDURE — 6360000004 HC RX CONTRAST MEDICATION: Performed by: STUDENT IN AN ORGANIZED HEALTH CARE EDUCATION/TRAINING PROGRAM

## 2024-07-03 RX ADMIN — IOPAMIDOL 75 ML: 755 INJECTION, SOLUTION INTRAVENOUS at 13:56

## 2024-07-05 ENCOUNTER — TELEPHONE (OUTPATIENT)
Dept: FAMILY MEDICINE CLINIC | Age: 84
End: 2024-07-05

## 2024-07-08 ENCOUNTER — TELEPHONE (OUTPATIENT)
Dept: FAMILY MEDICINE CLINIC | Age: 84
End: 2024-07-08

## 2024-07-08 DIAGNOSIS — R19.00 PELVIC MASS IN FEMALE: Primary | ICD-10-CM

## 2024-07-08 NOTE — TELEPHONE ENCOUNTER
I called Ruth to discuss her CT results. I had attempted to call on 7/5 but was unable to reach her. I again was not able to connect with her and [facility].  Dr. Frankel

## 2024-07-08 NOTE — TELEPHONE ENCOUNTER
I called Ms. Hutton and discussed her results of the CT, recommending outpatient MRI w contrast of the pelvis and referral to Heme/Onc in [place] (Dr. Curiel). Brief note sent to Dr. Liriano, her PCP.

## 2024-07-09 ENCOUNTER — OFFICE VISIT (OUTPATIENT)
Dept: FAMILY MEDICINE CLINIC | Age: 84
End: 2024-07-09
Payer: MEDICARE

## 2024-07-09 VITALS
HEART RATE: 70 BPM | OXYGEN SATURATION: 95 % | HEIGHT: 62 IN | BODY MASS INDEX: 28.89 KG/M2 | SYSTOLIC BLOOD PRESSURE: 136 MMHG | DIASTOLIC BLOOD PRESSURE: 60 MMHG | WEIGHT: 157 LBS

## 2024-07-09 DIAGNOSIS — Z00.00 MEDICARE ANNUAL WELLNESS VISIT, SUBSEQUENT: Primary | ICD-10-CM

## 2024-07-09 DIAGNOSIS — R10.2 SUPRAPUBIC PAIN: ICD-10-CM

## 2024-07-09 DIAGNOSIS — G25.81 RESTLESS LEG SYNDROME: ICD-10-CM

## 2024-07-09 PROCEDURE — 1123F ACP DISCUSS/DSCN MKR DOCD: CPT | Performed by: FAMILY MEDICINE

## 2024-07-09 PROCEDURE — G8400 PT W/DXA NO RESULTS DOC: HCPCS | Performed by: FAMILY MEDICINE

## 2024-07-09 PROCEDURE — 1036F TOBACCO NON-USER: CPT | Performed by: FAMILY MEDICINE

## 2024-07-09 PROCEDURE — 99213 OFFICE O/P EST LOW 20 MIN: CPT | Performed by: FAMILY MEDICINE

## 2024-07-09 PROCEDURE — G8427 DOCREV CUR MEDS BY ELIG CLIN: HCPCS | Performed by: FAMILY MEDICINE

## 2024-07-09 PROCEDURE — G8417 CALC BMI ABV UP PARAM F/U: HCPCS | Performed by: FAMILY MEDICINE

## 2024-07-09 PROCEDURE — G0439 PPPS, SUBSEQ VISIT: HCPCS | Performed by: FAMILY MEDICINE

## 2024-07-09 PROCEDURE — 1090F PRES/ABSN URINE INCON ASSESS: CPT | Performed by: FAMILY MEDICINE

## 2024-07-09 RX ORDER — PRAMIPEXOLE DIHYDROCHLORIDE 1.5 MG/1
1.5 TABLET ORAL NIGHTLY
Qty: 30 TABLET | Refills: 11 | Status: SHIPPED | OUTPATIENT
Start: 2024-07-09

## 2024-07-09 ASSESSMENT — ENCOUNTER SYMPTOMS
CONSTIPATION: 0
BLOOD IN STOOL: 0
ABDOMINAL PAIN: 1
FACIAL SWELLING: 0
COUGH: 0
VOMITING: 0
SHORTNESS OF BREATH: 0
NAUSEA: 0
EYE DISCHARGE: 0
DIARRHEA: 0
BELCHING: 0
EYE REDNESS: 0
HEMATOCHEZIA: 0
FLATUS: 0

## 2024-07-09 ASSESSMENT — PATIENT HEALTH QUESTIONNAIRE - PHQ9
SUM OF ALL RESPONSES TO PHQ QUESTIONS 1-9: 0
SUM OF ALL RESPONSES TO PHQ9 QUESTIONS 1 & 2: 0
2. FEELING DOWN, DEPRESSED OR HOPELESS: NOT AT ALL
1. LITTLE INTEREST OR PLEASURE IN DOING THINGS: NOT AT ALL
SUM OF ALL RESPONSES TO PHQ QUESTIONS 1-9: 0

## 2024-07-09 NOTE — PROGRESS NOTES
Medicare Annual Wellness Visit    Ruth VASQUEZ Shade is here for Medicare AWV (Patient here today for AWV), Leg Pain (Restless leg syndrome), and Abdominal Pain (Patient complains of lower abdomen pain.)    Assessment & Plan   Medicare annual wellness visit, subsequent  Restless leg syndrome  Suprapubic pain    Recommendations for Preventive Services Due: see orders and patient instructions/AVS.  Recommended screening schedule for the next 5-10 years is provided to the patient in written form: see Patient Instructions/AVS.     Return in about 1 year (around 7/9/2025) for restless leg, medicare wellness.     Subjective   The following acute and/or chronic problems were also addressed today: see yearly note     Patient's complete Health Risk Assessment and screening values have been reviewed and are found in Flowsheets. The following problems were reviewed today and where indicated follow up appointments were made and/or referrals ordered.    Positive Risk Factor Screenings with Interventions:               General HRA Questions:  Select all that apply: (!) New or Increased Pain (Lower abdomen pain)    Pain Interventions:  Pt has had CT scan abnormal so having MRI abd/pelvis this week and referral to [facility] hematology -- appt July 17th        Dentist Screen:  Have you seen the dentist within the past year?: (!) No    Intervention:  Advised to schedule with their dentist                  Objective   Vitals:    07/09/24 1043   BP: 136/60   Pulse: 70   SpO2: 95%   Weight: 71.2 kg (157 lb)   Height: 1.575 m (5' 2\")      Body mass index is 28.72 kg/m².      PE - see yearly note       Allergies   Allergen Reactions    Macrobid [Nitrofurantoin Monohydrate Macrocrystals] Itching, Rash and Other (See Comments)     headache    Nitrofurantoin Itching, Other (See Comments) and Rash     Prior to Visit Medications    Medication Sig Taking? Authorizing Provider   pramipexole (MIRAPEX) 1.5 MG tablet Take 1 tablet by mouth nightly Yes

## 2024-07-09 NOTE — PATIENT INSTRUCTIONS
SURVEY:    You may be receiving a survey from [facility] your [facility] today.    Please complete the survey to enable us to provide the highest quality of care to you and your family.    If you cannot score us a very good (5 Stars) on any question, please call the office to discuss how we could have made your experience a very good one.    Thank you.    Clinical Care Team: MD Niko Morrison LPN              Triage: Maru Dunaway CMA              Clerical Team: Maru Schroeder        Learning About Dental Care for Older Adults  Dental care for older adults: Overview  Dental care for older people is much the same as for younger adults. But older adults do have concerns that younger adults do not. Older adults may have problems with gum disease and decay on the roots of their teeth. They may need missing teeth replaced or broken fillings fixed. Or they may have dentures that need to be cared for. Some older adults may have trouble holding a toothbrush.  You can help remind the person you are caring for to brush and floss their teeth or to clean their dentures. In some cases, you may need to do the brushing and other dental care tasks. People who have trouble using their hands or who have dementia may need this extra help.  How can you help with dental care?  Normal dental care  To keep the teeth and gums healthy:  Brush the teeth with fluoride toothpaste twice a day--in the morning and at night--and floss at least once a day. Plaque can quickly build up on the teeth of older adults.  Watch for the signs of gum disease. These signs include gums that bleed after brushing or after eating hard foods, such as apples.  See a dentist regularly. Many experts recommend checkups every 6 months.  Keep the dentist up to date on any new medications the person is taking.  Encourage a

## 2024-07-09 NOTE — PROGRESS NOTES
HPI Notes    Name: Ruth Hutton  : 1940        Chief Complaint:     Chief Complaint   Patient presents with    Medicare AWV     Patient here today for AWV    Leg Pain     Restless leg syndrome    Abdominal Pain     Patient complains of lower abdomen pain.       History of Present Illness:     Ruth Hutton is a 84 y.o.  female who presents with Medicare AWV (Patient here today for AWV), Leg Pain (Restless leg syndrome), and Abdominal Pain (Patient complains of lower abdomen pain.)      Leg Pain   There was no injury mechanism (pt has chronic restless legs and no concerns.). Pertinent negatives include no inability to bear weight, numbness or tingling. Exacerbated by: resting at night or occ during daytime if she sits down. Treatments tried: mirapex.   Abdominal Pain  This is a recurrent problem. The current episode started more than 1 month ago (pt has been having abdominal pain basically daily.  No bowel or bladder changes. Occ back pain.). The onset quality is gradual. The problem occurs constantly. The problem has been gradually improving. The pain is located in the suprapubic region. The quality of the pain is aching. Associated symptoms include weight loss. Pertinent negatives include no anorexia, belching, constipation, diarrhea, dysuria, fever, flatus, headaches, hematochezia, hematuria, melena, nausea or vomiting. Nothing aggravates the pain. The pain is relieved by Nothing. Prior diagnostic workup includes CT scan (pt saw my partnes Dr Frankel last week and had bladder u/s then a CT scan -- which showed Lt >Rt sided abdominal mass.).     Past Medical History:     Past Medical History:   Diagnosis Date    Urinary incontinence     bladder dropped      Reviewed all health maintenance requirements and ordered appropriate tests  Health Maintenance Due   Topic Date Due    DTaP/Tdap/Td vaccine (1 - Tdap) Never done    Shingles vaccine (1 of 2) Never done    DEXA (modify frequency per FRAX score)

## 2024-07-12 ENCOUNTER — HOSPITAL ENCOUNTER (OUTPATIENT)
Dept: MRI IMAGING | Age: 84
End: 2024-07-12
Attending: STUDENT IN AN ORGANIZED HEALTH CARE EDUCATION/TRAINING PROGRAM
Payer: MEDICARE

## 2024-07-12 DIAGNOSIS — R19.00 PELVIC MASS IN FEMALE: ICD-10-CM

## 2024-07-12 PROCEDURE — 6360000004 HC RX CONTRAST MEDICATION: Performed by: STUDENT IN AN ORGANIZED HEALTH CARE EDUCATION/TRAINING PROGRAM

## 2024-07-12 PROCEDURE — A9577 INJ MULTIHANCE: HCPCS | Performed by: STUDENT IN AN ORGANIZED HEALTH CARE EDUCATION/TRAINING PROGRAM

## 2024-07-12 PROCEDURE — 72197 MRI PELVIS W/O & W/DYE: CPT

## 2024-07-12 RX ADMIN — GADOBENATE DIMEGLUMINE 15 ML: 529 INJECTION, SOLUTION INTRAVENOUS at 10:50

## 2024-07-17 ENCOUNTER — OFFICE VISIT (OUTPATIENT)
Dept: ONCOLOGY | Age: 84
End: 2024-07-17
Payer: MEDICARE

## 2024-07-17 VITALS
RESPIRATION RATE: 18 BRPM | SYSTOLIC BLOOD PRESSURE: 160 MMHG | DIASTOLIC BLOOD PRESSURE: 74 MMHG | HEART RATE: 77 BPM | TEMPERATURE: 96.3 F | BODY MASS INDEX: 28.52 KG/M2 | WEIGHT: 155 LBS | HEIGHT: 62 IN

## 2024-07-17 DIAGNOSIS — R19.00 PELVIC MASS IN FEMALE: Primary | ICD-10-CM

## 2024-07-17 DIAGNOSIS — R97.8 OTHER ABNORMAL TUMOR MARKERS: ICD-10-CM

## 2024-07-17 PROCEDURE — 99202 OFFICE O/P NEW SF 15 MIN: CPT | Performed by: INTERNAL MEDICINE

## 2024-07-17 NOTE — PROGRESS NOTES
no palpable lymphadenopathy, no hepatosplenomegaly  Chest - clear to auscultation, no wheezes, rales or rhonchi, symmetric air entry  Heart - normal rate, regular rhythm, normal S1, S2, no murmurs, rubs, clicks or gallops  Abdomen - soft, nontender, nondistended, pelvic discomfort.  Neurological - alert, oriented, normal speech, no focal findings or movement disorder noted  Musculoskeletal - no joint tenderness, deformity or swelling  Extremities - peripheral pulses normal, no pedal edema, no clubbing or cyanosis  Skin - normal coloration and turgor, no rashes, no suspicious skin lesions noted     Review of Diagnostic data:   Lab Results   Component Value Date    WBC 5.9 02/27/2020    HGB 13.6 02/27/2020    HCT 40.8 02/27/2020    MCV 86.1 02/27/2020     02/27/2020       Chemistry        Component Value Date/Time     02/27/2020 0826    K 4.3 02/27/2020 0826     02/27/2020 0826    CO2 24 02/27/2020 0826    BUN 16 07/03/2024 1240    CREATININE 0.7 07/03/2024 1240        Component Value Date/Time    CALCIUM 9.9 02/27/2020 0826    ALKPHOS 70 02/27/2020 0826    AST 17 02/27/2020 0826    ALT 12 02/27/2020 0826    BILITOT 0.32 02/27/2020 0826        CT PELVIS W CONTRAST Additional Contrast? Oral  Narrative: EXAMINATION: CT PELVIS W CONTRAST     HISTORY: Urinary retention. Pelvic pressure.     COMPARISON: 4/4/2012.     TECHNIQUE: Helical axial images were obtained through the pelvis after IV   contrast with delayed images through the bladder.    Dose reduction techniques were achieved by using automated exposure control   and/or adjustment of mA and/or kV according to patient size and/or use of   iterative reconstruction technique.    FINDINGS: The patient appears to have had a hysterectomy although it s unclear   whether the ovaries were removed as well. There are complex cystic and solid   masses in the pelvis bilaterally suspicious for ovarian carcinoma versus   metastases versus primary peritoneal

## 2024-07-18 ENCOUNTER — HOSPITAL ENCOUNTER (OUTPATIENT)
Age: 84
Discharge: HOME | End: 2024-07-18
Payer: MEDICARE

## 2024-07-18 ENCOUNTER — TELEPHONE (OUTPATIENT)
Dept: ONCOLOGY | Age: 84
End: 2024-07-18

## 2024-07-18 DIAGNOSIS — R97.8 OTHER ABNORMAL TUMOR MARKERS: ICD-10-CM

## 2024-07-18 DIAGNOSIS — R19.00 PELVIC MASS IN FEMALE: ICD-10-CM

## 2024-07-18 LAB — CANCER AG125 SERPL-ACNC: 249 U/ML (ref 0–38)

## 2024-07-18 PROCEDURE — 86304 IMMUNOASSAY TUMOR CA 125: CPT

## 2024-07-18 PROCEDURE — 36415 COLL VENOUS BLD VENIPUNCTURE: CPT

## 2024-07-18 NOTE — TELEPHONE ENCOUNTER
Spoke this morning with Dena at gyn onc in [place] and she informed that PET is 8/1/24 and likely a biopsy will be 7/24/24.  At this time returned call to Gyn/Onc Dena to inform her that the interventional radiologist is not comfortable doing the biopsy procedure here and would like GYN ONC to handle that as well.  Will forward this note to Dr. Alegria to notify him of same.  Tentative appointment with Gyn/Onc was to be 7/26/24.    Call back from Shaneka with IR - Dr. Tucker reviewed case more thoroughly and he will perform biopsy on 7/24/24.  Shaneka will schedule patient.  I have notified Dena with gyn/onc that biopsy will be done on 7/24/24.

## 2024-07-18 NOTE — TELEPHONE ENCOUNTER
Name: Ruth Hutton  : 1940  MRN: [id number]    Oncology Navigation- Initial Note:    Intake-  Contact Type: Telephone    Continuum of Care: Diagnosis/Active Treatment    Smoking hx:    Pt denies history of smoking    Notes: Referral received from Constance MACDONALD RN by phone call.  Call made to Ruth.  Writer introduced self as ONN and navigation program.  Ruth lives at home with spouse and has good family support.  Has medicare coverage.  Denies transportation issues.  Denies issues with eating or drinking.  Denies spiritual care needs at this time.  PET scan is scheduled for 24 in [place].  (Per Constance the IR doctor does not want to do biopsy.  Constance is waiting for call by from Gyn Onc and will notify them and will let Dr. Alegria know as well. ) Pt informed that Constance has been in touch with Gyn Onc office and she will be contact by either myself or their office with appointment.  Ruth states that she had her labs done this morning at [facility].  Ruth denies any questions or needs from navigator at this time.  Contact information given.  Pt informed, navigation folder will be sent in mail.  Ruth thanked writer for calling.  Encouraged Ruth to call with any questions or needs. Understanding verbalized.    Electronically signed by Keerthi Kurtz RN on 2024 at 2:25 PM

## 2024-07-23 ENCOUNTER — HOSPITAL ENCOUNTER (OUTPATIENT)
Age: 84
End: 2024-07-23
Payer: MEDICARE

## 2024-07-24 ENCOUNTER — HOSPITAL ENCOUNTER (OUTPATIENT)
Dept: CT IMAGING | Age: 84
Discharge: HOME | End: 2024-07-26
Payer: MEDICARE

## 2024-07-24 ENCOUNTER — HOSPITAL ENCOUNTER (OUTPATIENT)
Dept: ULTRASOUND IMAGING | Age: 84
Discharge: HOME | End: 2024-07-26
Payer: MEDICARE

## 2024-07-24 ENCOUNTER — HOSPITAL ENCOUNTER (OUTPATIENT)
Dept: INTERVENTIONAL RADIOLOGY/VASCULAR | Age: 84
Discharge: HOME | End: 2024-07-26
Payer: MEDICARE

## 2024-07-24 VITALS
TEMPERATURE: 97.1 F | SYSTOLIC BLOOD PRESSURE: 181 MMHG | OXYGEN SATURATION: 97 % | RESPIRATION RATE: 16 BRPM | HEART RATE: 75 BPM | DIASTOLIC BLOOD PRESSURE: 63 MMHG

## 2024-07-24 DIAGNOSIS — R19.00 ABDOMINAL MASS: ICD-10-CM

## 2024-07-24 DIAGNOSIS — R19.00 ABDOMINAL MASS, UNSPECIFIED ABDOMINAL LOCATION: ICD-10-CM

## 2024-07-24 LAB
INR PPP: 1.1
PLATELET # BLD AUTO: 251 K/UL (ref 138–453)
PROTHROMBIN TIME: 13.6 SEC (ref 11.7–14.1)

## 2024-07-24 PROCEDURE — 88341 IMHCHEM/IMCYTCHM EA ADD ANTB: CPT

## 2024-07-24 PROCEDURE — 85049 AUTOMATED PLATELET COUNT: CPT

## 2024-07-24 PROCEDURE — 88342 IMHCHEM/IMCYTCHM 1ST ANTB: CPT

## 2024-07-24 PROCEDURE — 74150 CT ABDOMEN W/O CONTRAST: CPT

## 2024-07-24 PROCEDURE — 76942 ECHO GUIDE FOR BIOPSY: CPT

## 2024-07-24 PROCEDURE — 36415 COLL VENOUS BLD VENIPUNCTURE: CPT

## 2024-07-24 PROCEDURE — 88333 PATH CONSLTJ SURG CYTO XM 1: CPT

## 2024-07-24 PROCEDURE — 88305 TISSUE EXAM BY PATHOLOGIST: CPT

## 2024-07-24 PROCEDURE — 85610 PROTHROMBIN TIME: CPT

## 2024-07-24 PROCEDURE — 6360000002 HC RX W HCPCS: Performed by: RADIOLOGY

## 2024-07-24 PROCEDURE — 2709999900 HC NON-CHARGEABLE SUPPLY

## 2024-07-24 PROCEDURE — 88334 PATH CONSLTJ SURG CYTO XM EA: CPT

## 2024-07-24 PROCEDURE — 2500000003 HC RX 250 WO HCPCS: Performed by: RADIOLOGY

## 2024-07-24 RX ORDER — LIDOCAINE HYDROCHLORIDE 10 MG/ML
INJECTION, SOLUTION EPIDURAL; INFILTRATION; INTRACAUDAL; PERINEURAL PRN
Status: COMPLETED | OUTPATIENT
Start: 2024-07-24 | End: 2024-07-24

## 2024-07-24 RX ORDER — FENTANYL CITRATE 50 UG/ML
INJECTION, SOLUTION INTRAMUSCULAR; INTRAVENOUS PRN
Status: COMPLETED | OUTPATIENT
Start: 2024-07-24 | End: 2024-07-24

## 2024-07-24 RX ADMIN — LIDOCAINE HYDROCHLORIDE 10 ML: 10 INJECTION, SOLUTION EPIDURAL; INFILTRATION; INTRACAUDAL; PERINEURAL at 10:19

## 2024-07-24 RX ADMIN — FENTANYL CITRATE 25 MCG: 50 INJECTION, SOLUTION INTRAMUSCULAR; INTRAVENOUS at 10:11

## 2024-07-24 RX ADMIN — FENTANYL CITRATE 25 MCG: 50 INJECTION, SOLUTION INTRAMUSCULAR; INTRAVENOUS at 10:00

## 2024-07-24 NOTE — OR NURSING
Samples approved by pathology.  Site scanned with US and reviewed by   Patient tolerated procedure well.

## 2024-07-25 ENCOUNTER — TELEPHONE (OUTPATIENT)
Dept: FAMILY MEDICINE CLINIC | Age: 84
End: 2024-07-25

## 2024-07-25 NOTE — TELEPHONE ENCOUNTER
I was able to connect with MsJulio Hutton and share the results of her MRI. She has already had tissue sampling completed and has an appt with GYN/Onc tomorrow.

## 2024-07-25 NOTE — TELEPHONE ENCOUNTER
Called Ms. Hutton back and shared the results of her MRI. She did not answer and I LVM to return a call.

## 2024-07-26 LAB — SURGICAL PATHOLOGY REPORT: NORMAL

## 2024-07-29 ENCOUNTER — TELEPHONE (OUTPATIENT)
Dept: ONCOLOGY | Age: 84
End: 2024-07-29

## 2024-07-29 DIAGNOSIS — C48.2 CARCINOMA OF PERITONEUM (HCC): ICD-10-CM

## 2024-07-29 DIAGNOSIS — C56.9 CARCINOMA OF OVARY, UNSPECIFIED LATERALITY (HCC): Primary | ICD-10-CM

## 2024-07-29 DIAGNOSIS — C48.2 MALIGNANT NEOPLASM OF PERITONEUM, UNSPECIFIED (HCC): Primary | ICD-10-CM

## 2024-07-29 DIAGNOSIS — C56.3 MALIGNANT NEOPLASM OF BOTH OVARIES (HCC): Primary | ICD-10-CM

## 2024-07-31 ENCOUNTER — HOSPITAL ENCOUNTER (OUTPATIENT)
Dept: CT IMAGING | Age: 84
Discharge: HOME | End: 2024-08-02
Payer: MEDICARE

## 2024-07-31 ENCOUNTER — TELEPHONE (OUTPATIENT)
Dept: ONCOLOGY | Age: 84
End: 2024-07-31

## 2024-07-31 DIAGNOSIS — R19.00 INTRA-ABDOMINAL AND PELVIC SWELLING, MASS AND LUMP, UNSPECIFIED SITE: ICD-10-CM

## 2024-07-31 DIAGNOSIS — R19.00 PELVIC MASS: ICD-10-CM

## 2024-07-31 DIAGNOSIS — R97.8 ABNORMAL TUMOR MARKERS: ICD-10-CM

## 2024-07-31 PROCEDURE — 6360000004 HC RX CONTRAST MEDICATION: Performed by: NURSE PRACTITIONER

## 2024-07-31 PROCEDURE — 74177 CT ABD & PELVIS W/CONTRAST: CPT

## 2024-07-31 RX ADMIN — IOPAMIDOL 75 ML: 755 INJECTION, SOLUTION INTRAVENOUS at 12:43

## 2024-07-31 RX ADMIN — IOPAMIDOL 18 ML: 755 INJECTION, SOLUTION INTRAVENOUS at 11:20

## 2024-07-31 NOTE — TELEPHONE ENCOUNTER
Name: Ruth Hutton  : 1940  MRN: [id number]    Oncology Navigation Follow-Up Note    Contact Type:  Telephone    Notes: Call made to patient for follow up.  Spoke to Ruth.  Ruth states she just got home from CT scan in [place].  Ruth states she wants to cancel her PET scan that is scheduled for tomorrow 24. Call made to [facility] and canceled scan at pt's request. Per [facility] representative PET scan has already been canceled.  Pt scheduled for f/u with Dr. Mcdaniel on 24 at 9 am.  Pt states it is her understanding that she is to have 3 rounds of chemotherapy and then a CT scan and then surgery.  Constance JIMÉNEZ RN Charge nurse updated.      Electronically signed by Keerthi Kurtz RN on 2024 at 3:27 PM

## 2024-08-07 ENCOUNTER — TELEPHONE (OUTPATIENT)
Dept: ONCOLOGY | Age: 84
End: 2024-08-07

## 2024-08-07 ENCOUNTER — OFFICE VISIT (OUTPATIENT)
Dept: ONCOLOGY | Age: 84
End: 2024-08-07
Payer: MEDICARE

## 2024-08-07 VITALS
BODY MASS INDEX: 28.52 KG/M2 | HEIGHT: 62 IN | DIASTOLIC BLOOD PRESSURE: 74 MMHG | HEART RATE: 83 BPM | SYSTOLIC BLOOD PRESSURE: 149 MMHG | RESPIRATION RATE: 18 BRPM | WEIGHT: 155 LBS | TEMPERATURE: 97.2 F

## 2024-08-07 DIAGNOSIS — R19.00 PELVIC MASS IN FEMALE: Primary | ICD-10-CM

## 2024-08-07 DIAGNOSIS — C76.2 ABDOMINAL CARCINOMATOSIS (HCC): ICD-10-CM

## 2024-08-07 DIAGNOSIS — C56.3 MALIGNANT NEOPLASM OF BOTH OVARIES (HCC): Primary | ICD-10-CM

## 2024-08-07 DIAGNOSIS — C56.3 MALIGNANT NEOPLASM OF BOTH OVARIES (HCC): ICD-10-CM

## 2024-08-07 PROCEDURE — 99215 OFFICE O/P EST HI 40 MIN: CPT | Performed by: INTERNAL MEDICINE

## 2024-08-07 PROCEDURE — 99211 OFF/OP EST MAY X REQ PHY/QHP: CPT | Performed by: INTERNAL MEDICINE

## 2024-08-07 PROCEDURE — G8417 CALC BMI ABV UP PARAM F/U: HCPCS | Performed by: INTERNAL MEDICINE

## 2024-08-07 PROCEDURE — 1036F TOBACCO NON-USER: CPT | Performed by: INTERNAL MEDICINE

## 2024-08-07 PROCEDURE — G8400 PT W/DXA NO RESULTS DOC: HCPCS | Performed by: INTERNAL MEDICINE

## 2024-08-07 PROCEDURE — 1090F PRES/ABSN URINE INCON ASSESS: CPT | Performed by: INTERNAL MEDICINE

## 2024-08-07 PROCEDURE — 1123F ACP DISCUSS/DSCN MKR DOCD: CPT | Performed by: INTERNAL MEDICINE

## 2024-08-07 PROCEDURE — G8427 DOCREV CUR MEDS BY ELIG CLIN: HCPCS | Performed by: INTERNAL MEDICINE

## 2024-08-07 RX ORDER — DIPHENHYDRAMINE HYDROCHLORIDE 50 MG/ML
50 INJECTION INTRAMUSCULAR; INTRAVENOUS ONCE
OUTPATIENT
Start: 2024-08-14 | End: 2024-08-14

## 2024-08-07 RX ORDER — SODIUM CHLORIDE 0.9 % (FLUSH) 0.9 %
5-40 SYRINGE (ML) INJECTION PRN
OUTPATIENT
Start: 2024-08-07

## 2024-08-07 RX ORDER — SODIUM CHLORIDE 0.9 % (FLUSH) 0.9 %
5-40 SYRINGE (ML) INJECTION PRN
OUTPATIENT
Start: 2024-08-14

## 2024-08-07 RX ORDER — FAMOTIDINE 10 MG/ML
20 INJECTION, SOLUTION INTRAVENOUS
OUTPATIENT
Start: 2024-08-14

## 2024-08-07 RX ORDER — ALBUTEROL SULFATE 90 UG/1
4 AEROSOL, METERED RESPIRATORY (INHALATION) PRN
OUTPATIENT
Start: 2024-08-07

## 2024-08-07 RX ORDER — SODIUM CHLORIDE 9 MG/ML
5-250 INJECTION, SOLUTION INTRAVENOUS PRN
OUTPATIENT
Start: 2024-08-14

## 2024-08-07 RX ORDER — HEPARIN SODIUM (PORCINE) LOCK FLUSH IV SOLN 100 UNIT/ML 100 UNIT/ML
500 SOLUTION INTRAVENOUS PRN
OUTPATIENT
Start: 2024-08-07

## 2024-08-07 RX ORDER — EPINEPHRINE 1 MG/ML
0.3 INJECTION, SOLUTION, CONCENTRATE INTRAVENOUS PRN
OUTPATIENT
Start: 2024-08-14

## 2024-08-07 RX ORDER — EPINEPHRINE 1 MG/ML
0.3 INJECTION, SOLUTION, CONCENTRATE INTRAVENOUS PRN
OUTPATIENT
Start: 2024-08-07

## 2024-08-07 RX ORDER — ACETAMINOPHEN 325 MG/1
650 TABLET ORAL
OUTPATIENT
Start: 2024-08-07

## 2024-08-07 RX ORDER — HEPARIN SODIUM (PORCINE) LOCK FLUSH IV SOLN 100 UNIT/ML 100 UNIT/ML
500 SOLUTION INTRAVENOUS PRN
OUTPATIENT
Start: 2024-08-14

## 2024-08-07 RX ORDER — LIDOCAINE AND PRILOCAINE 25; 25 MG/G; MG/G
CREAM TOPICAL
Qty: 30 G | Refills: 2 | Status: SHIPPED | OUTPATIENT
Start: 2024-08-07

## 2024-08-07 RX ORDER — ALBUTEROL SULFATE 90 UG/1
4 AEROSOL, METERED RESPIRATORY (INHALATION) PRN
OUTPATIENT
Start: 2024-08-14

## 2024-08-07 RX ORDER — FAMOTIDINE 10 MG/ML
20 INJECTION, SOLUTION INTRAVENOUS
OUTPATIENT
Start: 2024-08-07

## 2024-08-07 RX ORDER — SODIUM CHLORIDE 9 MG/ML
25 INJECTION, SOLUTION INTRAVENOUS PRN
OUTPATIENT
Start: 2024-08-07

## 2024-08-07 RX ORDER — PROCHLORPERAZINE EDISYLATE 5 MG/ML
5 INJECTION INTRAMUSCULAR; INTRAVENOUS
OUTPATIENT
Start: 2024-08-14

## 2024-08-07 RX ORDER — ONDANSETRON 2 MG/ML
8 INJECTION INTRAMUSCULAR; INTRAVENOUS
OUTPATIENT
Start: 2024-08-07

## 2024-08-07 RX ORDER — FAMOTIDINE 10 MG/ML
20 INJECTION, SOLUTION INTRAVENOUS ONCE
OUTPATIENT
Start: 2024-08-14 | End: 2024-08-14

## 2024-08-07 RX ORDER — DIPHENHYDRAMINE HYDROCHLORIDE 50 MG/ML
50 INJECTION INTRAMUSCULAR; INTRAVENOUS
OUTPATIENT
Start: 2024-08-14

## 2024-08-07 RX ORDER — ONDANSETRON HYDROCHLORIDE 8 MG/1
8 TABLET, FILM COATED ORAL EVERY 8 HOURS PRN
Qty: 30 TABLET | Refills: 2 | Status: SHIPPED | OUTPATIENT
Start: 2024-08-07

## 2024-08-07 RX ORDER — SODIUM CHLORIDE 9 MG/ML
INJECTION, SOLUTION INTRAVENOUS CONTINUOUS
OUTPATIENT
Start: 2024-08-07

## 2024-08-07 RX ORDER — SODIUM CHLORIDE 9 MG/ML
INJECTION, SOLUTION INTRAVENOUS CONTINUOUS
OUTPATIENT
Start: 2024-08-14

## 2024-08-07 RX ORDER — ONDANSETRON 2 MG/ML
8 INJECTION INTRAMUSCULAR; INTRAVENOUS
OUTPATIENT
Start: 2024-08-14

## 2024-08-07 RX ORDER — PALONOSETRON 0.05 MG/ML
0.25 INJECTION, SOLUTION INTRAVENOUS ONCE
OUTPATIENT
Start: 2024-08-14 | End: 2024-08-14

## 2024-08-07 RX ORDER — DIPHENHYDRAMINE HYDROCHLORIDE 50 MG/ML
50 INJECTION INTRAMUSCULAR; INTRAVENOUS
OUTPATIENT
Start: 2024-08-07

## 2024-08-07 RX ORDER — MEPERIDINE HYDROCHLORIDE 50 MG/ML
12.5 INJECTION INTRAMUSCULAR; INTRAVENOUS; SUBCUTANEOUS PRN
Status: CANCELLED | OUTPATIENT
Start: 2024-08-14

## 2024-08-07 RX ORDER — OMEPRAZOLE 20 MG/1
20 TABLET, DELAYED RELEASE ORAL DAILY
Qty: 30 TABLET | Refills: 3 | Status: SHIPPED | OUTPATIENT
Start: 2024-08-07

## 2024-08-07 RX ORDER — ACETAMINOPHEN 325 MG/1
650 TABLET ORAL
OUTPATIENT
Start: 2024-08-14

## 2024-08-07 NOTE — TELEPHONE ENCOUNTER
Name: Ruth Hutton  : 1940  MRN: [id number]    Oncology Navigation Follow-Up Note    Contact Type:  Medical Oncology    Notes: Navigator met with Ruth, spouse (Kehinde), and sister in clinic.  Ruth given navigation folder with writer's contact information and office hours.  Ruth states she is ready to start treatment.  States she is okay with having one treatment peripherally if necessary to get treatment expedited.  Ruth denies questions at this time.  Reinforced that she will be contacted regarding Port placement and chemo teach appointment.  Ruth encouraged to call with any questions or needs.      Office note and CT scan faxed to Dr. Sharpe's office as requested to [phone].    Electronically signed by Keerthi uKrtz RN on 2024 at 10:45 AM

## 2024-08-07 NOTE — PATIENT INSTRUCTIONS
Please see orders for chemotherapy.  While we are ordering Taxol and carboplatin, we plan to start with single agent carboplatin and consider adding Taxol with second cycle.  No need for steroids as long as she is not on Taxol  Need Mediport before starting chemotherapy  Patient wants to start as soon as possible so please bring her after inserting a Mediport to get started with treatment  Return to see 1 of us in the next few weeks

## 2024-08-07 NOTE — PROGRESS NOTES
spleen, adrenal glands, or pancreas.    Interval mild bilateral hydronephrosis.  No suspicious focal kidney  abnormality.  No stones.    GI/Bowel: Moderate retained stool transverse and left colon primarily with  somewhat matted loops right and inferior pelvis, but no obstruction, or  marked wall thickening.  Contrast filled distal small bowel loops  unremarkable.  Fullness jejunal loops.  Unremarkable stomach and duodenum.    Pelvis: Interval slight enlargement left-sided cystic and solid partially  enhancing lobulated lower abdominal and pelvic presumed ovarian masses,  measuring ~ 14.5 x 7.4 cm vs 12.5 x 6.8 cm (at same location on the left).  Multiple cystic/solid presumed ovarian mass elements along right pelvic  sidewall appear similar, contiguous with mildly thickened small bowel.  Abutting urinary bladder appears unchanged, without focal mass or wall  thickening.  No large free fluid component.  Surgical absence uterus.    Peritoneum/Retroperitoneum: Heavy calcific ASVD aorta and mildly elongated  iliac arteries unchanged.  Circumaortic left renal vein.  No bulky  lymphadenopathy.  Extensive calcified omental caking mid/left anterior mid  abdomen, increased slightly as compared to the previous study.  Slightly  larger partially calcified implant along the anterior left lobe liver, now 10  mm versus 7 mm previously (slices 45-47, series 7).  Additional peritoneal  nodularity, better seen on MRI.    Bones/Soft Tissues: No acute bony or soft tissue abnormality; hip and spine  degenerative changes unchanged, former greater on the right.  Impression: 1. Abdominopelvic masses compatible with biopsy-proven serous ovarian  carcinoma re-identified with interval slight enlargement on the left.  2. Interval increase in the extensive calcified omental involvement, as  compared to the previous study, including a slightly larger partially  calcified implant along the anterior left lobe liver. Additional

## 2024-08-08 ENCOUNTER — CLINICAL DOCUMENTATION (OUTPATIENT)
Facility: HOSPITAL | Age: 84
End: 2024-08-08

## 2024-08-08 NOTE — PROGRESS NOTES
Patient Assistance                   Additional notes: Reviewed chart and no assistance is needed.  Patient has Medicare and a supplement.

## 2024-08-09 RX ORDER — DEXAMETHASONE SODIUM PHOSPHATE 10 MG/ML
10 INJECTION, SOLUTION INTRAMUSCULAR; INTRAVENOUS ONCE
OUTPATIENT
Start: 2024-08-14 | End: 2024-08-14

## 2024-08-13 ENCOUNTER — HOSPITAL ENCOUNTER (OUTPATIENT)
Dept: INFUSION THERAPY | Age: 84
Discharge: HOME | End: 2024-08-13

## 2024-08-13 NOTE — PROGRESS NOTES
Chemotherapy teaching session conducted with patient, her  and her daughter.  See Chemotherapy Teaching Sheets (scanned into chart).  Discussed chemotherapy drugs Carboplatin and Taxol and their side effects. Drug information provided in writing.  Explained procedure for contacting our office during and after office hours as described on Contact Information form that was also included in patient's packet.  Importance of contacting center or physician for changes in condition, uncontrolled side effects or troubling symptoms was stressed. Discussed home precautions after chemotherapy, neutropenia precautions (low WBC's) and thrombocytopenia precautions (low platelets).  Questions answered to patient's satisfaction.  Patient verbalized understanding of information discussed.  All information provided to patient in writing. Pt scheduled for her first treatment in [place] on Friday 8/16/24.

## 2024-08-16 ENCOUNTER — HOSPITAL ENCOUNTER (OUTPATIENT)
Dept: INFUSION THERAPY | Age: 84
Discharge: HOME | End: 2024-08-16
Payer: MEDICARE

## 2024-08-16 VITALS
TEMPERATURE: 97.5 F | RESPIRATION RATE: 18 BRPM | HEIGHT: 62 IN | DIASTOLIC BLOOD PRESSURE: 74 MMHG | BODY MASS INDEX: 28.52 KG/M2 | SYSTOLIC BLOOD PRESSURE: 174 MMHG | HEART RATE: 71 BPM | WEIGHT: 155 LBS

## 2024-08-16 DIAGNOSIS — C76.2 ABDOMINAL CARCINOMATOSIS (HCC): ICD-10-CM

## 2024-08-16 DIAGNOSIS — C56.3 MALIGNANT NEOPLASM OF BOTH OVARIES (HCC): Primary | ICD-10-CM

## 2024-08-16 LAB
ALBUMIN SERPL-MCNC: 4 G/DL (ref 3.5–5.2)
ALBUMIN/GLOB SERPL: 1.8 {RATIO} (ref 1–2.5)
ALP SERPL-CCNC: 69 U/L (ref 35–104)
ALT SERPL-CCNC: 7 U/L (ref 5–33)
ANION GAP SERPL CALCULATED.3IONS-SCNC: 9 MMOL/L (ref 9–17)
AST SERPL-CCNC: 13 U/L
BASOPHILS # BLD: 0.07 K/UL (ref 0–0.2)
BASOPHILS NFR BLD: 1 % (ref 0–2)
BILIRUB SERPL-MCNC: 0.3 MG/DL (ref 0.3–1.2)
BUN SERPL-MCNC: 16 MG/DL (ref 8–23)
BUN/CREAT SERPL: 27 (ref 9–20)
CALCIUM SERPL-MCNC: 9 MG/DL (ref 8.6–10.4)
CHLORIDE SERPL-SCNC: 104 MMOL/L (ref 98–107)
CO2 SERPL-SCNC: 26 MMOL/L (ref 20–31)
CREAT SERPL-MCNC: 0.6 MG/DL (ref 0.5–0.9)
EOSINOPHIL # BLD: 0.15 K/UL (ref 0–0.44)
EOSINOPHILS RELATIVE PERCENT: 2 % (ref 1–4)
ERYTHROCYTE [DISTWIDTH] IN BLOOD BY AUTOMATED COUNT: 13.4 % (ref 11.8–14.4)
GFR, ESTIMATED: 88 ML/MIN/1.73M2
GLUCOSE SERPL-MCNC: 98 MG/DL (ref 70–99)
HCT VFR BLD AUTO: 36.4 % (ref 36.3–47.1)
HGB BLD-MCNC: 12.4 G/DL (ref 11.9–15.1)
IMM GRANULOCYTES # BLD AUTO: 0.03 K/UL (ref 0–0.3)
IMM GRANULOCYTES NFR BLD: 1 %
LYMPHOCYTES NFR BLD: 0.79 K/UL (ref 1.1–3.7)
LYMPHOCYTES RELATIVE PERCENT: 13 % (ref 24–43)
MCH RBC QN AUTO: 29.4 PG (ref 25.2–33.5)
MCHC RBC AUTO-ENTMCNC: 34.1 G/DL (ref 28.4–34.8)
MCV RBC AUTO: 86.3 FL (ref 82.6–102.9)
MONOCYTES NFR BLD: 0.64 K/UL (ref 0.1–1.2)
MONOCYTES NFR BLD: 10 % (ref 3–12)
NEUTROPHILS NFR BLD: 73 % (ref 36–65)
NEUTS SEG NFR BLD: 4.63 K/UL (ref 1.5–8.1)
NRBC BLD-RTO: 0 PER 100 WBC
PLATELET # BLD AUTO: 262 K/UL (ref 138–453)
PMV BLD AUTO: 10.1 FL (ref 8.1–13.5)
POTASSIUM SERPL-SCNC: 4.3 MMOL/L (ref 3.7–5.3)
PROT SERPL-MCNC: 6.2 G/DL (ref 6.4–8.3)
RBC # BLD AUTO: 4.22 M/UL (ref 3.95–5.11)
SODIUM SERPL-SCNC: 139 MMOL/L (ref 135–144)
WBC OTHER # BLD: 6.3 K/UL (ref 3.5–11.3)

## 2024-08-16 PROCEDURE — 36415 COLL VENOUS BLD VENIPUNCTURE: CPT

## 2024-08-16 PROCEDURE — 96366 THER/PROPH/DIAG IV INF ADDON: CPT

## 2024-08-16 PROCEDURE — 85025 COMPLETE CBC W/AUTO DIFF WBC: CPT

## 2024-08-16 PROCEDURE — 2580000003 HC RX 258: Performed by: INTERNAL MEDICINE

## 2024-08-16 PROCEDURE — 80053 COMPREHEN METABOLIC PANEL: CPT

## 2024-08-16 PROCEDURE — 96375 TX/PRO/DX INJ NEW DRUG ADDON: CPT

## 2024-08-16 PROCEDURE — 6360000002 HC RX W HCPCS: Performed by: INTERNAL MEDICINE

## 2024-08-16 PROCEDURE — 96413 CHEMO IV INFUSION 1 HR: CPT

## 2024-08-16 RX ORDER — SODIUM CHLORIDE 0.9 % (FLUSH) 0.9 %
5-40 SYRINGE (ML) INJECTION PRN
Status: DISCONTINUED | OUTPATIENT
Start: 2024-08-16 | End: 2024-08-17 | Stop reason: HOSPADM

## 2024-08-16 RX ORDER — PALONOSETRON 0.05 MG/ML
0.25 INJECTION, SOLUTION INTRAVENOUS ONCE
Status: COMPLETED | OUTPATIENT
Start: 2024-08-16 | End: 2024-08-16

## 2024-08-16 RX ORDER — SODIUM CHLORIDE 9 MG/ML
5-250 INJECTION, SOLUTION INTRAVENOUS PRN
Status: DISCONTINUED | OUTPATIENT
Start: 2024-08-16 | End: 2024-08-17 | Stop reason: HOSPADM

## 2024-08-16 RX ORDER — DEXAMETHASONE SODIUM PHOSPHATE 10 MG/ML
10 INJECTION, SOLUTION INTRAMUSCULAR; INTRAVENOUS ONCE
Status: COMPLETED | OUTPATIENT
Start: 2024-08-16 | End: 2024-08-16

## 2024-08-16 RX ADMIN — PALONOSETRON 0.25 MG: 0.05 INJECTION, SOLUTION INTRAVENOUS at 10:26

## 2024-08-16 RX ADMIN — SODIUM CHLORIDE 150 MG: 9 INJECTION, SOLUTION INTRAVENOUS at 10:39

## 2024-08-16 RX ADMIN — SODIUM CHLORIDE 250 ML/HR: 9 INJECTION, SOLUTION INTRAVENOUS at 10:25

## 2024-08-16 RX ADMIN — SODIUM CHLORIDE, PRESERVATIVE FREE 10 ML: 5 INJECTION INTRAVENOUS at 09:54

## 2024-08-16 RX ADMIN — DEXAMETHASONE SODIUM PHOSPHATE 10 MG: 10 INJECTION, SOLUTION INTRAMUSCULAR; INTRAVENOUS at 10:28

## 2024-08-16 RX ADMIN — CARBOPLATIN 400 MG: 10 INJECTION, SOLUTION INTRAVENOUS at 11:10

## 2024-08-22 ENCOUNTER — OFFICE VISIT (OUTPATIENT)
Dept: FAMILY MEDICINE CLINIC | Age: 84
End: 2024-08-22

## 2024-08-22 VITALS
DIASTOLIC BLOOD PRESSURE: 62 MMHG | SYSTOLIC BLOOD PRESSURE: 148 MMHG | BODY MASS INDEX: 28.17 KG/M2 | HEART RATE: 74 BPM | OXYGEN SATURATION: 96 % | WEIGHT: 154 LBS

## 2024-08-22 DIAGNOSIS — I10 PRIMARY HYPERTENSION: Primary | ICD-10-CM

## 2024-08-22 RX ORDER — HYDROCHLOROTHIAZIDE 25 MG/1
25 TABLET ORAL DAILY
Qty: 30 TABLET | Refills: 1 | Status: SHIPPED | OUTPATIENT
Start: 2024-08-22

## 2024-08-22 ASSESSMENT — ENCOUNTER SYMPTOMS
FACIAL SWELLING: 0
BLURRED VISION: 0
VOMITING: 0
CONSTIPATION: 0
SHORTNESS OF BREATH: 0
EYE DISCHARGE: 0
DIARRHEA: 0

## 2024-08-22 NOTE — PROGRESS NOTES
HPI Notes    Name: Ruth Hutton  : 1940        Chief Complaint:     Chief Complaint   Patient presents with    Hypertension     Patient here today for elevated blood pressure.        History of Present Illness:     Ruth Hutton is a 84 y.o.  female who presents with Hypertension (Patient here today for elevated blood pressure. )      Hypertension  This is a new problem. The current episode started more than 1 month ago. The problem has been gradually worsening (pt taking home -170s /60-70s) since onset. The problem is uncontrolled (pt has had lower abdominal pain and then work up since  found Ovarian Cancer.  Pt is now having chemo tx in [place].  Pt is feeling good but her BP has been high.). Pertinent negatives include no blurred vision, headaches, malaise/fatigue, palpitations, peripheral edema or shortness of breath. There are no associated agents to hypertension. Risk factors for coronary artery disease include post-menopausal state. Past treatments include nothing. There are no compliance problems.        Past Medical History:     Past Medical History:   Diagnosis Date    Urinary incontinence     bladder dropped      Reviewed all health maintenance requirements and ordered appropriate tests  Health Maintenance Due   Topic Date Due    DTaP/Tdap/Td vaccine (1 - Tdap) Never done    Shingles vaccine (1 of 2) Never done    Respiratory Syncytial Virus (RSV) Pregnant or age 60 yrs+ (1 - 1-dose 60+ series) Never done    Pneumococcal 65+ years Vaccine (2 of 2 - PPSV23 or PCV20) 2020    COVID-19 Vaccine ( - - season) 2023    Flu vaccine (1) 2024       Past Surgical History:     Past Surgical History:   Procedure Laterality Date    APPENDECTOMY      CARPAL TUNNEL RELEASE  1963    HYSTERECTOMY (CERVIX STATUS UNKNOWN)      IR BIOPSY ABDOMINAL MASS Left 2024    Dr Tucker/[facility]    KNEE ARTHROSCOPY      [place]    US ABDOMINAL MASS BIOPSY

## 2024-08-22 NOTE — PATIENT INSTRUCTIONS
SURVEY:    You may be receiving a survey from [facility] regarding your visit today.    Please complete the survey to enable us to provide the highest quality of care to you and your family.    If you cannot score us a very good (5 Stars) on any question, please call the office to discuss how we could have made your experience a very good one.    Thank you.    Clinical Care Team: MD Niko Morrison LPN              Triage: Maru Dunaway CMA              Clerical Team: Maru Schroeder

## 2024-08-28 ENCOUNTER — HOSPITAL ENCOUNTER (OUTPATIENT)
Dept: INTERVENTIONAL RADIOLOGY/VASCULAR | Age: 84
Discharge: HOME | End: 2024-08-30
Payer: MEDICARE

## 2024-08-28 VITALS
DIASTOLIC BLOOD PRESSURE: 47 MMHG | SYSTOLIC BLOOD PRESSURE: 165 MMHG | TEMPERATURE: 97.2 F | OXYGEN SATURATION: 98 % | RESPIRATION RATE: 23 BRPM | HEART RATE: 71 BPM

## 2024-08-28 DIAGNOSIS — C56.3 MALIGNANT NEOPLASM OF BOTH OVARIES (HCC): ICD-10-CM

## 2024-08-28 DIAGNOSIS — C76.2 ABDOMINAL CARCINOMATOSIS (HCC): ICD-10-CM

## 2024-08-28 DIAGNOSIS — R19.00 PELVIC MASS IN FEMALE: ICD-10-CM

## 2024-08-28 PROCEDURE — 6360000002 HC RX W HCPCS: Performed by: RADIOLOGY

## 2024-08-28 PROCEDURE — C1788 PORT, INDWELLING, IMP: HCPCS

## 2024-08-28 PROCEDURE — 36561 INSERT TUNNELED CV CATH: CPT

## 2024-08-28 PROCEDURE — C1769 GUIDE WIRE: HCPCS

## 2024-08-28 PROCEDURE — 6360000002 HC RX W HCPCS

## 2024-08-28 PROCEDURE — C1894 INTRO/SHEATH, NON-LASER: HCPCS

## 2024-08-28 PROCEDURE — 2709999900 HC NON-CHARGEABLE SUPPLY

## 2024-08-28 PROCEDURE — 2500000003 HC RX 250 WO HCPCS

## 2024-08-28 PROCEDURE — 2580000003 HC RX 258: Performed by: RADIOLOGY

## 2024-08-28 PROCEDURE — 7100000010 HC PHASE II RECOVERY - FIRST 15 MIN

## 2024-08-28 PROCEDURE — 2500000003 HC RX 250 WO HCPCS: Performed by: RADIOLOGY

## 2024-08-28 PROCEDURE — 7100000011 HC PHASE II RECOVERY - ADDTL 15 MIN

## 2024-08-28 RX ORDER — CEFAZOLIN SODIUM IN 0.9 % NACL 2 G/100 ML
2000 PLASTIC BAG, INJECTION (ML) INTRAVENOUS ONCE
Status: COMPLETED | OUTPATIENT
Start: 2024-08-28 | End: 2024-08-28

## 2024-08-28 RX ORDER — SODIUM CHLORIDE 0.9 % (FLUSH) 0.9 %
SYRINGE (ML) INJECTION PRN
Status: COMPLETED | OUTPATIENT
Start: 2024-08-28 | End: 2024-08-28

## 2024-08-28 RX ORDER — BUPIVACAINE HYDROCHLORIDE 5 MG/ML
INJECTION, SOLUTION EPIDURAL; INTRACAUDAL PRN
Status: COMPLETED | OUTPATIENT
Start: 2024-08-28 | End: 2024-08-28

## 2024-08-28 RX ORDER — BUPIVACAINE HYDROCHLORIDE AND EPINEPHRINE 5; 5 MG/ML; UG/ML
INJECTION, SOLUTION EPIDURAL; INTRACAUDAL; PERINEURAL PRN
Status: COMPLETED | OUTPATIENT
Start: 2024-08-28 | End: 2024-08-28

## 2024-08-28 RX ORDER — HEPARIN 100 UNIT/ML
SYRINGE INTRAVENOUS PRN
Status: COMPLETED | OUTPATIENT
Start: 2024-08-28 | End: 2024-08-28

## 2024-08-28 RX ADMIN — HEPARIN 500 UNITS: 100 SYRINGE at 09:51

## 2024-08-28 RX ADMIN — CEFAZOLIN 1000 MG: 1 INJECTION, POWDER, FOR SOLUTION INTRAMUSCULAR; INTRAVENOUS; PARENTERAL at 09:46

## 2024-08-28 RX ADMIN — Medication 2000 MG: at 09:10

## 2024-08-28 RX ADMIN — FENTANYL CITRATE 25 MCG: 50 INJECTION INTRAMUSCULAR; INTRAVENOUS at 09:43

## 2024-08-28 RX ADMIN — FENTANYL CITRATE 25 MCG: 50 INJECTION INTRAMUSCULAR; INTRAVENOUS at 09:26

## 2024-08-28 RX ADMIN — SODIUM CHLORIDE, PRESERVATIVE FREE 10 ML: 5 INJECTION INTRAVENOUS at 09:51

## 2024-08-28 RX ADMIN — BUPIVACAINE HYDROCHLORIDE 1 ML: 5 INJECTION, SOLUTION EPIDURAL; INTRACAUDAL at 09:25

## 2024-08-28 RX ADMIN — BUPIVACAINE HYDROCHLORIDE AND EPINEPHRINE BITARTRATE 6 ML: 5; .0091 INJECTION, SOLUTION EPIDURAL; INTRACAUDAL; PERINEURAL at 09:43

## 2024-08-28 NOTE — OR NURSING
Patient assisted off of table and taken to post procedure area by chair. Report given to nursing staff.

## 2024-09-04 ENCOUNTER — TELEPHONE (OUTPATIENT)
Dept: ONCOLOGY | Age: 84
End: 2024-09-04

## 2024-09-04 ENCOUNTER — OFFICE VISIT (OUTPATIENT)
Dept: ONCOLOGY | Age: 84
End: 2024-09-04
Payer: MEDICARE

## 2024-09-04 VITALS
RESPIRATION RATE: 18 BRPM | BODY MASS INDEX: 28.53 KG/M2 | SYSTOLIC BLOOD PRESSURE: 150 MMHG | TEMPERATURE: 96.9 F | DIASTOLIC BLOOD PRESSURE: 72 MMHG | HEART RATE: 75 BPM | WEIGHT: 156 LBS

## 2024-09-04 DIAGNOSIS — R19.00 PELVIC MASS IN FEMALE: Primary | ICD-10-CM

## 2024-09-04 DIAGNOSIS — C56.3 MALIGNANT NEOPLASM OF BOTH OVARIES (HCC): ICD-10-CM

## 2024-09-04 DIAGNOSIS — C76.2 ABDOMINAL CARCINOMATOSIS (HCC): ICD-10-CM

## 2024-09-04 PROCEDURE — 1036F TOBACCO NON-USER: CPT | Performed by: INTERNAL MEDICINE

## 2024-09-04 PROCEDURE — G8400 PT W/DXA NO RESULTS DOC: HCPCS | Performed by: INTERNAL MEDICINE

## 2024-09-04 PROCEDURE — 1123F ACP DISCUSS/DSCN MKR DOCD: CPT | Performed by: INTERNAL MEDICINE

## 2024-09-04 PROCEDURE — G8427 DOCREV CUR MEDS BY ELIG CLIN: HCPCS | Performed by: INTERNAL MEDICINE

## 2024-09-04 PROCEDURE — 99211 OFF/OP EST MAY X REQ PHY/QHP: CPT | Performed by: INTERNAL MEDICINE

## 2024-09-04 PROCEDURE — G8417 CALC BMI ABV UP PARAM F/U: HCPCS | Performed by: INTERNAL MEDICINE

## 2024-09-04 PROCEDURE — 1090F PRES/ABSN URINE INCON ASSESS: CPT | Performed by: INTERNAL MEDICINE

## 2024-09-04 PROCEDURE — 99215 OFFICE O/P EST HI 40 MIN: CPT | Performed by: INTERNAL MEDICINE

## 2024-09-04 RX ORDER — SODIUM CHLORIDE 9 MG/ML
5-250 INJECTION, SOLUTION INTRAVENOUS PRN
Status: CANCELLED | OUTPATIENT
Start: 2024-09-06

## 2024-09-04 RX ORDER — ALBUTEROL SULFATE 90 UG/1
4 AEROSOL, METERED RESPIRATORY (INHALATION) PRN
OUTPATIENT
Start: 2024-09-27

## 2024-09-04 RX ORDER — FAMOTIDINE 10 MG/ML
20 INJECTION, SOLUTION INTRAVENOUS
OUTPATIENT
Start: 2024-09-27

## 2024-09-04 RX ORDER — EPINEPHRINE 1 MG/ML
0.3 INJECTION, SOLUTION, CONCENTRATE INTRAVENOUS PRN
OUTPATIENT
Start: 2024-09-27

## 2024-09-04 RX ORDER — SODIUM CHLORIDE 0.9 % (FLUSH) 0.9 %
5-40 SYRINGE (ML) INJECTION PRN
OUTPATIENT
Start: 2024-09-27

## 2024-09-04 RX ORDER — PROCHLORPERAZINE EDISYLATE 5 MG/ML
5 INJECTION INTRAMUSCULAR; INTRAVENOUS
OUTPATIENT
Start: 2024-09-06

## 2024-09-04 RX ORDER — HEPARIN SODIUM (PORCINE) LOCK FLUSH IV SOLN 100 UNIT/ML 100 UNIT/ML
500 SOLUTION INTRAVENOUS PRN
OUTPATIENT
Start: 2024-09-27

## 2024-09-04 RX ORDER — EPINEPHRINE 1 MG/ML
0.3 INJECTION, SOLUTION, CONCENTRATE INTRAVENOUS PRN
OUTPATIENT
Start: 2024-09-06

## 2024-09-04 RX ORDER — SODIUM CHLORIDE 0.9 % (FLUSH) 0.9 %
5-40 SYRINGE (ML) INJECTION PRN
Status: CANCELLED | OUTPATIENT
Start: 2024-09-06

## 2024-09-04 RX ORDER — PROCHLORPERAZINE EDISYLATE 5 MG/ML
5 INJECTION INTRAMUSCULAR; INTRAVENOUS
OUTPATIENT
Start: 2024-09-27

## 2024-09-04 RX ORDER — DEXAMETHASONE 4 MG/1
TABLET ORAL
Qty: 20 TABLET | Refills: 1 | Status: SHIPPED | OUTPATIENT
Start: 2024-09-04

## 2024-09-04 RX ORDER — FAMOTIDINE 10 MG/ML
20 INJECTION, SOLUTION INTRAVENOUS
OUTPATIENT
Start: 2024-09-06

## 2024-09-04 RX ORDER — SODIUM CHLORIDE 9 MG/ML
5-250 INJECTION, SOLUTION INTRAVENOUS PRN
OUTPATIENT
Start: 2024-09-06

## 2024-09-04 RX ORDER — PALONOSETRON 0.05 MG/ML
0.25 INJECTION, SOLUTION INTRAVENOUS ONCE
OUTPATIENT
Start: 2024-09-27 | End: 2024-09-27

## 2024-09-04 RX ORDER — ONDANSETRON 2 MG/ML
8 INJECTION INTRAMUSCULAR; INTRAVENOUS
OUTPATIENT
Start: 2024-09-27

## 2024-09-04 RX ORDER — DIPHENHYDRAMINE HYDROCHLORIDE 50 MG/ML
50 INJECTION INTRAMUSCULAR; INTRAVENOUS ONCE
Status: CANCELLED | OUTPATIENT
Start: 2024-09-06 | End: 2024-09-06

## 2024-09-04 RX ORDER — ONDANSETRON 2 MG/ML
8 INJECTION INTRAMUSCULAR; INTRAVENOUS
OUTPATIENT
Start: 2024-09-06

## 2024-09-04 RX ORDER — DIPHENHYDRAMINE HYDROCHLORIDE 50 MG/ML
50 INJECTION INTRAMUSCULAR; INTRAVENOUS ONCE
OUTPATIENT
Start: 2024-09-27 | End: 2024-09-27

## 2024-09-04 RX ORDER — FAMOTIDINE 10 MG/ML
20 INJECTION, SOLUTION INTRAVENOUS ONCE
OUTPATIENT
Start: 2024-09-27 | End: 2024-09-27

## 2024-09-04 RX ORDER — ALBUTEROL SULFATE 90 UG/1
4 AEROSOL, METERED RESPIRATORY (INHALATION) PRN
OUTPATIENT
Start: 2024-09-06

## 2024-09-04 RX ORDER — PALONOSETRON 0.05 MG/ML
0.25 INJECTION, SOLUTION INTRAVENOUS ONCE
Status: CANCELLED | OUTPATIENT
Start: 2024-09-06 | End: 2024-09-06

## 2024-09-04 RX ORDER — FAMOTIDINE 10 MG/ML
20 INJECTION, SOLUTION INTRAVENOUS ONCE
Status: CANCELLED | OUTPATIENT
Start: 2024-09-06 | End: 2024-09-06

## 2024-09-04 RX ORDER — DIPHENHYDRAMINE HYDROCHLORIDE 50 MG/ML
50 INJECTION INTRAMUSCULAR; INTRAVENOUS
OUTPATIENT
Start: 2024-09-06

## 2024-09-04 RX ORDER — ACETAMINOPHEN 325 MG/1
650 TABLET ORAL
OUTPATIENT
Start: 2024-09-06

## 2024-09-04 RX ORDER — DIPHENHYDRAMINE HYDROCHLORIDE 50 MG/ML
50 INJECTION INTRAMUSCULAR; INTRAVENOUS
OUTPATIENT
Start: 2024-09-27

## 2024-09-04 RX ORDER — HEPARIN SODIUM (PORCINE) LOCK FLUSH IV SOLN 100 UNIT/ML 100 UNIT/ML
500 SOLUTION INTRAVENOUS PRN
Status: CANCELLED | OUTPATIENT
Start: 2024-09-06

## 2024-09-04 RX ORDER — SODIUM CHLORIDE 9 MG/ML
INJECTION, SOLUTION INTRAVENOUS CONTINUOUS
OUTPATIENT
Start: 2024-09-27

## 2024-09-04 RX ORDER — SODIUM CHLORIDE 9 MG/ML
5-250 INJECTION, SOLUTION INTRAVENOUS PRN
OUTPATIENT
Start: 2024-09-27

## 2024-09-04 RX ORDER — ACETAMINOPHEN 325 MG/1
650 TABLET ORAL
OUTPATIENT
Start: 2024-09-27

## 2024-09-04 RX ORDER — SODIUM CHLORIDE 9 MG/ML
INJECTION, SOLUTION INTRAVENOUS CONTINUOUS
OUTPATIENT
Start: 2024-09-06

## 2024-09-04 NOTE — PROGRESS NOTES
DIAGNOSIS:   Serous ovarian cancer  Abdominal carcinomatosis  CURRENT THERAPY:  Starting neoadjuvant chemotherapy, started with single agent carboplatin in August/24  Chemotherapy changed to Taxol carboplatin starting September/24  Plan debulking surgery after chemotherapy  BRIEF CASE HISTORY:           Ms. Ruth Hutton is a very pleasant 84 y.o. female with history of multiple co morbidities as listed.  Patient is referred for evaluation and further management of pelvic mass.  Concerning for malignancy.  The patient had an episode of pelvic pain about 3 weeks back.  She had urinary urgency.  No hematuria.  She was evaluated with ultrasound of urinary bladder and CT scan of the pelvis.  She was found to have large both cystic and solid lobulated masses in the pelvis greater on the left than the right suspicious for ovarian carcinoma versus metastatic versus primary peritoneal carcinoma.  The patient had MRI done but no results yet.  Clinically she is doing fine.  No pain at the present time.  No GYN symptoms.  No active bleeding.  No spotting.  No urinary symptoms.  No GI symptoms.  No weight loss or decreased appetite.  No fever or night sweats.  Patient had history of hysterectomy and unilateral oophorectomy about 4 years ago.  Patient denies smoking or alcohol drinking.  The patient was seen and sent for GYN oncology consultation.  Decision was to proceed with neoadjuvant chemotherapy.  To be followed by surgical intervention after 3-4 cycles  The patient had significant trepidation about chemotherapy so we decided to give her single agent carboplatin with cycle 1 with a plan to transition to Taxol carboplatin with cycle 2.  INTERIM HISTORY  Patient comes in today for a follow-up, she handled the first cycle of chemotherapy exceptionally well.  There were no side effects.  No nausea or vomiting, no fever or chills, no

## 2024-09-04 NOTE — TELEPHONE ENCOUNTER
Name: Ruth Hutton  : 1940  MRN: [id number]    Oncology Navigation Follow-Up Note    Contact Type:  Medical Oncology    Notes: Met with Ruth and family in clinic.  Ruth had questions regarding treatment and activity.  Questions answered to patient's satisfaction.  Ruth denies further needs at this time. Encouraged Ruth to all with any questions or further needs.   24 treatment  24 follow up    Electronically signed by Keerthi Kurtz RN on 2024 at 10:01 AM

## 2024-09-06 ENCOUNTER — HOSPITAL ENCOUNTER (OUTPATIENT)
Dept: INFUSION THERAPY | Age: 84
Discharge: HOME | End: 2024-09-06
Payer: MEDICARE

## 2024-09-06 DIAGNOSIS — C56.3 MALIGNANT NEOPLASM OF BOTH OVARIES (HCC): ICD-10-CM

## 2024-09-06 DIAGNOSIS — C76.2 ABDOMINAL CARCINOMATOSIS (HCC): Primary | ICD-10-CM

## 2024-09-06 DIAGNOSIS — R19.00 PELVIC MASS IN FEMALE: ICD-10-CM

## 2024-09-06 LAB
ALBUMIN SERPL-MCNC: 4.3 G/DL (ref 3.5–5.2)
ALBUMIN/GLOB SERPL: 2.1 {RATIO} (ref 1–2.5)
ALP SERPL-CCNC: 79 U/L (ref 35–104)
ALT SERPL-CCNC: 9 U/L (ref 10–35)
ANION GAP SERPL CALCULATED.3IONS-SCNC: 11 MMOL/L (ref 9–16)
AST SERPL-CCNC: 14 U/L (ref 10–35)
BASOPHILS # BLD: 0 K/UL (ref 0–0.2)
BASOPHILS NFR BLD: 0 % (ref 0–2)
BILIRUB SERPL-MCNC: <0.2 MG/DL (ref 0–1.2)
BUN SERPL-MCNC: 18 MG/DL (ref 8–23)
BUN/CREAT SERPL: 26 (ref 9–20)
CALCIUM SERPL-MCNC: 9.6 MG/DL (ref 8.6–10.4)
CANCER AG125 SERPL-ACNC: 206 U/ML (ref 0–38)
CHLORIDE SERPL-SCNC: 102 MMOL/L (ref 98–107)
CO2 SERPL-SCNC: 26 MMOL/L (ref 20–31)
CREAT SERPL-MCNC: 0.7 MG/DL (ref 0.5–0.9)
EOSINOPHIL # BLD: 0 K/UL (ref 0–0.44)
EOSINOPHILS RELATIVE PERCENT: 0 % (ref 1–4)
ERYTHROCYTE [DISTWIDTH] IN BLOOD BY AUTOMATED COUNT: 13.5 % (ref 11.8–14.4)
GFR, ESTIMATED: 86 ML/MIN/1.73M2
GLUCOSE SERPL-MCNC: 134 MG/DL (ref 74–99)
HCT VFR BLD AUTO: 37 % (ref 36.3–47.1)
HGB BLD-MCNC: 12.5 G/DL (ref 11.9–15.1)
IMM GRANULOCYTES # BLD AUTO: 0 K/UL (ref 0–0.3)
IMM GRANULOCYTES NFR BLD: 0 %
LYMPHOCYTES NFR BLD: 0.39 K/UL (ref 1.1–3.7)
LYMPHOCYTES RELATIVE PERCENT: 4 % (ref 24–43)
MCH RBC QN AUTO: 29.3 PG (ref 25.2–33.5)
MCHC RBC AUTO-ENTMCNC: 33.8 G/DL (ref 28.4–34.8)
MCV RBC AUTO: 86.9 FL (ref 82.6–102.9)
MONOCYTES NFR BLD: 0.29 K/UL (ref 0.1–1.2)
MONOCYTES NFR BLD: 3 % (ref 3–12)
MORPHOLOGY: NORMAL
NEUTROPHILS NFR BLD: 93 % (ref 36–65)
NEUTS SEG NFR BLD: 9.02 K/UL (ref 1.5–8.1)
NRBC BLD-RTO: 0 PER 100 WBC
PLATELET # BLD AUTO: 225 K/UL (ref 138–453)
PMV BLD AUTO: 9.9 FL (ref 8.1–13.5)
POTASSIUM SERPL-SCNC: 3.7 MMOL/L (ref 3.7–5.3)
PROT SERPL-MCNC: 6.4 G/DL (ref 6.6–8.7)
RBC # BLD AUTO: 4.26 M/UL (ref 3.95–5.11)
SODIUM SERPL-SCNC: 139 MMOL/L (ref 136–145)
WBC OTHER # BLD: 9.7 K/UL (ref 3.5–11.3)

## 2024-09-06 PROCEDURE — 96417 CHEMO IV INFUS EACH ADDL SEQ: CPT

## 2024-09-06 PROCEDURE — 96375 TX/PRO/DX INJ NEW DRUG ADDON: CPT

## 2024-09-06 PROCEDURE — 2500000003 HC RX 250 WO HCPCS: Performed by: INTERNAL MEDICINE

## 2024-09-06 PROCEDURE — 6360000002 HC RX W HCPCS: Performed by: INTERNAL MEDICINE

## 2024-09-06 PROCEDURE — 96367 TX/PROPH/DG ADDL SEQ IV INF: CPT

## 2024-09-06 PROCEDURE — 86304 IMMUNOASSAY TUMOR CA 125: CPT

## 2024-09-06 PROCEDURE — 2580000003 HC RX 258: Performed by: INTERNAL MEDICINE

## 2024-09-06 PROCEDURE — 36591 DRAW BLOOD OFF VENOUS DEVICE: CPT

## 2024-09-06 PROCEDURE — 96415 CHEMO IV INFUSION ADDL HR: CPT

## 2024-09-06 PROCEDURE — 96413 CHEMO IV INFUSION 1 HR: CPT

## 2024-09-06 PROCEDURE — 80053 COMPREHEN METABOLIC PANEL: CPT

## 2024-09-06 PROCEDURE — 85025 COMPLETE CBC W/AUTO DIFF WBC: CPT

## 2024-09-06 RX ORDER — DIPHENHYDRAMINE HYDROCHLORIDE 50 MG/ML
50 INJECTION INTRAMUSCULAR; INTRAVENOUS ONCE
Status: COMPLETED | OUTPATIENT
Start: 2024-09-06 | End: 2024-09-06

## 2024-09-06 RX ORDER — PALONOSETRON 0.05 MG/ML
0.25 INJECTION, SOLUTION INTRAVENOUS ONCE
Status: COMPLETED | OUTPATIENT
Start: 2024-09-06 | End: 2024-09-06

## 2024-09-06 RX ORDER — DEXAMETHASONE SODIUM PHOSPHATE 10 MG/ML
10 INJECTION, SOLUTION INTRAMUSCULAR; INTRAVENOUS ONCE
Status: COMPLETED | OUTPATIENT
Start: 2024-09-06 | End: 2024-09-06

## 2024-09-06 RX ORDER — SODIUM CHLORIDE 9 MG/ML
5-250 INJECTION, SOLUTION INTRAVENOUS PRN
Status: DISCONTINUED | OUTPATIENT
Start: 2024-09-06 | End: 2024-09-07 | Stop reason: HOSPADM

## 2024-09-06 RX ORDER — FAMOTIDINE 10 MG/ML
20 INJECTION, SOLUTION INTRAVENOUS ONCE
Status: COMPLETED | OUTPATIENT
Start: 2024-09-06 | End: 2024-09-06

## 2024-09-06 RX ORDER — SODIUM CHLORIDE 0.9 % (FLUSH) 0.9 %
5-40 SYRINGE (ML) INJECTION PRN
Status: DISCONTINUED | OUTPATIENT
Start: 2024-09-06 | End: 2024-09-07 | Stop reason: HOSPADM

## 2024-09-06 RX ORDER — HEPARIN 100 UNIT/ML
500 SYRINGE INTRAVENOUS PRN
Status: DISCONTINUED | OUTPATIENT
Start: 2024-09-06 | End: 2024-09-07 | Stop reason: HOSPADM

## 2024-09-06 RX ADMIN — DIPHENHYDRAMINE HYDROCHLORIDE 50 MG: 50 INJECTION INTRAMUSCULAR; INTRAVENOUS at 10:30

## 2024-09-06 RX ADMIN — PACLITAXEL 306 MG: 6 INJECTION, SOLUTION INTRAVENOUS at 11:11

## 2024-09-06 RX ADMIN — SODIUM CHLORIDE, PRESERVATIVE FREE 10 ML: 5 INJECTION INTRAVENOUS at 09:18

## 2024-09-06 RX ADMIN — SODIUM CHLORIDE, PRESERVATIVE FREE 10 ML: 5 INJECTION INTRAVENOUS at 14:45

## 2024-09-06 RX ADMIN — SODIUM CHLORIDE 100 ML/HR: 9 INJECTION, SOLUTION INTRAVENOUS at 10:24

## 2024-09-06 RX ADMIN — SODIUM CHLORIDE, PRESERVATIVE FREE 10 ML: 5 INJECTION INTRAVENOUS at 14:44

## 2024-09-06 RX ADMIN — SODIUM CHLORIDE 150 MG: 9 INJECTION, SOLUTION INTRAVENOUS at 10:41

## 2024-09-06 RX ADMIN — HEPARIN 500 UNITS: 100 SYRINGE at 09:19

## 2024-09-06 RX ADMIN — DEXAMETHASONE SODIUM PHOSPHATE 10 MG: 10 INJECTION INTRAMUSCULAR; INTRAVENOUS at 10:29

## 2024-09-06 RX ADMIN — FAMOTIDINE 20 MG: 10 INJECTION, SOLUTION INTRAVENOUS at 10:30

## 2024-09-06 RX ADMIN — SODIUM CHLORIDE, PRESERVATIVE FREE 10 ML: 5 INJECTION INTRAVENOUS at 09:19

## 2024-09-06 RX ADMIN — PALONOSETRON 0.25 MG: 0.05 INJECTION, SOLUTION INTRAVENOUS at 10:30

## 2024-09-06 RX ADMIN — CARBOPLATIN 400 MG: 10 INJECTION, SOLUTION INTRAVENOUS at 14:12

## 2024-09-12 ENCOUNTER — OFFICE VISIT (OUTPATIENT)
Dept: FAMILY MEDICINE CLINIC | Age: 84
End: 2024-09-12

## 2024-09-12 VITALS
BODY MASS INDEX: 27.62 KG/M2 | HEART RATE: 74 BPM | DIASTOLIC BLOOD PRESSURE: 62 MMHG | WEIGHT: 151 LBS | OXYGEN SATURATION: 98 % | SYSTOLIC BLOOD PRESSURE: 110 MMHG

## 2024-09-12 DIAGNOSIS — I10 PRIMARY HYPERTENSION: Primary | ICD-10-CM

## 2024-09-12 RX ORDER — HYDROCHLOROTHIAZIDE 25 MG/1
25 TABLET ORAL DAILY
Qty: 30 TABLET | Refills: 5 | Status: SHIPPED | OUTPATIENT
Start: 2024-09-12

## 2024-09-12 ASSESSMENT — ENCOUNTER SYMPTOMS
EYE DISCHARGE: 0
EYE REDNESS: 0
SHORTNESS OF BREATH: 0
VOMITING: 0
DIARRHEA: 0

## 2024-09-25 ENCOUNTER — OFFICE VISIT (OUTPATIENT)
Dept: ONCOLOGY | Age: 84
End: 2024-09-25
Payer: MEDICARE

## 2024-09-25 ENCOUNTER — TELEPHONE (OUTPATIENT)
Dept: ONCOLOGY | Age: 84
End: 2024-09-25

## 2024-09-25 VITALS
TEMPERATURE: 97.9 F | WEIGHT: 156 LBS | SYSTOLIC BLOOD PRESSURE: 154 MMHG | BODY MASS INDEX: 28.53 KG/M2 | RESPIRATION RATE: 18 BRPM | DIASTOLIC BLOOD PRESSURE: 72 MMHG | HEART RATE: 78 BPM

## 2024-09-25 DIAGNOSIS — C76.2 ABDOMINAL CARCINOMATOSIS (HCC): ICD-10-CM

## 2024-09-25 DIAGNOSIS — C56.3 MALIGNANT NEOPLASM OF BOTH OVARIES (HCC): Primary | ICD-10-CM

## 2024-09-25 PROCEDURE — G8427 DOCREV CUR MEDS BY ELIG CLIN: HCPCS | Performed by: INTERNAL MEDICINE

## 2024-09-25 PROCEDURE — 1123F ACP DISCUSS/DSCN MKR DOCD: CPT | Performed by: INTERNAL MEDICINE

## 2024-09-25 PROCEDURE — G8417 CALC BMI ABV UP PARAM F/U: HCPCS | Performed by: INTERNAL MEDICINE

## 2024-09-25 PROCEDURE — 1090F PRES/ABSN URINE INCON ASSESS: CPT | Performed by: INTERNAL MEDICINE

## 2024-09-25 PROCEDURE — 99214 OFFICE O/P EST MOD 30 MIN: CPT | Performed by: INTERNAL MEDICINE

## 2024-09-25 PROCEDURE — G8400 PT W/DXA NO RESULTS DOC: HCPCS | Performed by: INTERNAL MEDICINE

## 2024-09-25 PROCEDURE — 1036F TOBACCO NON-USER: CPT | Performed by: INTERNAL MEDICINE

## 2024-09-25 RX ORDER — SODIUM CHLORIDE 9 MG/ML
INJECTION, SOLUTION INTRAVENOUS CONTINUOUS
OUTPATIENT
Start: 2024-10-18

## 2024-09-25 RX ORDER — SODIUM CHLORIDE 9 MG/ML
5-250 INJECTION, SOLUTION INTRAVENOUS PRN
OUTPATIENT
Start: 2024-10-18

## 2024-09-25 RX ORDER — DIPHENHYDRAMINE HYDROCHLORIDE 50 MG/ML
50 INJECTION INTRAMUSCULAR; INTRAVENOUS
OUTPATIENT
Start: 2024-10-18

## 2024-09-25 RX ORDER — PROCHLORPERAZINE EDISYLATE 5 MG/ML
5 INJECTION INTRAMUSCULAR; INTRAVENOUS
OUTPATIENT
Start: 2024-10-18

## 2024-09-25 RX ORDER — PALONOSETRON 0.05 MG/ML
0.25 INJECTION, SOLUTION INTRAVENOUS ONCE
OUTPATIENT
Start: 2024-10-18 | End: 2024-10-18

## 2024-09-25 RX ORDER — HEPARIN SODIUM (PORCINE) LOCK FLUSH IV SOLN 100 UNIT/ML 100 UNIT/ML
500 SOLUTION INTRAVENOUS PRN
OUTPATIENT
Start: 2024-10-18

## 2024-09-25 RX ORDER — ACETAMINOPHEN 325 MG/1
650 TABLET ORAL
OUTPATIENT
Start: 2024-10-18

## 2024-09-25 RX ORDER — DIPHENHYDRAMINE HYDROCHLORIDE 50 MG/ML
50 INJECTION INTRAMUSCULAR; INTRAVENOUS ONCE
OUTPATIENT
Start: 2024-10-18 | End: 2024-10-18

## 2024-09-25 RX ORDER — EPINEPHRINE 1 MG/ML
0.3 INJECTION, SOLUTION, CONCENTRATE INTRAVENOUS PRN
OUTPATIENT
Start: 2024-10-18

## 2024-09-25 RX ORDER — ALBUTEROL SULFATE 90 UG/1
4 INHALANT RESPIRATORY (INHALATION) PRN
OUTPATIENT
Start: 2024-10-18

## 2024-09-25 RX ORDER — FAMOTIDINE 10 MG/ML
20 INJECTION, SOLUTION INTRAVENOUS
OUTPATIENT
Start: 2024-10-18

## 2024-09-25 RX ORDER — ONDANSETRON 2 MG/ML
8 INJECTION INTRAMUSCULAR; INTRAVENOUS
OUTPATIENT
Start: 2024-10-18

## 2024-09-25 RX ORDER — FAMOTIDINE 10 MG/ML
20 INJECTION, SOLUTION INTRAVENOUS ONCE
OUTPATIENT
Start: 2024-10-18 | End: 2024-10-18

## 2024-09-25 RX ORDER — SODIUM CHLORIDE 0.9 % (FLUSH) 0.9 %
5-40 SYRINGE (ML) INJECTION PRN
OUTPATIENT
Start: 2024-10-18

## 2024-09-27 ENCOUNTER — HOSPITAL ENCOUNTER (OUTPATIENT)
Dept: INFUSION THERAPY | Age: 84
Discharge: HOME | End: 2024-09-27
Payer: MEDICARE

## 2024-09-27 VITALS
RESPIRATION RATE: 18 BRPM | DIASTOLIC BLOOD PRESSURE: 74 MMHG | BODY MASS INDEX: 28.34 KG/M2 | HEIGHT: 62 IN | TEMPERATURE: 96.8 F | SYSTOLIC BLOOD PRESSURE: 144 MMHG | WEIGHT: 154 LBS | HEART RATE: 72 BPM

## 2024-09-27 DIAGNOSIS — C76.2 ABDOMINAL CARCINOMATOSIS (HCC): ICD-10-CM

## 2024-09-27 DIAGNOSIS — C56.3 MALIGNANT NEOPLASM OF BOTH OVARIES (HCC): Primary | ICD-10-CM

## 2024-09-27 DIAGNOSIS — R19.00 PELVIC MASS IN FEMALE: ICD-10-CM

## 2024-09-27 LAB
ALBUMIN SERPL-MCNC: 4.1 G/DL (ref 3.5–5.2)
ALBUMIN/GLOB SERPL: 1.8 {RATIO} (ref 1–2.5)
ALP SERPL-CCNC: 69 U/L (ref 35–104)
ALT SERPL-CCNC: 9 U/L (ref 10–35)
ANION GAP SERPL CALCULATED.3IONS-SCNC: 9 MMOL/L (ref 9–16)
AST SERPL-CCNC: 14 U/L (ref 10–35)
BASOPHILS # BLD: 0.03 K/UL (ref 0–0.2)
BASOPHILS NFR BLD: 1 % (ref 0–2)
BILIRUB SERPL-MCNC: <0.2 MG/DL (ref 0–1.2)
BUN SERPL-MCNC: 13 MG/DL (ref 8–23)
BUN/CREAT SERPL: 19 (ref 9–20)
CALCIUM SERPL-MCNC: 9.2 MG/DL (ref 8.6–10.4)
CHLORIDE SERPL-SCNC: 105 MMOL/L (ref 98–107)
CO2 SERPL-SCNC: 26 MMOL/L (ref 20–31)
CREAT SERPL-MCNC: 0.7 MG/DL (ref 0.5–0.9)
EOSINOPHIL # BLD: 0.03 K/UL (ref 0–0.44)
EOSINOPHILS RELATIVE PERCENT: 1 % (ref 1–4)
ERYTHROCYTE [DISTWIDTH] IN BLOOD BY AUTOMATED COUNT: 14.6 % (ref 11.8–14.4)
GFR, ESTIMATED: 86 ML/MIN/1.73M2
GLUCOSE SERPL-MCNC: 91 MG/DL (ref 74–99)
HCT VFR BLD AUTO: 35.9 % (ref 36.3–47.1)
HGB BLD-MCNC: 12.2 G/DL (ref 11.9–15.1)
IMM GRANULOCYTES # BLD AUTO: 0.03 K/UL (ref 0–0.3)
IMM GRANULOCYTES NFR BLD: 1 %
LYMPHOCYTES NFR BLD: 0.89 K/UL (ref 1.1–3.7)
LYMPHOCYTES RELATIVE PERCENT: 20 % (ref 24–43)
MCH RBC QN AUTO: 30.1 PG (ref 25.2–33.5)
MCHC RBC AUTO-ENTMCNC: 34 G/DL (ref 28.4–34.8)
MCV RBC AUTO: 88.6 FL (ref 82.6–102.9)
MONOCYTES NFR BLD: 0.56 K/UL (ref 0.1–1.2)
MONOCYTES NFR BLD: 12 % (ref 3–12)
NEUTROPHILS NFR BLD: 65 % (ref 36–65)
NEUTS SEG NFR BLD: 2.96 K/UL (ref 1.5–8.1)
NRBC BLD-RTO: 0 PER 100 WBC
PLATELET # BLD AUTO: 252 K/UL (ref 138–453)
PMV BLD AUTO: 8.9 FL (ref 8.1–13.5)
POTASSIUM SERPL-SCNC: 4.3 MMOL/L (ref 3.7–5.3)
PROT SERPL-MCNC: 6.3 G/DL (ref 6.6–8.7)
RBC # BLD AUTO: 4.05 M/UL (ref 3.95–5.11)
SODIUM SERPL-SCNC: 140 MMOL/L (ref 136–145)
WBC OTHER # BLD: 4.5 K/UL (ref 3.5–11.3)

## 2024-09-27 PROCEDURE — 96375 TX/PRO/DX INJ NEW DRUG ADDON: CPT

## 2024-09-27 PROCEDURE — 96417 CHEMO IV INFUS EACH ADDL SEQ: CPT

## 2024-09-27 PROCEDURE — 6360000002 HC RX W HCPCS: Performed by: INTERNAL MEDICINE

## 2024-09-27 PROCEDURE — 96415 CHEMO IV INFUSION ADDL HR: CPT

## 2024-09-27 PROCEDURE — 85025 COMPLETE CBC W/AUTO DIFF WBC: CPT

## 2024-09-27 PROCEDURE — 96413 CHEMO IV INFUSION 1 HR: CPT

## 2024-09-27 PROCEDURE — 36591 DRAW BLOOD OFF VENOUS DEVICE: CPT

## 2024-09-27 PROCEDURE — 2500000003 HC RX 250 WO HCPCS: Performed by: INTERNAL MEDICINE

## 2024-09-27 PROCEDURE — 2580000003 HC RX 258: Performed by: INTERNAL MEDICINE

## 2024-09-27 PROCEDURE — 80053 COMPREHEN METABOLIC PANEL: CPT

## 2024-09-27 PROCEDURE — 86304 IMMUNOASSAY TUMOR CA 125: CPT

## 2024-09-27 PROCEDURE — 96367 TX/PROPH/DG ADDL SEQ IV INF: CPT

## 2024-09-27 RX ORDER — PALONOSETRON 0.05 MG/ML
0.25 INJECTION, SOLUTION INTRAVENOUS ONCE
Status: COMPLETED | OUTPATIENT
Start: 2024-09-27 | End: 2024-09-27

## 2024-09-27 RX ORDER — SODIUM CHLORIDE 0.9 % (FLUSH) 0.9 %
5-40 SYRINGE (ML) INJECTION PRN
Status: DISCONTINUED | OUTPATIENT
Start: 2024-09-27 | End: 2024-09-28 | Stop reason: HOSPADM

## 2024-09-27 RX ORDER — FAMOTIDINE 10 MG/ML
20 INJECTION, SOLUTION INTRAVENOUS ONCE
Status: COMPLETED | OUTPATIENT
Start: 2024-09-27 | End: 2024-09-27

## 2024-09-27 RX ORDER — DIPHENHYDRAMINE HYDROCHLORIDE 50 MG/ML
50 INJECTION INTRAMUSCULAR; INTRAVENOUS ONCE
Status: COMPLETED | OUTPATIENT
Start: 2024-09-27 | End: 2024-09-27

## 2024-09-27 RX ORDER — SODIUM CHLORIDE 9 MG/ML
5-250 INJECTION, SOLUTION INTRAVENOUS PRN
Status: DISCONTINUED | OUTPATIENT
Start: 2024-09-27 | End: 2024-09-28 | Stop reason: HOSPADM

## 2024-09-27 RX ORDER — HEPARIN 100 UNIT/ML
500 SYRINGE INTRAVENOUS PRN
Status: DISCONTINUED | OUTPATIENT
Start: 2024-09-27 | End: 2024-09-28 | Stop reason: HOSPADM

## 2024-09-27 RX ADMIN — HEPARIN 500 UNITS: 100 SYRINGE at 15:11

## 2024-09-27 RX ADMIN — PALONOSETRON 0.25 MG: 0.05 INJECTION, SOLUTION INTRAVENOUS at 10:32

## 2024-09-27 RX ADMIN — SODIUM CHLORIDE, PRESERVATIVE FREE 10 ML: 5 INJECTION INTRAVENOUS at 09:05

## 2024-09-27 RX ADMIN — SODIUM CHLORIDE, PRESERVATIVE FREE 10 ML: 5 INJECTION INTRAVENOUS at 15:10

## 2024-09-27 RX ADMIN — DIPHENHYDRAMINE HYDROCHLORIDE 50 MG: 50 INJECTION, SOLUTION INTRAMUSCULAR; INTRAVENOUS at 10:38

## 2024-09-27 RX ADMIN — PACLITAXEL 306 MG: 6 INJECTION, SOLUTION INTRAVENOUS at 11:32

## 2024-09-27 RX ADMIN — SODIUM CHLORIDE, PRESERVATIVE FREE 10 ML: 5 INJECTION INTRAVENOUS at 10:34

## 2024-09-27 RX ADMIN — SODIUM CHLORIDE, PRESERVATIVE FREE 10 ML: 5 INJECTION INTRAVENOUS at 15:11

## 2024-09-27 RX ADMIN — DEXAMETHASONE SODIUM PHOSPHATE 20 MG: 4 INJECTION, SOLUTION INTRAMUSCULAR; INTRAVENOUS at 10:46

## 2024-09-27 RX ADMIN — CARBOPLATIN 400 MG: 10 INJECTION INTRAVENOUS at 14:38

## 2024-09-27 RX ADMIN — SODIUM CHLORIDE, PRESERVATIVE FREE 10 ML: 5 INJECTION INTRAVENOUS at 09:10

## 2024-09-27 RX ADMIN — SODIUM CHLORIDE 200 ML/HR: 9 INJECTION, SOLUTION INTRAVENOUS at 10:31

## 2024-09-27 RX ADMIN — SODIUM CHLORIDE 150 MG: 9 INJECTION, SOLUTION INTRAVENOUS at 11:06

## 2024-09-27 RX ADMIN — FAMOTIDINE 20 MG: 10 INJECTION, SOLUTION INTRAVENOUS at 10:34

## 2024-09-27 NOTE — PROGRESS NOTES
Called Dr. Rosas to inform him pt here for her Cycle 3 of Carbo/Taxol and she forgot to take the Dexamethasone 20mg the night before.  orders to proceed with treatment but give patient Dexamethasone 20mg IV today once as a premedication. No other orders at this time.

## 2024-09-28 LAB — CANCER AG125 SERPL-ACNC: 144 U/ML (ref 0–38)

## 2024-10-16 ENCOUNTER — OFFICE VISIT (OUTPATIENT)
Dept: ONCOLOGY | Age: 84
End: 2024-10-16
Payer: MEDICARE

## 2024-10-16 VITALS
HEART RATE: 80 BPM | BODY MASS INDEX: 27.98 KG/M2 | WEIGHT: 153 LBS | TEMPERATURE: 97.9 F | DIASTOLIC BLOOD PRESSURE: 79 MMHG | RESPIRATION RATE: 18 BRPM | SYSTOLIC BLOOD PRESSURE: 148 MMHG

## 2024-10-16 DIAGNOSIS — C56.3 MALIGNANT NEOPLASM OF BOTH OVARIES (HCC): Primary | ICD-10-CM

## 2024-10-16 PROCEDURE — 1090F PRES/ABSN URINE INCON ASSESS: CPT | Performed by: INTERNAL MEDICINE

## 2024-10-16 PROCEDURE — G8400 PT W/DXA NO RESULTS DOC: HCPCS | Performed by: INTERNAL MEDICINE

## 2024-10-16 PROCEDURE — G8484 FLU IMMUNIZE NO ADMIN: HCPCS | Performed by: INTERNAL MEDICINE

## 2024-10-16 PROCEDURE — G8427 DOCREV CUR MEDS BY ELIG CLIN: HCPCS | Performed by: INTERNAL MEDICINE

## 2024-10-16 PROCEDURE — 1036F TOBACCO NON-USER: CPT | Performed by: INTERNAL MEDICINE

## 2024-10-16 PROCEDURE — 99214 OFFICE O/P EST MOD 30 MIN: CPT | Performed by: INTERNAL MEDICINE

## 2024-10-16 PROCEDURE — G8417 CALC BMI ABV UP PARAM F/U: HCPCS | Performed by: INTERNAL MEDICINE

## 2024-10-16 PROCEDURE — 99211 OFF/OP EST MAY X REQ PHY/QHP: CPT | Performed by: INTERNAL MEDICINE

## 2024-10-16 PROCEDURE — 1123F ACP DISCUSS/DSCN MKR DOCD: CPT | Performed by: INTERNAL MEDICINE

## 2024-10-16 NOTE — PROGRESS NOTES
now.  Plan debulking surgery in November  We will complete 6 cycles of chemotherapy after recovery from surgery    The patient had a CT scan done last week by the GYN oncologist and will follow-up on the result    Rell Harp MD  Hematologist/Medical Oncologist  [facility]                         This note is created with the assistance of a speech recognition program.  While intending to generate a document that actually reflects the content of the visit, the document can still have some errors including those of syntax and sound a like substitutions which may escape proof reading.  It such instances, actual meaning can be extrapolated by contextual diversion.

## 2024-10-18 ENCOUNTER — HOSPITAL ENCOUNTER (OUTPATIENT)
Dept: INFUSION THERAPY | Age: 84
Discharge: HOME | End: 2024-10-18
Payer: MEDICARE

## 2024-10-18 VITALS
HEIGHT: 62 IN | DIASTOLIC BLOOD PRESSURE: 73 MMHG | SYSTOLIC BLOOD PRESSURE: 156 MMHG | BODY MASS INDEX: 27.97 KG/M2 | TEMPERATURE: 97.4 F | WEIGHT: 152 LBS | RESPIRATION RATE: 18 BRPM | HEART RATE: 85 BPM

## 2024-10-18 DIAGNOSIS — C56.3 MALIGNANT NEOPLASM OF BOTH OVARIES (HCC): Primary | ICD-10-CM

## 2024-10-18 DIAGNOSIS — R19.00 PELVIC MASS IN FEMALE: ICD-10-CM

## 2024-10-18 DIAGNOSIS — C76.2 ABDOMINAL CARCINOMATOSIS (HCC): ICD-10-CM

## 2024-10-18 LAB
ALBUMIN SERPL-MCNC: 4.3 G/DL (ref 3.5–5.2)
ALBUMIN/GLOB SERPL: 1.6 {RATIO} (ref 1–2.5)
ALP SERPL-CCNC: 69 U/L (ref 35–104)
ALT SERPL-CCNC: 11 U/L (ref 10–35)
ANION GAP SERPL CALCULATED.3IONS-SCNC: 11 MMOL/L (ref 9–16)
AST SERPL-CCNC: 16 U/L (ref 10–35)
BASOPHILS # BLD: <0.03 K/UL (ref 0–0.2)
BASOPHILS NFR BLD: 0 % (ref 0–2)
BILIRUB SERPL-MCNC: <0.2 MG/DL (ref 0–1.2)
BUN SERPL-MCNC: 19 MG/DL (ref 8–23)
BUN/CREAT SERPL: 27 (ref 9–20)
CALCIUM SERPL-MCNC: 9.8 MG/DL (ref 8.6–10.4)
CANCER AG125 SERPL-ACNC: 106 U/ML (ref 0–38)
CHLORIDE SERPL-SCNC: 102 MMOL/L (ref 98–107)
CO2 SERPL-SCNC: 27 MMOL/L (ref 20–31)
CREAT SERPL-MCNC: 0.7 MG/DL (ref 0.5–0.9)
EOSINOPHIL # BLD: <0.03 K/UL (ref 0–0.44)
EOSINOPHILS RELATIVE PERCENT: 0 % (ref 1–4)
ERYTHROCYTE [DISTWIDTH] IN BLOOD BY AUTOMATED COUNT: 14.8 % (ref 11.8–14.4)
GFR, ESTIMATED: 86 ML/MIN/1.73M2
GLUCOSE SERPL-MCNC: 122 MG/DL (ref 74–99)
HCT VFR BLD AUTO: 37.5 % (ref 36.3–47.1)
HGB BLD-MCNC: 12.8 G/DL (ref 11.9–15.1)
IMM GRANULOCYTES # BLD AUTO: 0.11 K/UL (ref 0–0.3)
IMM GRANULOCYTES NFR BLD: 1 %
LYMPHOCYTES NFR BLD: 0.69 K/UL (ref 1.1–3.7)
LYMPHOCYTES RELATIVE PERCENT: 6 % (ref 24–43)
MCH RBC QN AUTO: 30.3 PG (ref 25.2–33.5)
MCHC RBC AUTO-ENTMCNC: 34.1 G/DL (ref 28.4–34.8)
MCV RBC AUTO: 88.7 FL (ref 82.6–102.9)
MONOCYTES NFR BLD: 0.92 K/UL (ref 0.1–1.2)
MONOCYTES NFR BLD: 7 % (ref 3–12)
NEUTROPHILS NFR BLD: 86 % (ref 36–65)
NEUTS SEG NFR BLD: 10.84 K/UL (ref 1.5–8.1)
NRBC BLD-RTO: 0 PER 100 WBC
PLATELET # BLD AUTO: 230 K/UL (ref 138–453)
PMV BLD AUTO: 9.4 FL (ref 8.1–13.5)
POTASSIUM SERPL-SCNC: 3.8 MMOL/L (ref 3.7–5.3)
PROT SERPL-MCNC: 6.9 G/DL (ref 6.6–8.7)
RBC # BLD AUTO: 4.23 M/UL (ref 3.95–5.11)
SODIUM SERPL-SCNC: 140 MMOL/L (ref 136–145)
WBC OTHER # BLD: 12.6 K/UL (ref 3.5–11.3)

## 2024-10-18 PROCEDURE — 96367 TX/PROPH/DG ADDL SEQ IV INF: CPT

## 2024-10-18 PROCEDURE — 96375 TX/PRO/DX INJ NEW DRUG ADDON: CPT

## 2024-10-18 PROCEDURE — 80053 COMPREHEN METABOLIC PANEL: CPT

## 2024-10-18 PROCEDURE — 6360000002 HC RX W HCPCS: Performed by: INTERNAL MEDICINE

## 2024-10-18 PROCEDURE — 86304 IMMUNOASSAY TUMOR CA 125: CPT

## 2024-10-18 PROCEDURE — 85025 COMPLETE CBC W/AUTO DIFF WBC: CPT

## 2024-10-18 PROCEDURE — 96415 CHEMO IV INFUSION ADDL HR: CPT

## 2024-10-18 PROCEDURE — 36591 DRAW BLOOD OFF VENOUS DEVICE: CPT

## 2024-10-18 PROCEDURE — 96413 CHEMO IV INFUSION 1 HR: CPT

## 2024-10-18 PROCEDURE — 96417 CHEMO IV INFUS EACH ADDL SEQ: CPT

## 2024-10-18 PROCEDURE — 2580000003 HC RX 258: Performed by: INTERNAL MEDICINE

## 2024-10-18 PROCEDURE — 2500000003 HC RX 250 WO HCPCS: Performed by: INTERNAL MEDICINE

## 2024-10-18 RX ORDER — DEXAMETHASONE SODIUM PHOSPHATE 10 MG/ML
10 INJECTION, SOLUTION INTRAMUSCULAR; INTRAVENOUS ONCE
Status: COMPLETED | OUTPATIENT
Start: 2024-10-18 | End: 2024-10-18

## 2024-10-18 RX ORDER — SODIUM CHLORIDE 9 MG/ML
5-250 INJECTION, SOLUTION INTRAVENOUS PRN
Status: DISCONTINUED | OUTPATIENT
Start: 2024-10-18 | End: 2024-10-19 | Stop reason: HOSPADM

## 2024-10-18 RX ORDER — SODIUM CHLORIDE 0.9 % (FLUSH) 0.9 %
5-40 SYRINGE (ML) INJECTION PRN
Status: DISCONTINUED | OUTPATIENT
Start: 2024-10-18 | End: 2024-10-19 | Stop reason: HOSPADM

## 2024-10-18 RX ORDER — DIPHENHYDRAMINE HYDROCHLORIDE 50 MG/ML
50 INJECTION INTRAMUSCULAR; INTRAVENOUS ONCE
Status: COMPLETED | OUTPATIENT
Start: 2024-10-18 | End: 2024-10-18

## 2024-10-18 RX ORDER — HEPARIN 100 UNIT/ML
500 SYRINGE INTRAVENOUS PRN
Status: DISCONTINUED | OUTPATIENT
Start: 2024-10-18 | End: 2024-10-19 | Stop reason: HOSPADM

## 2024-10-18 RX ORDER — FAMOTIDINE 10 MG/ML
20 INJECTION, SOLUTION INTRAVENOUS ONCE
Status: COMPLETED | OUTPATIENT
Start: 2024-10-18 | End: 2024-10-18

## 2024-10-18 RX ORDER — PALONOSETRON 0.05 MG/ML
0.25 INJECTION, SOLUTION INTRAVENOUS ONCE
Status: COMPLETED | OUTPATIENT
Start: 2024-10-18 | End: 2024-10-18

## 2024-10-18 RX ADMIN — PACLITAXEL 306 MG: 6 INJECTION, SOLUTION, CONCENTRATE INTRAVENOUS at 10:45

## 2024-10-18 RX ADMIN — HEPARIN 500 UNITS: 100 SYRINGE at 14:42

## 2024-10-18 RX ADMIN — PALONOSETRON 0.25 MG: 0.05 INJECTION, SOLUTION INTRAVENOUS at 10:01

## 2024-10-18 RX ADMIN — DIPHENHYDRAMINE HYDROCHLORIDE 50 MG: 50 INJECTION INTRAMUSCULAR; INTRAVENOUS at 10:01

## 2024-10-18 RX ADMIN — SODIUM CHLORIDE 150 MG: 9 INJECTION, SOLUTION INTRAVENOUS at 10:12

## 2024-10-18 RX ADMIN — SODIUM CHLORIDE, PRESERVATIVE FREE 10 ML: 5 INJECTION INTRAVENOUS at 09:07

## 2024-10-18 RX ADMIN — SODIUM CHLORIDE, PRESERVATIVE FREE 10 ML: 5 INJECTION INTRAVENOUS at 14:41

## 2024-10-18 RX ADMIN — SODIUM CHLORIDE, PRESERVATIVE FREE 10 ML: 5 INJECTION INTRAVENOUS at 14:42

## 2024-10-18 RX ADMIN — SODIUM CHLORIDE 100 ML/HR: 9 INJECTION, SOLUTION INTRAVENOUS at 10:00

## 2024-10-18 RX ADMIN — FAMOTIDINE 20 MG: 10 INJECTION, SOLUTION INTRAVENOUS at 10:00

## 2024-10-18 RX ADMIN — SODIUM CHLORIDE, PRESERVATIVE FREE 10 ML: 5 INJECTION INTRAVENOUS at 09:08

## 2024-10-18 RX ADMIN — CARBOPLATIN 395 MG: 10 INJECTION, SOLUTION INTRAVENOUS at 14:05

## 2024-10-18 RX ADMIN — DEXAMETHASONE SODIUM PHOSPHATE 10 MG: 10 INJECTION, SOLUTION INTRAMUSCULAR; INTRAVENOUS at 10:00

## 2024-10-23 ENCOUNTER — TELEPHONE (OUTPATIENT)
Dept: FAMILY MEDICINE CLINIC | Age: 84
End: 2024-10-23

## 2024-10-23 NOTE — TELEPHONE ENCOUNTER
Message left for patient that she needs pre op office visit for upcoming surgery on 10/13/24  I did call the surgeon office and patient needs office visit dated within 30 days of surgery and patient's last ov was on 9/12/24   Head injury

## 2024-10-24 ENCOUNTER — TELEPHONE (OUTPATIENT)
Dept: ONCOLOGY | Age: 84
End: 2024-10-24

## 2024-10-24 ENCOUNTER — HOSPITAL ENCOUNTER (EMERGENCY)
Age: 84
Discharge: HOME | End: 2024-10-24
Attending: EMERGENCY MEDICINE
Payer: MEDICARE

## 2024-10-24 ENCOUNTER — APPOINTMENT (OUTPATIENT)
Dept: CT IMAGING | Age: 84
End: 2024-10-24
Payer: MEDICARE

## 2024-10-24 VITALS
HEIGHT: 62 IN | BODY MASS INDEX: 28.16 KG/M2 | SYSTOLIC BLOOD PRESSURE: 167 MMHG | HEART RATE: 86 BPM | OXYGEN SATURATION: 98 % | TEMPERATURE: 98.1 F | RESPIRATION RATE: 18 BRPM | WEIGHT: 153 LBS | DIASTOLIC BLOOD PRESSURE: 86 MMHG

## 2024-10-24 DIAGNOSIS — K59.00 CONSTIPATION, UNSPECIFIED CONSTIPATION TYPE: Primary | ICD-10-CM

## 2024-10-24 DIAGNOSIS — R10.30 LOWER ABDOMINAL PAIN: ICD-10-CM

## 2024-10-24 LAB
ABSOLUTE BANDS: 0.32 K/UL (ref 0–1)
ANION GAP SERPL CALCULATED.3IONS-SCNC: 10 MMOL/L (ref 9–17)
BANDS: 4 % (ref 0–10)
BASOPHILS # BLD: ABNORMAL K/UL (ref 0–0.2)
BASOPHILS NFR BLD: ABNORMAL % (ref 0–2)
BUN SERPL-MCNC: 21 MG/DL (ref 8–23)
BUN/CREAT SERPL: 35 (ref 9–20)
CALCIUM SERPL-MCNC: 9.1 MG/DL (ref 8.6–10.4)
CHLORIDE SERPL-SCNC: 98 MMOL/L (ref 98–107)
CO2 SERPL-SCNC: 26 MMOL/L (ref 20–31)
CREAT SERPL-MCNC: 0.6 MG/DL (ref 0.5–0.9)
EOSINOPHIL # BLD: ABNORMAL K/UL (ref 0–0.4)
EOSINOPHILS RELATIVE PERCENT: ABNORMAL % (ref 0–5)
ERYTHROCYTE [DISTWIDTH] IN BLOOD BY AUTOMATED COUNT: 14.1 % (ref 12.1–15.2)
GFR, ESTIMATED: 88 ML/MIN/1.73M2
GLUCOSE SERPL-MCNC: 128 MG/DL (ref 70–99)
HCT VFR BLD AUTO: 32.6 % (ref 36–46)
HGB BLD-MCNC: 11.4 G/DL (ref 12–16)
IMM GRANULOCYTES # BLD AUTO: ABNORMAL K/UL (ref 0–0.3)
IMM GRANULOCYTES NFR BLD: ABNORMAL %
LYMPHOCYTES NFR BLD: 0.65 K/UL (ref 1–4.8)
LYMPHOCYTES RELATIVE PERCENT: 8 % (ref 15–40)
MCH RBC QN AUTO: 30.2 PG (ref 26–34)
MCHC RBC AUTO-ENTMCNC: 35 G/DL (ref 31–37)
MCV RBC AUTO: 86.2 FL (ref 80–100)
MONOCYTES NFR BLD: 0.41 K/UL (ref 0–1)
MONOCYTES NFR BLD: 5 % (ref 4–8)
MORPHOLOGY: ABNORMAL
NEUTROPHILS NFR BLD: 83 % (ref 47–75)
NEUTS SEG NFR BLD: 6.72 K/UL (ref 2.5–7)
PLATELET # BLD AUTO: 108 K/UL (ref 140–450)
PMV BLD AUTO: 9.7 FL (ref 6–12)
POTASSIUM SERPL-SCNC: 3.7 MMOL/L (ref 3.7–5.3)
RBC # BLD AUTO: 3.78 M/UL (ref 4–5.2)
SODIUM SERPL-SCNC: 134 MMOL/L (ref 135–144)
WBC OTHER # BLD: 8.1 K/UL (ref 3.5–11)

## 2024-10-24 PROCEDURE — 6360000004 HC RX CONTRAST MEDICATION: Performed by: EMERGENCY MEDICINE

## 2024-10-24 PROCEDURE — 85025 COMPLETE CBC W/AUTO DIFF WBC: CPT

## 2024-10-24 PROCEDURE — 80048 BASIC METABOLIC PNL TOTAL CA: CPT

## 2024-10-24 PROCEDURE — 99285 EMERGENCY DEPT VISIT HI MDM: CPT

## 2024-10-24 PROCEDURE — 74177 CT ABD & PELVIS W/CONTRAST: CPT

## 2024-10-24 RX ORDER — IOPAMIDOL 755 MG/ML
75 INJECTION, SOLUTION INTRAVASCULAR
Status: COMPLETED | OUTPATIENT
Start: 2024-10-24 | End: 2024-10-24

## 2024-10-24 RX ADMIN — IOPAMIDOL 75 ML: 755 INJECTION, SOLUTION INTRAVENOUS at 11:05

## 2024-10-24 ASSESSMENT — PAIN DESCRIPTION - FREQUENCY: FREQUENCY: INTERMITTENT

## 2024-10-24 ASSESSMENT — PAIN DESCRIPTION - DESCRIPTORS: DESCRIPTORS: CRAMPING

## 2024-10-24 ASSESSMENT — PAIN SCALES - GENERAL: PAINLEVEL_OUTOF10: 5

## 2024-10-24 ASSESSMENT — PAIN DESCRIPTION - ORIENTATION: ORIENTATION: LOWER;MID;RIGHT;LEFT

## 2024-10-24 ASSESSMENT — PAIN - FUNCTIONAL ASSESSMENT: PAIN_FUNCTIONAL_ASSESSMENT: 0-10

## 2024-10-24 ASSESSMENT — PAIN DESCRIPTION - LOCATION: LOCATION: ABDOMEN

## 2024-10-24 ASSESSMENT — PAIN DESCRIPTION - ONSET: ONSET: ON-GOING

## 2024-10-24 ASSESSMENT — PAIN DESCRIPTION - PAIN TYPE: TYPE: ACUTE PAIN

## 2024-10-24 NOTE — ED NOTES
Enema completed at this time. Patient instructed to hold fluid as long as possible. Patient tolerated without complication. Patient aware to press call light when she is ready for bathroom. Family in room.

## 2024-10-24 NOTE — TELEPHONE ENCOUNTER
[facility] complaining of being constipated for a few days. Has been in a huge amount of pain. Stated she had taken Ex Lax and Milk of Magnesia and it did not work.She had stated that if it did not let up she was going to go to the ER. Reported to her that she may want to head to the ER if she is not feeling any better. Informed to her we had no doctor here today and she understood and agreed she would head out to [facility].

## 2024-10-24 NOTE — ED PROVIDER NOTES
[facility] ED  eMERGENCY dEPARTMENT eNCOUnter      Pt Name: Ruth Hutton  MRN: [id number]  Birthdate 1940  Date of evaluation: 10/24/2024  Provider: Elgin Shea MD    CHIEF COMPLAINT       Chief Complaint   Patient presents with    Constipation     Last BM for four days ago     Patient is an 84-year-old female who presents to the emergency department complaining of lower abdominal pain and constipation.  Patient states that she is currently being treated for ovarian cancer and abdominal carcinomatosis and states that the last few days she has had constipation.  She states she has been unable to get her bowels to move and has done some manual disimpaction but it is not enough and she cannot get it completely cleared.  She states she has some pain in her bilateral lower abdomen left greater than right.  She denies fever or chills.        Nursing Notes were reviewed.    REVIEW OF SYSTEMS    (2-9 systems for level 4, 10 or more for level 5)     Review of Systems    Except as noted above the remainder of the review of systems was reviewed and negative.       PAST MEDICAL HISTORY     Past Medical History:   Diagnosis Date    Urinary incontinence     bladder dropped         SURGICAL HISTORY       Past Surgical History:   Procedure Laterality Date    APPENDECTOMY      BLADDER SUSPENSION  2012    CARPAL TUNNEL RELEASE  01/01/1963    HYSTERECTOMY (CERVIX STATUS UNKNOWN)      IR BIOPSY ABDOMINAL MASS Left 07/24/2024    Dr Tucker/[facility]    IR PORT PLACEMENT EQUAL OR GREATER THAN 5 YEARS  8/28/2024    IR PORT PLACEMENT EQUAL OR GREATER THAN 5 YEARS [facility]    KNEE ARTHROSCOPY      Bilat    US ABDOMINAL MASS BIOPSY PERCUTANEOUS  07/24/2024    US ABDOMINAL MASS BIOPSY PERCUTANEOUS 7/24/2024 [facility] ULTRASOUND         ALLERGIES     Macrobid [nitrofurantoin monohydrate macrocrystals] and Nitrofurantoin    FAMILY HISTORY       Family History   Problem Relation Age of Onset

## 2024-10-29 ENCOUNTER — OFFICE VISIT (OUTPATIENT)
Dept: FAMILY MEDICINE CLINIC | Age: 84
End: 2024-10-29

## 2024-10-29 VITALS
SYSTOLIC BLOOD PRESSURE: 128 MMHG | OXYGEN SATURATION: 97 % | DIASTOLIC BLOOD PRESSURE: 62 MMHG | HEART RATE: 78 BPM | BODY MASS INDEX: 27.44 KG/M2 | WEIGHT: 150 LBS

## 2024-10-29 DIAGNOSIS — C56.9 MALIGNANT NEOPLASM OF OVARY, UNSPECIFIED LATERALITY (HCC): ICD-10-CM

## 2024-10-29 DIAGNOSIS — Z01.818 PRE-OP EXAMINATION: Primary | ICD-10-CM

## 2024-10-29 ASSESSMENT — ENCOUNTER SYMPTOMS
VOMITING: 0
BLOOD IN STOOL: 0
EYE DISCHARGE: 0
DIARRHEA: 0
NAUSEA: 0
CONSTIPATION: 0
ABDOMINAL PAIN: 0
EYE REDNESS: 0
SHORTNESS OF BREATH: 0
COUGH: 0

## 2024-10-29 NOTE — PROGRESS NOTES
HPI Notes    Name: Ruth Hutton  : 1940        Chief Complaint:     Chief Complaint   Patient presents with    Pre-op Exam     Patient here today for pre op clearance. Will be having exploratory laparotomy, cytoreductive surgery(tumor debulking, omentectomy,possible bowel resection, possible splenectomy, possible diaphragm stripping, possible liver resection,possible oophorectomy). Surgery on 24 at [facility] with Dr. Hector Ortega.        History of Present Illness:     Ruth Hutton is a 84 y.o.  female who presents with Pre-op Exam (Patient here today for pre op clearance. Will be having exploratory laparotomy, cytoreductive surgery(tumor debulking, omentectomy,possible bowel resection, possible splenectomy, possible diaphragm stripping, possible liver resection,possible oophorectomy). Surgery on 24 at [facility] with Dr. Hector Ortega. )      HPI  Pre op clearance - pt is here for pre op clearance. Pt is having Laparotomy with cytoreductive surgery (tumor debulking with possible resection bowel, possible spleen, possible liver, possible Oophorectomy and possible diaphragm stripping per Dr Hector Ortega on 24 in [place].    Ovarian cancer - No F/C/N/V. Pt overall has had 4 chemo treatments. Pt had some constipation last week but now moving her bowels with MOM and prune juice (mainly). Overall, pt is doing well.  No chest pain. No SOB. No abdominal pain. No diarrhea. No blood in stool.     Past Medical History:     Past Medical History:   Diagnosis Date    Urinary incontinence     bladder dropped      Reviewed all health maintenance requirements and ordered appropriate tests  Health Maintenance Due   Topic Date Due    DTaP/Tdap/Td vaccine (1 - Tdap) Never done    Shingles vaccine (1 of 2) Never done    Respiratory Syncytial Virus (RSV) Pregnant or age 60 yrs+ (1 - 1-dose 60+ series) Never done    Pneumococcal 65+ years Vaccine (2 of 2 - PPSV23 or PCV20) 2020

## 2024-10-29 NOTE — PATIENT INSTRUCTIONS
SURVEY:    You may be receiving a survey from [facility] regarding your visit today.    Please complete the survey to enable us to provide the highest quality of care to you and your family.    If you cannot score us a very good (5 Stars) on any question, please call the office to discuss how we could have made your experience a very good one.    Thank you.    Clinical Care Team: MD Niko Morrison LPN              Triage: Maru Dunaway CMA              Clerical Team: Maru Schroeder

## 2024-11-08 ENCOUNTER — HOSPITAL ENCOUNTER (OUTPATIENT)
Age: 84
Discharge: HOME | End: 2024-11-08
Payer: MEDICARE

## 2024-11-08 LAB
BASOPHILS # BLD: 0.04 K/UL (ref 0–0.2)
BASOPHILS NFR BLD: 1 % (ref 0–2)
EOSINOPHIL # BLD: <0.03 K/UL (ref 0–0.44)
EOSINOPHILS RELATIVE PERCENT: 0 % (ref 1–4)
ERYTHROCYTE [DISTWIDTH] IN BLOOD BY AUTOMATED COUNT: 15.3 % (ref 11.8–14.4)
HCT VFR BLD AUTO: 35.1 % (ref 36.3–47.1)
HGB BLD-MCNC: 11.5 G/DL (ref 11.9–15.1)
IMM GRANULOCYTES # BLD AUTO: <0.03 K/UL (ref 0–0.3)
IMM GRANULOCYTES NFR BLD: 0 %
LYMPHOCYTES NFR BLD: 0.87 K/UL (ref 1.1–3.7)
LYMPHOCYTES RELATIVE PERCENT: 18 % (ref 24–43)
MCH RBC QN AUTO: 30.4 PG (ref 25.2–33.5)
MCHC RBC AUTO-ENTMCNC: 32.8 G/DL (ref 28.4–34.8)
MCV RBC AUTO: 92.9 FL (ref 82.6–102.9)
MONOCYTES NFR BLD: 0.61 K/UL (ref 0.1–1.2)
MONOCYTES NFR BLD: 12 % (ref 3–12)
NEUTROPHILS NFR BLD: 69 % (ref 36–65)
NEUTS SEG NFR BLD: 3.42 K/UL (ref 1.5–8.1)
NRBC BLD-RTO: 0 PER 100 WBC
PLATELET # BLD AUTO: 219 K/UL (ref 138–453)
PMV BLD AUTO: 8.2 FL (ref 8.1–13.5)
RBC # BLD AUTO: 3.78 M/UL (ref 3.95–5.11)
WBC OTHER # BLD: 5 K/UL (ref 3.5–11.3)

## 2024-11-08 PROCEDURE — 85025 COMPLETE CBC W/AUTO DIFF WBC: CPT

## 2024-11-08 PROCEDURE — 36415 COLL VENOUS BLD VENIPUNCTURE: CPT

## 2024-11-22 ENCOUNTER — HOSPITAL ENCOUNTER (INPATIENT)
Age: 84
LOS: 10 days | Discharge: HOME HEALTH | DRG: 948 | End: 2024-12-02
Attending: INTERNAL MEDICINE | Admitting: INTERNAL MEDICINE
Payer: MEDICARE

## 2024-11-22 DIAGNOSIS — C56.3 MALIGNANT NEOPLASM OF BOTH OVARIES (HCC): ICD-10-CM

## 2024-11-22 DIAGNOSIS — C76.2 ABDOMINAL CARCINOMATOSIS (HCC): Primary | ICD-10-CM

## 2024-11-22 DIAGNOSIS — R53.1 WEAKNESS: ICD-10-CM

## 2024-11-22 PROCEDURE — 94761 N-INVAS EAR/PLS OXIMETRY MLT: CPT

## 2024-11-22 PROCEDURE — 6370000000 HC RX 637 (ALT 250 FOR IP): Performed by: INTERNAL MEDICINE

## 2024-11-22 PROCEDURE — 1200000002 HC SEMI PRIVATE SWING BED

## 2024-11-22 RX ORDER — MAGNESIUM SULFATE IN WATER 40 MG/ML
2000 INJECTION, SOLUTION INTRAVENOUS PRN
Status: DISCONTINUED | OUTPATIENT
Start: 2024-11-22 | End: 2024-12-02 | Stop reason: HOSPADM

## 2024-11-22 RX ORDER — ONDANSETRON 4 MG/1
8 TABLET, ORALLY DISINTEGRATING ORAL EVERY 8 HOURS PRN
Status: DISCONTINUED | OUTPATIENT
Start: 2024-11-22 | End: 2024-12-02 | Stop reason: HOSPADM

## 2024-11-22 RX ORDER — HYDROCHLOROTHIAZIDE 25 MG/1
25 TABLET ORAL DAILY
Status: DISCONTINUED | OUTPATIENT
Start: 2024-11-22 | End: 2024-12-02 | Stop reason: HOSPADM

## 2024-11-22 RX ORDER — ENOXAPARIN SODIUM 100 MG/ML
40 INJECTION SUBCUTANEOUS DAILY
Status: DISCONTINUED | OUTPATIENT
Start: 2024-11-23 | End: 2024-11-22

## 2024-11-22 RX ORDER — POLYETHYLENE GLYCOL 3350 17 G/17G
17 POWDER, FOR SOLUTION ORAL DAILY PRN
Status: DISCONTINUED | OUTPATIENT
Start: 2024-11-22 | End: 2024-12-02 | Stop reason: HOSPADM

## 2024-11-22 RX ORDER — PRAMIPEXOLE DIHYDROCHLORIDE 0.25 MG/1
1.5 TABLET ORAL NIGHTLY
Status: DISCONTINUED | OUTPATIENT
Start: 2024-11-22 | End: 2024-12-02 | Stop reason: HOSPADM

## 2024-11-22 RX ORDER — POTASSIUM CHLORIDE 750 MG/1
40 TABLET, EXTENDED RELEASE ORAL PRN
Status: DISCONTINUED | OUTPATIENT
Start: 2024-11-22 | End: 2024-12-02 | Stop reason: HOSPADM

## 2024-11-22 RX ORDER — ACETAMINOPHEN 325 MG/1
650 TABLET ORAL EVERY 6 HOURS PRN
Status: DISCONTINUED | OUTPATIENT
Start: 2024-11-22 | End: 2024-12-02 | Stop reason: HOSPADM

## 2024-11-22 RX ORDER — PANTOPRAZOLE SODIUM 40 MG/1
40 TABLET, DELAYED RELEASE ORAL DAILY
Status: DISCONTINUED | OUTPATIENT
Start: 2024-11-23 | End: 2024-12-02 | Stop reason: HOSPADM

## 2024-11-22 RX ORDER — OXYCODONE HYDROCHLORIDE 5 MG/1
5 TABLET ORAL EVERY 4 HOURS PRN
Status: ON HOLD | COMMUNITY
Start: 2024-11-21 | End: 2024-12-02

## 2024-11-22 RX ORDER — ONDANSETRON 2 MG/ML
4 INJECTION INTRAMUSCULAR; INTRAVENOUS EVERY 6 HOURS PRN
Status: DISCONTINUED | OUTPATIENT
Start: 2024-11-22 | End: 2024-11-22

## 2024-11-22 RX ORDER — SODIUM CHLORIDE 0.9 % (FLUSH) 0.9 %
5-40 SYRINGE (ML) INJECTION PRN
Status: DISCONTINUED | OUTPATIENT
Start: 2024-11-22 | End: 2024-12-02 | Stop reason: HOSPADM

## 2024-11-22 RX ORDER — OXYCODONE HYDROCHLORIDE 5 MG/1
5 TABLET ORAL EVERY 4 HOURS PRN
Status: DISCONTINUED | OUTPATIENT
Start: 2024-11-22 | End: 2024-12-02 | Stop reason: HOSPADM

## 2024-11-22 RX ORDER — ACETAMINOPHEN 650 MG/1
650 SUPPOSITORY RECTAL EVERY 6 HOURS PRN
Status: DISCONTINUED | OUTPATIENT
Start: 2024-11-22 | End: 2024-12-02 | Stop reason: HOSPADM

## 2024-11-22 RX ORDER — POTASSIUM CHLORIDE 7.45 MG/ML
10 INJECTION INTRAVENOUS PRN
Status: DISCONTINUED | OUTPATIENT
Start: 2024-11-22 | End: 2024-12-02 | Stop reason: HOSPADM

## 2024-11-22 RX ORDER — SODIUM CHLORIDE 0.9 % (FLUSH) 0.9 %
5-40 SYRINGE (ML) INJECTION EVERY 12 HOURS SCHEDULED
Status: DISCONTINUED | OUTPATIENT
Start: 2024-11-22 | End: 2024-11-29

## 2024-11-22 RX ORDER — SODIUM CHLORIDE 9 MG/ML
INJECTION, SOLUTION INTRAVENOUS PRN
Status: DISCONTINUED | OUTPATIENT
Start: 2024-11-22 | End: 2024-12-02 | Stop reason: HOSPADM

## 2024-11-22 RX ORDER — ONDANSETRON 4 MG/1
4 TABLET, ORALLY DISINTEGRATING ORAL EVERY 8 HOURS PRN
Status: DISCONTINUED | OUTPATIENT
Start: 2024-11-22 | End: 2024-11-22

## 2024-11-22 RX ADMIN — PRAMIPEXOLE DIHYDROCHLORIDE 1.5 MG: 0.25 TABLET ORAL at 20:06

## 2024-11-22 RX ADMIN — AMOXICILLIN AND CLAVULANATE POTASSIUM 1 TABLET: 875; 125 TABLET, FILM COATED ORAL at 20:06

## 2024-11-22 RX ADMIN — ACETAMINOPHEN 650 MG: 325 TABLET, FILM COATED ORAL at 20:06

## 2024-11-22 RX ADMIN — APIXABAN 2.5 MG: 5 TABLET, FILM COATED ORAL at 20:06

## 2024-11-22 RX ADMIN — HYDROCHLOROTHIAZIDE 25 MG: 25 TABLET ORAL at 20:06

## 2024-11-22 ASSESSMENT — PAIN DESCRIPTION - ORIENTATION: ORIENTATION: MID

## 2024-11-22 ASSESSMENT — PAIN SCALES - GENERAL
PAINLEVEL_OUTOF10: 1
PAINLEVEL_OUTOF10: 3

## 2024-11-22 ASSESSMENT — PAIN DESCRIPTION - DESCRIPTORS: DESCRIPTORS: CRAMPING

## 2024-11-22 ASSESSMENT — PAIN DESCRIPTION - LOCATION: LOCATION: ABDOMEN

## 2024-11-23 PROCEDURE — 6370000000 HC RX 637 (ALT 250 FOR IP): Performed by: INTERNAL MEDICINE

## 2024-11-23 PROCEDURE — 97161 PT EVAL LOW COMPLEX 20 MIN: CPT

## 2024-11-23 PROCEDURE — 94761 N-INVAS EAR/PLS OXIMETRY MLT: CPT

## 2024-11-23 PROCEDURE — 1200000002 HC SEMI PRIVATE SWING BED

## 2024-11-23 RX ORDER — ROPINIROLE 0.5 MG/1
1 TABLET, FILM COATED ORAL NIGHTLY
Status: DISCONTINUED | OUTPATIENT
Start: 2024-11-23 | End: 2024-12-02 | Stop reason: HOSPADM

## 2024-11-23 RX ORDER — ZOLPIDEM TARTRATE 5 MG/1
5 TABLET ORAL NIGHTLY PRN
Status: DISCONTINUED | OUTPATIENT
Start: 2024-11-23 | End: 2024-12-02 | Stop reason: HOSPADM

## 2024-11-23 RX ADMIN — APIXABAN 2.5 MG: 5 TABLET, FILM COATED ORAL at 19:53

## 2024-11-23 RX ADMIN — HYDROCHLOROTHIAZIDE 25 MG: 25 TABLET ORAL at 08:08

## 2024-11-23 RX ADMIN — APIXABAN 2.5 MG: 5 TABLET, FILM COATED ORAL at 08:09

## 2024-11-23 RX ADMIN — AMOXICILLIN AND CLAVULANATE POTASSIUM 1 TABLET: 875; 125 TABLET, FILM COATED ORAL at 08:08

## 2024-11-23 RX ADMIN — PANTOPRAZOLE SODIUM 40 MG: 40 TABLET, DELAYED RELEASE ORAL at 08:09

## 2024-11-23 RX ADMIN — AMOXICILLIN AND CLAVULANATE POTASSIUM 1 TABLET: 875; 125 TABLET, FILM COATED ORAL at 19:53

## 2024-11-23 RX ADMIN — ACETAMINOPHEN 650 MG: 325 TABLET, FILM COATED ORAL at 12:40

## 2024-11-23 RX ADMIN — ZOLPIDEM TARTRATE 5 MG: 5 TABLET, COATED ORAL at 21:13

## 2024-11-23 RX ADMIN — ROPINIROLE HYDROCHLORIDE 1 MG: 0.5 TABLET, FILM COATED ORAL at 21:13

## 2024-11-23 RX ADMIN — ACETAMINOPHEN 650 MG: 325 TABLET, FILM COATED ORAL at 19:53

## 2024-11-23 ASSESSMENT — PAIN DESCRIPTION - ORIENTATION: ORIENTATION: MID

## 2024-11-23 ASSESSMENT — PAIN - FUNCTIONAL ASSESSMENT: PAIN_FUNCTIONAL_ASSESSMENT: ACTIVITIES ARE NOT PREVENTED

## 2024-11-23 ASSESSMENT — PAIN DESCRIPTION - LOCATION
LOCATION: BACK
LOCATION: ABDOMEN

## 2024-11-23 ASSESSMENT — PAIN SCALES - GENERAL
PAINLEVEL_OUTOF10: 4
PAINLEVEL_OUTOF10: 3
PAINLEVEL_OUTOF10: 3
PAINLEVEL_OUTOF10: 0

## 2024-11-23 ASSESSMENT — PAIN DESCRIPTION - DESCRIPTORS: DESCRIPTORS: ACHING;DISCOMFORT

## 2024-11-24 PROCEDURE — 1200000002 HC SEMI PRIVATE SWING BED

## 2024-11-24 PROCEDURE — 6370000000 HC RX 637 (ALT 250 FOR IP): Performed by: INTERNAL MEDICINE

## 2024-11-24 PROCEDURE — 94761 N-INVAS EAR/PLS OXIMETRY MLT: CPT

## 2024-11-24 PROCEDURE — 97116 GAIT TRAINING THERAPY: CPT

## 2024-11-24 PROCEDURE — 97110 THERAPEUTIC EXERCISES: CPT

## 2024-11-24 RX ADMIN — AMOXICILLIN AND CLAVULANATE POTASSIUM 1 TABLET: 875; 125 TABLET, FILM COATED ORAL at 20:48

## 2024-11-24 RX ADMIN — ACETAMINOPHEN 650 MG: 325 TABLET, FILM COATED ORAL at 15:15

## 2024-11-24 RX ADMIN — PANTOPRAZOLE SODIUM 40 MG: 40 TABLET, DELAYED RELEASE ORAL at 08:47

## 2024-11-24 RX ADMIN — APIXABAN 2.5 MG: 5 TABLET, FILM COATED ORAL at 08:47

## 2024-11-24 RX ADMIN — APIXABAN 2.5 MG: 5 TABLET, FILM COATED ORAL at 20:48

## 2024-11-24 RX ADMIN — AMOXICILLIN AND CLAVULANATE POTASSIUM 1 TABLET: 875; 125 TABLET, FILM COATED ORAL at 08:47

## 2024-11-24 RX ADMIN — ROPINIROLE HYDROCHLORIDE 1 MG: 0.5 TABLET, FILM COATED ORAL at 20:48

## 2024-11-24 RX ADMIN — ACETAMINOPHEN 650 MG: 325 TABLET, FILM COATED ORAL at 22:39

## 2024-11-24 RX ADMIN — HYDROCHLOROTHIAZIDE 25 MG: 25 TABLET ORAL at 08:47

## 2024-11-24 RX ADMIN — ZOLPIDEM TARTRATE 5 MG: 5 TABLET, COATED ORAL at 20:48

## 2024-11-24 ASSESSMENT — PAIN - FUNCTIONAL ASSESSMENT
PAIN_FUNCTIONAL_ASSESSMENT: ACTIVITIES ARE NOT PREVENTED
PAIN_FUNCTIONAL_ASSESSMENT: PREVENTS OR INTERFERES SOME ACTIVE ACTIVITIES AND ADLS

## 2024-11-24 ASSESSMENT — PAIN DESCRIPTION - ORIENTATION
ORIENTATION: RIGHT;LOWER
ORIENTATION: LOWER

## 2024-11-24 ASSESSMENT — PAIN SCALES - GENERAL
PAINLEVEL_OUTOF10: 0
PAINLEVEL_OUTOF10: 4
PAINLEVEL_OUTOF10: 0
PAINLEVEL_OUTOF10: 3

## 2024-11-24 ASSESSMENT — PAIN DESCRIPTION - LOCATION
LOCATION: ABDOMEN
LOCATION: BACK;NECK

## 2024-11-24 ASSESSMENT — PAIN DESCRIPTION - DESCRIPTORS
DESCRIPTORS: ACHING
DESCRIPTORS: ACHING

## 2024-11-25 PROCEDURE — 94761 N-INVAS EAR/PLS OXIMETRY MLT: CPT

## 2024-11-25 PROCEDURE — 6370000000 HC RX 637 (ALT 250 FOR IP): Performed by: INTERNAL MEDICINE

## 2024-11-25 PROCEDURE — 1200000002 HC SEMI PRIVATE SWING BED

## 2024-11-25 PROCEDURE — 97166 OT EVAL MOD COMPLEX 45 MIN: CPT

## 2024-11-25 PROCEDURE — 97116 GAIT TRAINING THERAPY: CPT

## 2024-11-25 PROCEDURE — 97110 THERAPEUTIC EXERCISES: CPT

## 2024-11-25 RX ORDER — TRAZODONE HYDROCHLORIDE 50 MG/1
25 TABLET, FILM COATED ORAL NIGHTLY
Status: DISCONTINUED | OUTPATIENT
Start: 2024-11-25 | End: 2024-12-02 | Stop reason: HOSPADM

## 2024-11-25 RX ORDER — SULFAMETHOXAZOLE AND TRIMETHOPRIM 800; 160 MG/1; MG/1
1 TABLET ORAL EVERY 12 HOURS SCHEDULED
Status: COMPLETED | OUTPATIENT
Start: 2024-11-25 | End: 2024-11-27

## 2024-11-25 RX ADMIN — SULFAMETHOXAZOLE AND TRIMETHOPRIM 1 TABLET: 800; 160 TABLET ORAL at 20:09

## 2024-11-25 RX ADMIN — PANTOPRAZOLE SODIUM 40 MG: 40 TABLET, DELAYED RELEASE ORAL at 09:03

## 2024-11-25 RX ADMIN — OXYCODONE HYDROCHLORIDE 5 MG: 5 TABLET ORAL at 16:37

## 2024-11-25 RX ADMIN — APIXABAN 2.5 MG: 5 TABLET, FILM COATED ORAL at 20:10

## 2024-11-25 RX ADMIN — ZOLPIDEM TARTRATE 5 MG: 5 TABLET, COATED ORAL at 20:10

## 2024-11-25 RX ADMIN — AMOXICILLIN AND CLAVULANATE POTASSIUM 1 TABLET: 875; 125 TABLET, FILM COATED ORAL at 20:10

## 2024-11-25 RX ADMIN — ROPINIROLE HYDROCHLORIDE 1 MG: 0.5 TABLET, FILM COATED ORAL at 20:10

## 2024-11-25 RX ADMIN — AMOXICILLIN AND CLAVULANATE POTASSIUM 1 TABLET: 875; 125 TABLET, FILM COATED ORAL at 09:03

## 2024-11-25 RX ADMIN — TRAZODONE HYDROCHLORIDE 25 MG: 50 TABLET ORAL at 20:10

## 2024-11-25 RX ADMIN — APIXABAN 2.5 MG: 5 TABLET, FILM COATED ORAL at 09:03

## 2024-11-25 RX ADMIN — PRAMIPEXOLE DIHYDROCHLORIDE 1.5 MG: 0.25 TABLET ORAL at 20:09

## 2024-11-25 RX ADMIN — ACETAMINOPHEN 650 MG: 325 TABLET, FILM COATED ORAL at 11:38

## 2024-11-25 RX ADMIN — OXYCODONE HYDROCHLORIDE 5 MG: 5 TABLET ORAL at 20:37

## 2024-11-25 RX ADMIN — ACETAMINOPHEN 650 MG: 325 TABLET, FILM COATED ORAL at 04:33

## 2024-11-25 RX ADMIN — HYDROCHLOROTHIAZIDE 25 MG: 25 TABLET ORAL at 09:03

## 2024-11-25 ASSESSMENT — PAIN DESCRIPTION - ORIENTATION
ORIENTATION: RIGHT;LEFT
ORIENTATION: RIGHT

## 2024-11-25 ASSESSMENT — PAIN - FUNCTIONAL ASSESSMENT
PAIN_FUNCTIONAL_ASSESSMENT: PREVENTS OR INTERFERES SOME ACTIVE ACTIVITIES AND ADLS
PAIN_FUNCTIONAL_ASSESSMENT: ACTIVITIES ARE NOT PREVENTED
PAIN_FUNCTIONAL_ASSESSMENT: PREVENTS OR INTERFERES SOME ACTIVE ACTIVITIES AND ADLS

## 2024-11-25 ASSESSMENT — PAIN SCALES - GENERAL
PAINLEVEL_OUTOF10: 2
PAINLEVEL_OUTOF10: 0
PAINLEVEL_OUTOF10: 0
PAINLEVEL_OUTOF10: 3
PAINLEVEL_OUTOF10: 4
PAINLEVEL_OUTOF10: 0
PAINLEVEL_OUTOF10: 0
PAINLEVEL_OUTOF10: 5
PAINLEVEL_OUTOF10: 1
PAINLEVEL_OUTOF10: 0
PAINLEVEL_OUTOF10: 5

## 2024-11-25 ASSESSMENT — PAIN DESCRIPTION - DESCRIPTORS
DESCRIPTORS: ACHING;DISCOMFORT;THROBBING;TENDER
DESCRIPTORS: ACHING
DESCRIPTORS: ACHING
DESCRIPTORS: ACHING;DISCOMFORT

## 2024-11-25 ASSESSMENT — PAIN DESCRIPTION - LOCATION
LOCATION: BACK
LOCATION: ABDOMEN
LOCATION: BACK
LOCATION: BACK

## 2024-11-25 NOTE — DISCHARGE INSTR - COC
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    Weakness R53.1       Isolation/Infection:   Isolation            No Isolation          Patient Infection Status       None to display            Nurse Assessment:  Last Vital Signs: BP (!) 145/94   Pulse 84   Temp 98.2 °F (36.8 °C) (Oral)   Resp 16   Ht 1.575 m (5' 2\")   Wt 66.5 kg (146 lb 9.7 oz)   SpO2 95%   BMI 26.81 kg/m²     Last documented pain score (0-10 scale): Pain Level: 3  Last Weight:   Wt Readings from Last 1 Encounters:   11/23/24 66.5 kg (146 lb 9.7 oz)     Mental Status:  oriented and alert    IV Access:  Implanted port right chest-not accessed    Nursing Mobility/ADLs:  Walking   Assisted  Transfer  Assisted  Bathing  Assisted  Dressing  Assisted  Toileting  Assisted  Feeding  Independent  Med Admin  Independent  Med Delivery   whole    Wound Care Documentation and Therapy:  Wound 11/22/24 Abdomen Anterior (Active)   Wound Etiology Surgical 11/25/24 0741   Dressing Status Clean;Dry;Intact 11/25/24 0741   Wound Cleansed Not Cleansed 11/25/24 0741   Dressing/Treatment Open to air 11/25/24 0741   Wound Assessment Dry 11/25/24 0741   Drainage Amount None (dry) 11/25/24 0741   Odor None 11/25/24 0741   Ewa-wound Assessment Warm;Blanchable erythema 11/25/24 0741   Number of days: 2        Elimination:  Continence:   Bowel: Colostomy  Bladder: Yes  Urinary Catheter: None   Colostomy/Ileostomy/Ileal Conduit: Yes  Colostomy LLQ-Stoma  Assessment: Moist, Pink  Colostomy LLQ-Peristomal Assessment: Clean, dry & intact  Colostomy LLQ-Stool Appearance: Loose, Mucous  Colostomy LLQ-Stool Color: Brown  Colostomy LLQ-Stool

## 2024-11-25 NOTE — ACP (ADVANCE CARE PLANNING)
Advance Care Planning     Advance Care Planning Activator (Inpatient)  Conversation Note      Date of ACP Conversation: 11/25/2024     Conversation Conducted with: Patient with Decision Making Capacity    ACP Activator: Tracey Feldman, MSW, LSW        Health Care Decision Maker:     Current Designated Health Care Decision Maker:     Primary Decision Maker: Kehinde Hutton - Spouse - [phone]    Secondary Decision Maker: Aminata Benton - Child - [phone]  Click here to complete Healthcare Decision Makers including section of the Healthcare Decision Maker Relationship (ie \"Primary\")  Today we documented Decision Maker(s) consistent with Legal Next of Kin hierarchy.    Care Preferences    Ventilation:  \"If you were in your present state of health and suddenly became very ill and were unable to breathe on your own, what would your preference be about the use of a ventilator (breathing machine) if it were available to you?\"      Would the patient desire the use of ventilator (breathing machine)?: yes    \"If your health worsens and it becomes clear that your chance of recovery is unlikely, what would your preference be about the use of a ventilator (breathing machine) if it were available to you?\"     Would the patient desire the use of ventilator (breathing machine)?: No      Resuscitation  \"CPR works best to restart the heart when there is a sudden event, like a heart attack, in someone who is otherwise healthy. Unfortunately, CPR does not typically restart the heart for people who have serious health conditions or who are very sick.\"    \"In the event your heart stopped as a result of an underlying serious health condition, would you want attempts to be made to restart your heart (answer \"yes\" for attempt to resuscitate) or would you prefer a natural death (answer \"no\" for do not attempt to resuscitate)?\" yes       [] Yes   [x] No   Educated Patient / Decision Maker regarding differences between Advance

## 2024-11-25 NOTE — CARE COORDINATION
Case Management Assessment  Initial Evaluation    Date/Time of Evaluation: 11/25/2024 12:21 PM  Assessment Completed by: Adalberto Lafleur RN    If patient is discharged prior to next notation, then this note serves as note for discharge by case management.    Patient Name: Ruth Hutton                   YOB: 1940  Diagnosis: Weakness [R53.1]                   Date / Time: 11/22/2024  5:35 PM    Patient Admission Status: SKILLED IP   Readmission Risk (Low < 19, Mod (19-27), High > 27): Readmission Risk Score: 11.1    Current PCP: Lary Liriano MD  PCP verified by CM? (P) Yes (Dr Liriano)    Chart Reviewed: Yes      History Provided by: (P) Patient  Patient Orientation: (P) Alert and Oriented, Person, Place, Situation, Self    Patient Cognition: (P) Alert    Hospitalization in the last 30 days (Readmission):  No    If yes, Readmission Assessment in  Navigator will be completed.    Advance Directives:      Code Status: Full Code   Patient's Primary Decision Maker is: (P) Legal Next of Kin    Primary Decision Maker: Kehinde Hutton - Spouse - [phone]    Secondary Decision Maker: Aminata Benton - Child - [phone]    Discharge Planning:    Patient lives with: (P) Spouse/Significant Other, Children Type of Home: (P) House  Primary Care Giver: (P) Self  Patient Support Systems include: (P) Spouse/Significant Other, Children, Family Members   Current Financial resources: (P) Medicare  Current community resources: (P) None  Current services prior to admission: (P) None            Current DME:              Type of Home Care services:  (P) Nursing Services    ADLS  Prior functional level: (P) Independent in ADLs/IADLs  Current functional level: (P) Assistance with the following:, Mobility, Shopping, Housework, Cooking    PT AM-PAC:   /24  OT AM-PAC:   /24    Family can provide assistance at DC: (P) Yes  Would you like Case Management to discuss the discharge plan with any other family 
Home

## 2024-11-25 NOTE — THERAPY EVALUATION
[facility]  Physical Therapy  Evaluation  Date: 2024  Patient Name: Ruth Hutton        MRN: [id number]    : 1940  (84 y.o.)  Gender: female      Diagnosis: Weakness  Additional Pertinent Hx: Ovarian cancer, bilateral oophorectomy with resection of bladder peritoneum and rectosigmoid 24.  HTN, hydronephrosis, incontinence.  Colostomy bag   Past Medical History:   Diagnosis Date    Urinary incontinence     bladder dropped     Past Surgical History:   Procedure Laterality Date    APPENDECTOMY      BLADDER SUSPENSION      CARPAL TUNNEL RELEASE  1963    HYSTERECTOMY (CERVIX STATUS UNKNOWN)      IR BIOPSY ABDOMINAL MASS Left 2024    Dr Tucker/[facility]    IR PORT PLACEMENT EQUAL OR GREATER THAN 5 YEARS  2024    IR PORT PLACEMENT EQUAL OR GREATER THAN 5 YEARS 2024 [facility] CARDIAC CATH/IR LAB    KNEE ARTHROSCOPY      Bilat    US ABDOMINAL MASS BIOPSY PERCUTANEOUS  2024    US ABDOMINAL MASS BIOPSY PERCUTANEOUS 2024 [facility] ULTRASOUND       Restrictions  Restrictions/Precautions: Other (comment) (10 lb lifting restriction)      Subjective      Subjective: Patient reports only minor abdominal pain.  She has been walking with PT at [facility] reports doing well.  Pain Level: 0    Orientation       Home Living / Prior Level of Function  Social/Functional History  Lives With: Spouse, Son  Type of Home: House  Home Layout: One level  Home Access: Stairs to enter with rails  Entrance Stairs - Number of Steps: 2  Entrance Stairs - Rails: Right  Bathroom Shower/Tub: Walk-in shower  Bathroom Toilet: Handicap height  Bathroom Equipment: Grab bars in shower, Shower chair, Hand-held shower, Toilet raiser, Grab bars around toilet  Bathroom Accessibility: Accessible  Home Equipment: Walker - Rolling  Has the patient had two or more falls in the past year or any fall with injury in the past year?: No  ADL Assistance: Independent  Homemaking Assistance: 
functional transfers, patient will engage in 10 minutes of BUE ther ex without the need for a rest break.  Short Term Goal 3: To increase independence and safety with I/ADLs, patient will tolerate static/dynamic standing x5 without LOB.    Long term goals:  Long Term Goals  Time Frame for Long Term Goals : STG=LTG    Plan:     Times Per Week: 2-3x.  Discharge recommendation: Home with home health services          Time In: 1303  Time Out: 1323  Timed Coded Minutes: 0  Total Treatment Time: 20    FAISAL Nguyen/MARCO      Date: 11/25/2024

## 2024-11-26 ENCOUNTER — TELEPHONE (OUTPATIENT)
Dept: FAMILY MEDICINE CLINIC | Age: 84
End: 2024-11-26

## 2024-11-26 PROCEDURE — 6370000000 HC RX 637 (ALT 250 FOR IP): Performed by: INTERNAL MEDICINE

## 2024-11-26 PROCEDURE — 94761 N-INVAS EAR/PLS OXIMETRY MLT: CPT

## 2024-11-26 PROCEDURE — 97535 SELF CARE MNGMENT TRAINING: CPT

## 2024-11-26 PROCEDURE — 97116 GAIT TRAINING THERAPY: CPT

## 2024-11-26 PROCEDURE — 97110 THERAPEUTIC EXERCISES: CPT

## 2024-11-26 PROCEDURE — 1200000002 HC SEMI PRIVATE SWING BED

## 2024-11-26 RX ADMIN — POLYETHYLENE GLYCOL 3350 17 G: 17 POWDER, FOR SOLUTION ORAL at 15:25

## 2024-11-26 RX ADMIN — PANTOPRAZOLE SODIUM 40 MG: 40 TABLET, DELAYED RELEASE ORAL at 08:56

## 2024-11-26 RX ADMIN — AMOXICILLIN AND CLAVULANATE POTASSIUM 1 TABLET: 875; 125 TABLET, FILM COATED ORAL at 21:29

## 2024-11-26 RX ADMIN — OXYCODONE HYDROCHLORIDE 5 MG: 5 TABLET ORAL at 21:29

## 2024-11-26 RX ADMIN — ROPINIROLE HYDROCHLORIDE 1 MG: 0.5 TABLET, FILM COATED ORAL at 21:30

## 2024-11-26 RX ADMIN — SULFAMETHOXAZOLE AND TRIMETHOPRIM 1 TABLET: 800; 160 TABLET ORAL at 08:57

## 2024-11-26 RX ADMIN — APIXABAN 2.5 MG: 5 TABLET, FILM COATED ORAL at 08:56

## 2024-11-26 RX ADMIN — HYDROCHLOROTHIAZIDE 25 MG: 25 TABLET ORAL at 08:56

## 2024-11-26 RX ADMIN — SULFAMETHOXAZOLE AND TRIMETHOPRIM 1 TABLET: 800; 160 TABLET ORAL at 21:29

## 2024-11-26 RX ADMIN — ZOLPIDEM TARTRATE 5 MG: 5 TABLET, COATED ORAL at 21:31

## 2024-11-26 RX ADMIN — AMOXICILLIN AND CLAVULANATE POTASSIUM 1 TABLET: 875; 125 TABLET, FILM COATED ORAL at 08:56

## 2024-11-26 RX ADMIN — PRAMIPEXOLE DIHYDROCHLORIDE 1.5 MG: 0.25 TABLET ORAL at 21:30

## 2024-11-26 RX ADMIN — TRAZODONE HYDROCHLORIDE 25 MG: 50 TABLET ORAL at 21:30

## 2024-11-26 RX ADMIN — APIXABAN 2.5 MG: 5 TABLET, FILM COATED ORAL at 21:31

## 2024-11-26 ASSESSMENT — PAIN DESCRIPTION - PAIN TYPE
TYPE: ACUTE PAIN
TYPE: ACUTE PAIN

## 2024-11-26 ASSESSMENT — PAIN DESCRIPTION - DESCRIPTORS
DESCRIPTORS: ACHING
DESCRIPTORS: ACHING

## 2024-11-26 ASSESSMENT — PAIN SCALES - GENERAL
PAINLEVEL_OUTOF10: 3
PAINLEVEL_OUTOF10: 3
PAINLEVEL_OUTOF10: 5
PAINLEVEL_OUTOF10: 0
PAINLEVEL_OUTOF10: 4

## 2024-11-26 ASSESSMENT — PAIN DESCRIPTION - LOCATION
LOCATION: ABDOMEN
LOCATION: BACK

## 2024-11-26 ASSESSMENT — PAIN DESCRIPTION - ONSET
ONSET: GRADUAL
ONSET: GRADUAL

## 2024-11-26 ASSESSMENT — PAIN DESCRIPTION - FREQUENCY
FREQUENCY: INTERMITTENT
FREQUENCY: INTERMITTENT

## 2024-11-26 ASSESSMENT — PAIN - FUNCTIONAL ASSESSMENT: PAIN_FUNCTIONAL_ASSESSMENT: ACTIVITIES ARE NOT PREVENTED

## 2024-11-26 ASSESSMENT — PAIN DESCRIPTION - ORIENTATION
ORIENTATION: RIGHT;LOWER
ORIENTATION: LOWER

## 2024-11-27 PROCEDURE — 94761 N-INVAS EAR/PLS OXIMETRY MLT: CPT

## 2024-11-27 PROCEDURE — 97535 SELF CARE MNGMENT TRAINING: CPT

## 2024-11-27 PROCEDURE — 97110 THERAPEUTIC EXERCISES: CPT

## 2024-11-27 PROCEDURE — 97116 GAIT TRAINING THERAPY: CPT

## 2024-11-27 PROCEDURE — 6370000000 HC RX 637 (ALT 250 FOR IP): Performed by: INTERNAL MEDICINE

## 2024-11-27 PROCEDURE — 1200000002 HC SEMI PRIVATE SWING BED

## 2024-11-27 RX ADMIN — ZOLPIDEM TARTRATE 5 MG: 5 TABLET, COATED ORAL at 21:12

## 2024-11-27 RX ADMIN — AMOXICILLIN AND CLAVULANATE POTASSIUM 1 TABLET: 875; 125 TABLET, FILM COATED ORAL at 08:52

## 2024-11-27 RX ADMIN — HYDROCHLOROTHIAZIDE 25 MG: 25 TABLET ORAL at 08:51

## 2024-11-27 RX ADMIN — APIXABAN 2.5 MG: 5 TABLET, FILM COATED ORAL at 08:51

## 2024-11-27 RX ADMIN — SULFAMETHOXAZOLE AND TRIMETHOPRIM 1 TABLET: 800; 160 TABLET ORAL at 21:12

## 2024-11-27 RX ADMIN — TRAZODONE HYDROCHLORIDE 25 MG: 50 TABLET ORAL at 21:12

## 2024-11-27 RX ADMIN — ROPINIROLE HYDROCHLORIDE 1 MG: 0.5 TABLET, FILM COATED ORAL at 21:12

## 2024-11-27 RX ADMIN — ONDANSETRON 8 MG: 4 TABLET, ORALLY DISINTEGRATING ORAL at 04:16

## 2024-11-27 RX ADMIN — APIXABAN 2.5 MG: 5 TABLET, FILM COATED ORAL at 21:12

## 2024-11-27 RX ADMIN — SULFAMETHOXAZOLE AND TRIMETHOPRIM 1 TABLET: 800; 160 TABLET ORAL at 08:53

## 2024-11-27 RX ADMIN — PRAMIPEXOLE DIHYDROCHLORIDE 1.5 MG: 0.25 TABLET ORAL at 21:12

## 2024-11-27 RX ADMIN — OXYCODONE HYDROCHLORIDE 5 MG: 5 TABLET ORAL at 21:12

## 2024-11-27 RX ADMIN — PANTOPRAZOLE SODIUM 40 MG: 40 TABLET, DELAYED RELEASE ORAL at 08:51

## 2024-11-27 ASSESSMENT — PAIN DESCRIPTION - LOCATION: LOCATION: BACK

## 2024-11-27 ASSESSMENT — PAIN DESCRIPTION - ORIENTATION: ORIENTATION: RIGHT;LOWER

## 2024-11-27 ASSESSMENT — PAIN - FUNCTIONAL ASSESSMENT
PAIN_FUNCTIONAL_ASSESSMENT: ACTIVITIES ARE NOT PREVENTED
PAIN_FUNCTIONAL_ASSESSMENT: 0-10
PAIN_FUNCTIONAL_ASSESSMENT: NONE - DENIES PAIN

## 2024-11-27 ASSESSMENT — PAIN SCALES - GENERAL
PAINLEVEL_OUTOF10: 5
PAINLEVEL_OUTOF10: 0

## 2024-11-27 ASSESSMENT — PAIN DESCRIPTION - DESCRIPTORS: DESCRIPTORS: ACHING;SORE;THROBBING

## 2024-11-28 PROCEDURE — 6370000000 HC RX 637 (ALT 250 FOR IP): Performed by: INTERNAL MEDICINE

## 2024-11-28 PROCEDURE — 97116 GAIT TRAINING THERAPY: CPT

## 2024-11-28 PROCEDURE — 94761 N-INVAS EAR/PLS OXIMETRY MLT: CPT

## 2024-11-28 PROCEDURE — 97110 THERAPEUTIC EXERCISES: CPT

## 2024-11-28 PROCEDURE — 1200000002 HC SEMI PRIVATE SWING BED

## 2024-11-28 RX ORDER — AMOXICILLIN AND CLAVULANATE POTASSIUM 600; 42.9 MG/5ML; MG/5ML
875 POWDER, FOR SUSPENSION ORAL EVERY 12 HOURS SCHEDULED
Status: COMPLETED | OUTPATIENT
Start: 2024-11-28 | End: 2024-11-29

## 2024-11-28 RX ADMIN — HYDROCHLOROTHIAZIDE 25 MG: 25 TABLET ORAL at 10:34

## 2024-11-28 RX ADMIN — TRAZODONE HYDROCHLORIDE 25 MG: 50 TABLET ORAL at 20:28

## 2024-11-28 RX ADMIN — OXYCODONE HYDROCHLORIDE 5 MG: 5 TABLET ORAL at 03:49

## 2024-11-28 RX ADMIN — OXYCODONE HYDROCHLORIDE 5 MG: 5 TABLET ORAL at 20:00

## 2024-11-28 RX ADMIN — APIXABAN 2.5 MG: 5 TABLET, FILM COATED ORAL at 20:26

## 2024-11-28 RX ADMIN — PRAMIPEXOLE DIHYDROCHLORIDE 1.5 MG: 0.25 TABLET ORAL at 20:32

## 2024-11-28 RX ADMIN — APIXABAN 2.5 MG: 5 TABLET, FILM COATED ORAL at 10:33

## 2024-11-28 RX ADMIN — ROPINIROLE HYDROCHLORIDE 1 MG: 0.5 TABLET, FILM COATED ORAL at 20:29

## 2024-11-28 RX ADMIN — PANTOPRAZOLE SODIUM 40 MG: 40 TABLET, DELAYED RELEASE ORAL at 10:34

## 2024-11-28 RX ADMIN — AMOXICILLIN AND CLAVULANATE POTASSIUM 875 MG: 600; 42.9 POWDER, FOR SUSPENSION ORAL at 20:34

## 2024-11-28 ASSESSMENT — PAIN SCALES - GENERAL
PAINLEVEL_OUTOF10: 0
PAINLEVEL_OUTOF10: 5
PAINLEVEL_OUTOF10: 4
PAINLEVEL_OUTOF10: 2

## 2024-11-28 ASSESSMENT — PAIN DESCRIPTION - LOCATION
LOCATION: BACK
LOCATION: BACK

## 2024-11-28 ASSESSMENT — PAIN DESCRIPTION - ORIENTATION
ORIENTATION: LOWER
ORIENTATION: RIGHT;LOWER

## 2024-11-28 ASSESSMENT — PAIN - FUNCTIONAL ASSESSMENT
PAIN_FUNCTIONAL_ASSESSMENT: PREVENTS OR INTERFERES SOME ACTIVE ACTIVITIES AND ADLS
PAIN_FUNCTIONAL_ASSESSMENT: ACTIVITIES ARE NOT PREVENTED

## 2024-11-28 ASSESSMENT — PAIN DESCRIPTION - FREQUENCY: FREQUENCY: INTERMITTENT

## 2024-11-28 ASSESSMENT — PAIN DESCRIPTION - DESCRIPTORS
DESCRIPTORS: ACHING;SORE
DESCRIPTORS: ACHING

## 2024-11-28 ASSESSMENT — PAIN DESCRIPTION - ONSET: ONSET: GRADUAL

## 2024-11-28 ASSESSMENT — PAIN DESCRIPTION - PAIN TYPE
TYPE: ACUTE PAIN;SURGICAL PAIN
TYPE: ACUTE PAIN

## 2024-11-29 PROCEDURE — 94761 N-INVAS EAR/PLS OXIMETRY MLT: CPT

## 2024-11-29 PROCEDURE — 6370000000 HC RX 637 (ALT 250 FOR IP): Performed by: INTERNAL MEDICINE

## 2024-11-29 PROCEDURE — 1200000002 HC SEMI PRIVATE SWING BED

## 2024-11-29 PROCEDURE — 97110 THERAPEUTIC EXERCISES: CPT

## 2024-11-29 PROCEDURE — 97116 GAIT TRAINING THERAPY: CPT

## 2024-11-29 RX ADMIN — TRAZODONE HYDROCHLORIDE 25 MG: 50 TABLET ORAL at 21:16

## 2024-11-29 RX ADMIN — PANTOPRAZOLE SODIUM 40 MG: 40 TABLET, DELAYED RELEASE ORAL at 08:51

## 2024-11-29 RX ADMIN — ACETAMINOPHEN 650 MG: 325 TABLET, FILM COATED ORAL at 01:44

## 2024-11-29 RX ADMIN — AMOXICILLIN AND CLAVULANATE POTASSIUM 875 MG: 600; 42.9 POWDER, FOR SUSPENSION ORAL at 08:57

## 2024-11-29 RX ADMIN — HYDROCHLOROTHIAZIDE 25 MG: 25 TABLET ORAL at 08:51

## 2024-11-29 RX ADMIN — ACETAMINOPHEN 650 MG: 325 TABLET, FILM COATED ORAL at 16:23

## 2024-11-29 RX ADMIN — ZOLPIDEM TARTRATE 5 MG: 5 TABLET, COATED ORAL at 21:16

## 2024-11-29 RX ADMIN — ROPINIROLE HYDROCHLORIDE 1 MG: 0.5 TABLET, FILM COATED ORAL at 21:16

## 2024-11-29 RX ADMIN — APIXABAN 2.5 MG: 5 TABLET, FILM COATED ORAL at 21:16

## 2024-11-29 RX ADMIN — OXYCODONE HYDROCHLORIDE 5 MG: 5 TABLET ORAL at 21:16

## 2024-11-29 RX ADMIN — APIXABAN 2.5 MG: 5 TABLET, FILM COATED ORAL at 08:51

## 2024-11-29 RX ADMIN — PRAMIPEXOLE DIHYDROCHLORIDE 1.5 MG: 0.25 TABLET ORAL at 21:16

## 2024-11-29 ASSESSMENT — PAIN SCALES - GENERAL
PAINLEVEL_OUTOF10: 2
PAINLEVEL_OUTOF10: 5
PAINLEVEL_OUTOF10: 5
PAINLEVEL_OUTOF10: 0
PAINLEVEL_OUTOF10: 2
PAINLEVEL_OUTOF10: 0
PAINLEVEL_OUTOF10: 5
PAINLEVEL_OUTOF10: 5
PAINLEVEL_OUTOF10: 0

## 2024-11-29 ASSESSMENT — PAIN DESCRIPTION - DESCRIPTORS
DESCRIPTORS: ACHING

## 2024-11-29 ASSESSMENT — PAIN DESCRIPTION - LOCATION
LOCATION: HEAD
LOCATION: BACK
LOCATION: BACK

## 2024-11-29 ASSESSMENT — PAIN DESCRIPTION - ORIENTATION
ORIENTATION: MID;LOWER
ORIENTATION: LOWER;MID

## 2024-11-30 PROCEDURE — 1200000002 HC SEMI PRIVATE SWING BED

## 2024-11-30 PROCEDURE — 97116 GAIT TRAINING THERAPY: CPT

## 2024-11-30 PROCEDURE — 94761 N-INVAS EAR/PLS OXIMETRY MLT: CPT

## 2024-11-30 PROCEDURE — 6370000000 HC RX 637 (ALT 250 FOR IP): Performed by: INTERNAL MEDICINE

## 2024-11-30 PROCEDURE — 97110 THERAPEUTIC EXERCISES: CPT

## 2024-11-30 RX ORDER — DOCUSATE SODIUM 100 MG/1
100 CAPSULE, LIQUID FILLED ORAL DAILY
Status: DISCONTINUED | OUTPATIENT
Start: 2024-11-30 | End: 2024-12-02 | Stop reason: HOSPADM

## 2024-11-30 RX ORDER — HYOSCYAMINE SULFATE 0.38 MG/1
0.38 TABLET, EXTENDED RELEASE ORAL EVERY 12 HOURS PRN
Status: DISCONTINUED | OUTPATIENT
Start: 2024-11-30 | End: 2024-12-02 | Stop reason: HOSPADM

## 2024-11-30 RX ADMIN — DOCUSATE SODIUM 100 MG: 100 CAPSULE, LIQUID FILLED ORAL at 14:19

## 2024-11-30 RX ADMIN — APIXABAN 2.5 MG: 5 TABLET, FILM COATED ORAL at 20:00

## 2024-11-30 RX ADMIN — APIXABAN 2.5 MG: 5 TABLET, FILM COATED ORAL at 10:09

## 2024-11-30 RX ADMIN — PRAMIPEXOLE DIHYDROCHLORIDE 1.5 MG: 0.25 TABLET ORAL at 19:59

## 2024-11-30 RX ADMIN — TRAZODONE HYDROCHLORIDE 25 MG: 50 TABLET ORAL at 20:00

## 2024-11-30 RX ADMIN — OXYCODONE HYDROCHLORIDE 5 MG: 5 TABLET ORAL at 20:00

## 2024-11-30 RX ADMIN — ZOLPIDEM TARTRATE 5 MG: 5 TABLET, COATED ORAL at 20:00

## 2024-11-30 RX ADMIN — ACETAMINOPHEN 650 MG: 325 TABLET, FILM COATED ORAL at 07:47

## 2024-11-30 RX ADMIN — PANTOPRAZOLE SODIUM 40 MG: 40 TABLET, DELAYED RELEASE ORAL at 08:03

## 2024-11-30 RX ADMIN — ROPINIROLE HYDROCHLORIDE 1 MG: 0.5 TABLET, FILM COATED ORAL at 20:00

## 2024-11-30 RX ADMIN — HYDROCHLOROTHIAZIDE 25 MG: 25 TABLET ORAL at 10:09

## 2024-11-30 ASSESSMENT — PAIN DESCRIPTION - FREQUENCY: FREQUENCY: INTERMITTENT

## 2024-11-30 ASSESSMENT — PAIN DESCRIPTION - ORIENTATION
ORIENTATION: ANTERIOR
ORIENTATION: RIGHT;LOWER

## 2024-11-30 ASSESSMENT — PAIN SCALES - GENERAL
PAINLEVEL_OUTOF10: 4
PAINLEVEL_OUTOF10: 0
PAINLEVEL_OUTOF10: 3
PAINLEVEL_OUTOF10: 0

## 2024-11-30 ASSESSMENT — PAIN DESCRIPTION - DESCRIPTORS
DESCRIPTORS: ACHING;DULL
DESCRIPTORS: ACHING

## 2024-11-30 ASSESSMENT — PAIN DESCRIPTION - ONSET: ONSET: GRADUAL

## 2024-11-30 ASSESSMENT — PAIN DESCRIPTION - PAIN TYPE: TYPE: ACUTE PAIN;SURGICAL PAIN

## 2024-11-30 ASSESSMENT — PAIN DESCRIPTION - LOCATION
LOCATION: HEAD
LOCATION: BACK

## 2024-11-30 ASSESSMENT — PAIN - FUNCTIONAL ASSESSMENT
PAIN_FUNCTIONAL_ASSESSMENT: ACTIVITIES ARE NOT PREVENTED
PAIN_FUNCTIONAL_ASSESSMENT: ACTIVITIES ARE NOT PREVENTED

## 2024-11-30 NOTE — FLOWSHEET NOTE
11/30/24 1358   Colostomy LLQ   Placement Date/Time: (c) 11/13/24 2212   Present on Admission/Arrival: Yes  Location: LLQ   Stomal Appliance Changed   Stoma  Assessment Red;Moist   Peristomal Assessment Clean, dry & intact   Mucocutaneous Junction Intact   Treatment Barrier ring;Heat applied;Stoma powder;Liquid skin barrier   Stool Appearance Formed   Stool Color Brown   Stool Amount Small     Ostomy appliance changed as previous appliance was not adhering. Stoma is red, moist in appearance. Skin intact around stoma. Skin prep, stoma powder, barrier ring, used. As well as reinforced strip along top of appliance. Active bowel sounds. Semi formed stool, small amount returned with appliance application pressure. Ostomy education discussed with patient. Dr. Prince notified of formed stool, and patient c/o \"burping a lot.\"

## 2024-12-01 PROCEDURE — 6370000000 HC RX 637 (ALT 250 FOR IP): Performed by: INTERNAL MEDICINE

## 2024-12-01 PROCEDURE — 94761 N-INVAS EAR/PLS OXIMETRY MLT: CPT

## 2024-12-01 PROCEDURE — 97116 GAIT TRAINING THERAPY: CPT

## 2024-12-01 PROCEDURE — 1200000002 HC SEMI PRIVATE SWING BED

## 2024-12-01 RX ADMIN — DOCUSATE SODIUM 100 MG: 100 CAPSULE, LIQUID FILLED ORAL at 09:35

## 2024-12-01 RX ADMIN — TRAZODONE HYDROCHLORIDE 25 MG: 50 TABLET ORAL at 20:35

## 2024-12-01 RX ADMIN — PANTOPRAZOLE SODIUM 40 MG: 40 TABLET, DELAYED RELEASE ORAL at 09:36

## 2024-12-01 RX ADMIN — ROPINIROLE HYDROCHLORIDE 1 MG: 0.5 TABLET, FILM COATED ORAL at 20:35

## 2024-12-01 RX ADMIN — OXYCODONE HYDROCHLORIDE 5 MG: 5 TABLET ORAL at 20:34

## 2024-12-01 RX ADMIN — APIXABAN 2.5 MG: 5 TABLET, FILM COATED ORAL at 20:35

## 2024-12-01 RX ADMIN — HYDROCHLOROTHIAZIDE 25 MG: 25 TABLET ORAL at 09:35

## 2024-12-01 RX ADMIN — PRAMIPEXOLE DIHYDROCHLORIDE 1.5 MG: 0.25 TABLET ORAL at 20:35

## 2024-12-01 RX ADMIN — APIXABAN 2.5 MG: 5 TABLET, FILM COATED ORAL at 09:35

## 2024-12-01 ASSESSMENT — PAIN SCALES - GENERAL
PAINLEVEL_OUTOF10: 0
PAINLEVEL_OUTOF10: 0
PAINLEVEL_OUTOF10: 3
PAINLEVEL_OUTOF10: 0

## 2024-12-01 ASSESSMENT — PAIN DESCRIPTION - ORIENTATION: ORIENTATION: MID;LOWER

## 2024-12-01 ASSESSMENT — PAIN DESCRIPTION - LOCATION: LOCATION: BACK

## 2024-12-01 ASSESSMENT — PAIN DESCRIPTION - DESCRIPTORS: DESCRIPTORS: ACHING

## 2024-12-01 NOTE — PLAN OF CARE
[facility]  Phone: [phone]    Swingbed Occupational Therapy Plan of Care  OT Orders Received and Evaluation Complete    Date: 2024  Patient Name: Ruth Hutton        MRN: [id number]    : 1940  (84 y.o.)  Referring Practitioner: Dr. Casanova  Diagnosis: Weakness  Treatment Diagnosis: Weakness    Identified Problem Areas:     Performance deficits / Impairments: Decreased functional mobility , Decreased ADL status, Decreased strength, Decreased endurance, Decreased sensation, Decreased balance, Decreased vision/visual deficit, Decreased high-level IADLs, Decreased fine motor control, Decreased coordination     Justification for Skilled Services:     [x] Complete Daily Tasks Safely  [x] Improve Balance   [x] Return to Prior Level of Function  [x] Family/Caregiver Education    [x] Improve UE strength  [x] Patient Education: [x] Adaptive Equipment   [x] Home Exercise Program and Progression    Treatment Plan:     Times Per Week: 2-3x.  Discharge recommendation: Home with home health services    [] Modalities:  [x] Therapeutic Exercise   [x] Therapeutic Activity  [] Splinting:     [] Home Safety Evaluation         [x] ADL Retraining                       [] Muscle Re-education [] Cognitive Retraining            [] Sensory Integration  [x] Patient Education [x] Home Exercise Program [x] Fine Motor Coordination    Goals:     Short term goals:  Time Frame for Short Term Goals: 7 days (2024)  Short Term Goal 1: Patient will complete lower body dressing and/or bathing while using adaptive equipment/techniques PRN with SUP/SETUP.  Short Term Goal 2: To increase UE strength for ADLs and functional transfers, patient will engage in 10 minutes of BUE ther ex without the need for a rest break.  Short Term Goal 3: To increase independence and safety with I/ADLs, patient will tolerate static/dynamic standing x5 without LOB.  Short Term Goal 4: N/A  Short Term Goal 5: N/A    Long term 
  Problem: Discharge Planning  Goal: Discharge to home or other facility with appropriate resources  11/26/2024 0734 by Destiney Salazar, RN  Outcome: Progressing  11/25/2024 2002 by Monik Gilmore, RN  Outcome: Progressing     Problem: Safety - Adult  Goal: Free from fall injury  Outcome: Progressing     Problem: Pain  Goal: Verbalizes/displays adequate comfort level or baseline comfort level  Outcome: Progressing     
  Problem: Discharge Planning  Goal: Discharge to home or other facility with appropriate resources  11/27/2024 0121 by Amy Sy RN  Outcome: Progressing  11/27/2024 0121 by Amy Sy RN  Outcome: Progressing     Problem: ABCDS Injury Assessment  Goal: Absence of physical injury  11/27/2024 0121 by Amy Sy RN  Outcome: Progressing  11/27/2024 0121 by Amy Sy RN  Outcome: Progressing     Problem: Safety - Adult  Goal: Free from fall injury  11/27/2024 0121 by Amy Sy RN  Outcome: Progressing  11/27/2024 0121 by Amy Sy RN  Outcome: Progressing     Problem: Pain  Goal: Verbalizes/displays adequate comfort level or baseline comfort level  11/27/2024 0121 by Amy Sy RN  Outcome: Progressing  11/27/2024 0121 by Amy Sy RN  Outcome: Progressing     Problem: Nutrition Deficit:  Goal: Optimize nutritional status  11/27/2024 0121 by Amy Sy RN  Outcome: Progressing  11/27/2024 0121 by Amy Sy RN  Outcome: Progressing     Problem: Neurosensory - Adult  Goal: Achieves stable or improved neurological status  Outcome: Progressing  Goal: Absence of seizures  Outcome: Progressing  Goal: Remains free of injury related to seizures activity  Outcome: Progressing  Goal: Achieves maximal functionality and self care  Outcome: Progressing     Problem: Cardiovascular - Adult  Goal: Maintains optimal cardiac output and hemodynamic stability  Outcome: Progressing  Goal: Absence of cardiac dysrhythmias or at baseline  Outcome: Progressing     Problem: Skin/Tissue Integrity - Adult  Goal: Skin integrity remains intact  Outcome: Progressing  Goal: Incisions, wounds, or drain sites healing without S/S of infection  Outcome: Progressing  Goal: Oral mucous membranes remain intact  Outcome: Progressing     Problem: Musculoskeletal - Adult  Goal: Return mobility to safest level of function  Outcome: Progressing  Goal: Maintain proper alignment of affected body 
  Problem: Discharge Planning  Goal: Discharge to home or other facility with appropriate resources  11/28/2024 2256 by Trupti Peter RN  Outcome: Progressing  11/28/2024 1738 by Monik Salazar RN  Outcome: Progressing     Problem: ABCDS Injury Assessment  Goal: Absence of physical injury  11/28/2024 2256 by Trupti Peter RN  Outcome: Progressing  11/28/2024 1738 by Monik Salazar RN  Outcome: Progressing     Problem: Safety - Adult  Goal: Free from fall injury  11/28/2024 2256 by Trupti Peter RN  Outcome: Progressing  11/28/2024 1738 by Monik Salazar RN  Outcome: Progressing     Problem: Pain  Goal: Verbalizes/displays adequate comfort level or baseline comfort level  11/28/2024 2256 by Trupti Peter RN  Outcome: Progressing  11/28/2024 1738 by Monik Salazar RN  Outcome: Progressing     Problem: Nutrition Deficit:  Goal: Optimize nutritional status  11/28/2024 2256 by Trupti Peter RN  Outcome: Progressing  11/28/2024 1738 by Monik Salazar RN  Outcome: Progressing     Problem: Neurosensory - Adult  Goal: Achieves stable or improved neurological status  11/28/2024 2256 by Trupti Peter RN  Outcome: Progressing  11/28/2024 1738 by Monik Salazar RN  Outcome: Progressing  Goal: Absence of seizures  11/28/2024 2256 by Trupti Peter RN  Outcome: Progressing  11/28/2024 1738 by Monik Salazar RN  Outcome: Progressing  Goal: Remains free of injury related to seizures activity  11/28/2024 2256 by Trupti Peter RN  Outcome: Progressing  11/28/2024 1738 by Monik Salazar RN  Outcome: Progressing  Goal: Achieves maximal functionality and self care  11/28/2024 2256 by Trupti Peter RN  Outcome: Progressing  11/28/2024 1738 by Monik Salazar RN  Outcome: Progressing     Problem: Cardiovascular - Adult  Goal: Maintains optimal cardiac output and hemodynamic stability  11/28/2024 2256 by Trupti Peter RN  Outcome: Progressing  11/28/2024 1738 by Monik Salazar RN  Outcome: Progressing  Goal: Absence of 
  Problem: Discharge Planning  Goal: Discharge to home or other facility with appropriate resources  12/1/2024 0015 by Amy Sy RN  Outcome: Progressing     Problem: ABCDS Injury Assessment  Goal: Absence of physical injury  12/1/2024 0015 by Amy Sy RN  Outcome: Progressing     Problem: Safety - Adult  Goal: Free from fall injury  12/1/2024 0015 by Amy Sy RN  Outcome: Progressing     Problem: Pain  Goal: Verbalizes/displays adequate comfort level or baseline comfort level  12/1/2024 0015 by Amy Sy RN  Outcome: Progressing    Problem: Nutrition Deficit:  Goal: Optimize nutritional status  12/1/2024 0015 by Amy Sy RN  Outcome: Progressing    Problem: Neurosensory - Adult  Goal: Achieves stable or improved neurological status  12/1/2024 0015 by Amy Sy RN  Outcome: Progressing  Goal: Absence of seizures  12/1/2024 0015 by Amy Sy RN  Outcome: Progressing  Goal: Remains free of injury related to seizures activity  12/1/2024 0015 by Amy Sy RN  Outcome: Progressing  Goal: Achieves maximal functionality and self care  12/1/2024 0015 by Amy Sy RN  Outcome: Progressing     Problem: Cardiovascular - Adult  Goal: Maintains optimal cardiac output and hemodynamic stability  12/1/2024 0015 by Amy Sy RN  Outcome: Progressing  Goal: Absence of cardiac dysrhythmias or at baseline  12/1/2024 0015 by Amy Sy RN  Outcome: Progressing    Problem: Skin/Tissue Integrity - Adult  Goal: Skin integrity remains intact  12/1/2024 0015 by Amy Sy RN  Outcome: Progressing  Goal: Incisions, wounds, or drain sites healing without S/S of infection  12/1/2024 0015 by Amy Sy RN  Outcome: Progressing    Goal: Oral mucous membranes remain intact  12/1/2024 0015 by Amy Sy RN  Outcome: Progressing    Problem: Musculoskeletal - Adult  Goal: Return mobility to safest level of function  12/1/2024 0015 by Amy Sy 
  Problem: Discharge Planning  Goal: Discharge to home or other facility with appropriate resources  12/1/2024 0824 by Tomeka Monet RN  Outcome: Progressing  12/1/2024 0015 by Amy Sy RN  Outcome: Progressing  12/1/2024 0014 by Amy Sy RN  Outcome: Not Progressing     Problem: ABCDS Injury Assessment  Goal: Absence of physical injury  12/1/2024 0015 by Amy Sy RN  Outcome: Progressing  12/1/2024 0014 by Amy Sy RN  Outcome: Not Progressing     Problem: Safety - Adult  Goal: Free from fall injury  12/1/2024 0824 by Tomeka Monet RN  Outcome: Progressing  12/1/2024 0015 by Amy Sy RN  Outcome: Progressing  12/1/2024 0014 by Amy Sy RN  Outcome: Not Progressing     Problem: Pain  Goal: Verbalizes/displays adequate comfort level or baseline comfort level  12/1/2024 0824 by Tomeka Monet RN  Outcome: Progressing  12/1/2024 0015 by Amy Sy RN  Outcome: Progressing  12/1/2024 0014 by Amy Sy RN  Outcome: Not Progressing     Problem: Nutrition Deficit:  Goal: Optimize nutritional status  12/1/2024 0015 by Amy Sy RN  Outcome: Progressing  12/1/2024 0014 by Amy Sy RN  Outcome: Not Progressing     Problem: Neurosensory - Adult  Goal: Achieves stable or improved neurological status  12/1/2024 0015 by Amy Sy RN  Outcome: Progressing  12/1/2024 0014 by Amy Sy RN  Outcome: Not Progressing  Goal: Absence of seizures  12/1/2024 0015 by Amy Sy RN  Outcome: Progressing  12/1/2024 0014 by Amy Sy RN  Outcome: Not Progressing  Goal: Remains free of injury related to seizures activity  12/1/2024 0015 by Amy Sy RN  Outcome: Progressing  12/1/2024 0014 by Amy Sy RN  Outcome: Not Progressing  Goal: Achieves maximal functionality and self care  12/1/2024 0015 by Amy Sy, RN  Outcome: Progressing  12/1/2024 0014 by Amy Sy, RN  Outcome: Not Progressing     Problem: 
  Problem: Discharge Planning  Goal: Discharge to home or other facility with appropriate resources  Outcome: Progressing     Problem: ABCDS Injury Assessment  Goal: Absence of physical injury  Outcome: Progressing     Problem: Safety - Adult  Goal: Free from fall injury  11/23/2024 1009 by Jessica Loza RN  Outcome: Progressing  11/22/2024 2214 by Joann Cantu RN  Outcome: Progressing     Problem: Pain  Goal: Verbalizes/displays adequate comfort level or baseline comfort level  11/23/2024 1009 by Jessica Loza RN  Outcome: Progressing  11/22/2024 2214 by Joann Cantu RN  Outcome: Progressing     
  Problem: Discharge Planning  Goal: Discharge to home or other facility with appropriate resources  Outcome: Progressing     Problem: ABCDS Injury Assessment  Goal: Absence of physical injury  Outcome: Progressing     Problem: Safety - Adult  Goal: Free from fall injury  Outcome: Progressing     Problem: Pain  Goal: Verbalizes/displays adequate comfort level or baseline comfort level  Outcome: Progressing     
  Problem: Discharge Planning  Goal: Discharge to home or other facility with appropriate resources  Outcome: Progressing     Problem: ABCDS Injury Assessment  Goal: Absence of physical injury  Outcome: Progressing     Problem: Safety - Adult  Goal: Free from fall injury  Outcome: Progressing     Problem: Pain  Goal: Verbalizes/displays adequate comfort level or baseline comfort level  Outcome: Progressing     Problem: Nutrition Deficit:  Goal: Optimize nutritional status  Outcome: Progressing     Problem: Neurosensory - Adult  Goal: Achieves stable or improved neurological status  Outcome: Progressing  Goal: Absence of seizures  Outcome: Progressing  Goal: Remains free of injury related to seizures activity  Outcome: Progressing  Goal: Achieves maximal functionality and self care  Outcome: Progressing     Problem: Cardiovascular - Adult  Goal: Maintains optimal cardiac output and hemodynamic stability  Outcome: Progressing  Goal: Absence of cardiac dysrhythmias or at baseline  Outcome: Progressing     Problem: Skin/Tissue Integrity - Adult  Goal: Skin integrity remains intact  Outcome: Progressing  Goal: Incisions, wounds, or drain sites healing without S/S of infection  Outcome: Progressing  Goal: Oral mucous membranes remain intact  Outcome: Progressing     Problem: Musculoskeletal - Adult  Goal: Return mobility to safest level of function  Outcome: Progressing  Goal: Maintain proper alignment of affected body part  Outcome: Progressing  Goal: Return ADL status to a safe level of function  Outcome: Progressing     Problem: Gastrointestinal - Adult  Goal: Minimal or absence of nausea and vomiting  Outcome: Progressing  Goal: Maintains or returns to baseline bowel function  Outcome: Progressing  Goal: Maintains adequate nutritional intake  Outcome: Progressing  Goal: Establish and maintain optimal ostomy function  Outcome: Progressing     Problem: Genitourinary - Adult  Goal: Absence of urinary retention  Outcome: 
  Problem: Discharge Planning  Goal: Discharge to home or other facility with appropriate resources  Outcome: Progressing     Problem: Safety - Adult  Goal: Free from fall injury  Outcome: Progressing     Problem: Neurosensory - Adult  Goal: Achieves stable or improved neurological status  Outcome: Progressing     
  Problem: Pain  Goal: Verbalizes/displays adequate comfort level or baseline comfort level  11/23/2024 2218 by Joann Cantu, RN  Outcome: Not Progressing  11/23/2024 1009 by Jessica Loza, RN  Outcome: Progressing       Problem: Safety - Adult  Goal: Free from fall injury  11/23/2024 2218 by Joann Cantu, RN  Outcome: Progressing  11/23/2024 1009 by Jessica Loza, RN  Outcome: Progressing     
  Problem: Physical Therapy - Adult  Goal: By Discharge: Performs mobility at highest level of function for planned discharge setting.  See evaluation for individualized goals.  Outcome: Progressing     
  Problem: Safety - Adult  Goal: Free from fall injury  Outcome: Progressing     Problem: Pain  Goal: Verbalizes/displays adequate comfort level or baseline comfort level  Outcome: Progressing     
[facility]  Inpatient Physical Therapy  Plan of Care  Date: 2024  Patient Name: Ruth Hutton        : 1940  (84 y.o.)     Admission Date: 2024  Referral Date : 24  Diagnosis: Weakness  Treatment Diagnosis: Weakness  PT Orders Received and Evaluation Complete  Identified Problem Areas:  Therapy Prognosis: Excellent  Performance Deficits/Impairments: Decreased functional mobility , Decreased ADL status, Decreased ROM, Decreased body mechanics, Decreased strength, Increased pain, Decreased posture    Justification for Skilled Services:  [x] Reduce Falls   [x] Improve Ambulation  [x]  Complete Daily Tasks Safely   [x] Improve Balance   [x] Improve LE strength  [x]  Return to Prior Level of Function  [x] Improve Functional Mobility   []  Family/Caregiver Education  [] Patient Education: [x]Assistive Devices [x]Home Exercise Program and Progression    Plan  Frequency: 1-2x/day Daily M-F, 1x/day Sat-Sun    Duration: 7 days  [] Modalities:  [x] Therapeutic Exercise   [x] Gait Training  [x] Therapeutic Activity    [] Home Safety Evaluation         [] Massage                        [x] Neuromuscular Re-education [] Back Education             [x] Patient Education [x] Home Exercise Program       Rehab Potential:  [x]  Good [] Fair   []  Poor    Goals  Short Term Goals  Time Frame for Short Term Goals: 3 days (24)  Short Term Goal 1: Patient will ascend/descend 3 stairs with right rail only and SBA  Short Term Goal 2: Patient will transfer supine to sit with minimal cues and Mod I    Long Term Goals  Time Frame for Long Term Goals : 7 days (24)  Long Term Goal 1: Patient will ambulate x50ft without assistive device and Mod I  Long Term Goal 2: Patient will transfer from flat bed with no cues and Mod I    Upon Swingbed Transfer this Plan of Care will be followed until revision is complete.    Akira Stallworth, PT, PT   Date: 2024    
Progressing     Problem: Anxiety  Goal: Will report anxiety at manageable levels  Description: INTERVENTIONS:  1. Administer medication as ordered  2. Teach and rehearse alternative coping skills  3. Provide emotional support with 1:1 interaction with staff  Outcome: Progressing     Problem: Coping  Goal: Pt/Family able to verbalize concerns and demonstrate effective coping strategies  Description: INTERVENTIONS:  1. Assist patient/family to identify coping skills, available support systems and cultural and spiritual values  2. Provide emotional support, including active listening and acknowledgement of concerns of patient and caregivers  3. Reduce environmental stimuli, as able  4. Instruct patient/family in relaxation techniques, as appropriate  5. Assess for spiritual pain/suffering and initiate Spiritual Care, Psychosocial Clinical Specialist consults as needed  Outcome: Progressing

## 2024-12-02 VITALS
SYSTOLIC BLOOD PRESSURE: 129 MMHG | HEIGHT: 62 IN | TEMPERATURE: 97.5 F | RESPIRATION RATE: 18 BRPM | DIASTOLIC BLOOD PRESSURE: 53 MMHG | OXYGEN SATURATION: 93 % | WEIGHT: 144.4 LBS | BODY MASS INDEX: 26.57 KG/M2 | HEART RATE: 82 BPM

## 2024-12-02 PROCEDURE — 6370000000 HC RX 637 (ALT 250 FOR IP): Performed by: INTERNAL MEDICINE

## 2024-12-02 PROCEDURE — 94761 N-INVAS EAR/PLS OXIMETRY MLT: CPT

## 2024-12-02 RX ORDER — PSEUDOEPHEDRINE HCL 30 MG
100 TABLET ORAL DAILY
Qty: 30 CAPSULE | Refills: 1 | Status: SHIPPED | OUTPATIENT
Start: 2024-12-02

## 2024-12-02 RX ORDER — TRAZODONE HYDROCHLORIDE 50 MG/1
50 TABLET, FILM COATED ORAL NIGHTLY PRN
Qty: 30 TABLET | Refills: 5 | Status: SHIPPED | OUTPATIENT
Start: 2024-12-02

## 2024-12-02 RX ORDER — ONDANSETRON 8 MG/1
8 TABLET, ORALLY DISINTEGRATING ORAL EVERY 8 HOURS PRN
Qty: 20 TABLET | Refills: 0 | Status: SHIPPED | OUTPATIENT
Start: 2024-12-02

## 2024-12-02 RX ORDER — OXYCODONE HYDROCHLORIDE 5 MG/1
5 TABLET ORAL EVERY 6 HOURS PRN
Qty: 20 TABLET | Refills: 0 | Status: SHIPPED | OUTPATIENT
Start: 2024-12-02 | End: 2024-12-07

## 2024-12-02 RX ORDER — HYOSCYAMINE SULFATE 0.38 MG/1
0.38 TABLET, EXTENDED RELEASE ORAL EVERY 12 HOURS PRN
Qty: 60 TABLET | Refills: 3 | Status: SHIPPED | OUTPATIENT
Start: 2024-12-02

## 2024-12-02 RX ADMIN — APIXABAN 2.5 MG: 5 TABLET, FILM COATED ORAL at 07:59

## 2024-12-02 RX ADMIN — HYDROCHLOROTHIAZIDE 25 MG: 25 TABLET ORAL at 07:59

## 2024-12-02 RX ADMIN — DOCUSATE SODIUM 100 MG: 100 CAPSULE, LIQUID FILLED ORAL at 07:59

## 2024-12-02 RX ADMIN — PANTOPRAZOLE SODIUM 40 MG: 40 TABLET, DELAYED RELEASE ORAL at 07:59

## 2024-12-02 RX ADMIN — ZOLPIDEM TARTRATE 5 MG: 5 TABLET, COATED ORAL at 02:42

## 2024-12-02 ASSESSMENT — PAIN SCALES - GENERAL: PAINLEVEL_OUTOF10: 0

## 2024-12-02 NOTE — PROGRESS NOTES
[facility]  Phone: [phone]    Occupational Therapy Skilled Daily Note  Date: 2024  Patient Name: Ruth Hutton        MRN: [id number]    : 1940  (84 y.o.)    Subjective:     Subjective: Patient was lying supine in bed upon OT arrival. Was agreeable to OT interventions.    Pt is PROGRESSING toward goals and independence of Self Care this treatment session    Discharge recommendation: Continue to assess Pending Progress    Objective:     ADLs:  Grooming: Supervision (washing hands in standing)  Toileting: Supervision, Minimal assistance (SUP: clothing management in standing; MIN A: emptying ostomy bag)    Transfers:  Supine to Sit: Supervision  Sit to stand: Supervision   Stand to sit: Supervision  Toilet Transfer: Supervision (via toilet in bathroom)    Assessment:     Assessment: Patient was lying supine in bed with family present upon OT arrival. Was agreeable to OT interventions. Completed ADLs and functional transfers as documented above. Requested to stay in hospital room for therapy session. Performed functional mobility from EOB to bedside recliner via FWW with SUP. Once seated in recliner, patient engaged in ~7 minutes of BUE ther ex via a 1 pound weight as outlined in the exercise flowsheet to facilitate increased UE strength for ADLs and functional transfers. Patient required brief rest breaks in between each exercise secondary to fatigue and low back pain. Following this exercise bout, patient utilized the restroom with documented levels of assistance. Patient remains in recliner with call light/personal items within reach and sister present upon OT departure.    Exercises:     See Flowsheets    Goals:     Short term goals:  Time Frame for Short Term Goals: 7 days (2024)  Short Term Goal 1: Patient will complete lower body dressing and/or bathing while using adaptive equipment/techniques PRN with SUP/SETUP.  Short Term Goal 2: To increase UE strength for ADLs and 
     [facility]  Phone: [phone]    Occupational Therapy Skilled Daily Note  Date: 2024  Patient Name: Ruth Hutton        MRN: [id number]    : 1940  (84 y.o.)    Subjective:     Subjective: Patient was sitting in recliner with family present upon OT arrival. Was agreeable to OT interventions.    Pt is PROGRESSING toward goals and independence of Self Care this treatment session    Discharge recommendation: Continue to assess Pending Progress    Objective:     ADLs:  Grooming: Stand by assistance (applying deodorant in sitting)  UE Bathing: Stand by assistance (via shower in sitting)  LE Bathing: Stand by assistance (via shower in sitting)  UE Dressing: Stand by assistance (donning hospital gown in sitting)  LE Dressing: Stand by assistance, Dependent/Total (SBA: doffing brief in sitting/standing and doffing bilateral socks in sitting; DEP: donning bilateral socks in sitting - patient declined to complete due to leaking ostomy.)    Transfers:  Sit to stand: Supervision   Stand to sit: Supervision  Toilet Transfer: Supervision  Shower Transfers: Contact guard assistance    Assessment:     Assessment: Patient was sitting in recliner with family present upon OT arrival. Was agreeable to OT interventions. Completed ADLs (including bath) and functional transfers as documented above. Performed functional mobility to/from bathroom via FWW with SBA. Tolerated ADL session well, but this therapist noticed ostomy leakage following bathing task. Notified RN, who completed ostomy care and instructed patient to lie flat in bed to allow ostomy bag to adhere to skin. Patient remains in bed with call light/personal items within reach upon OT departure. Will continue to address goals and progress patient as able/tolerated.    Exercises:     N/A    Goals:     Short term goals:  Time Frame for Short Term Goals: 7 days (2024)  Short Term Goal 1: Patient will complete lower body dressing and/or bathing 
Acknowledge pt discharge to home today.  confirmed that pt was discharging this date with [facility] and they have her scheduled for start of care on Thursday as pt had requested. Pt has prescriptions for needed ostomy supplies and HH will help her to order when they are getting ready to discharge. Discharge summary and HH orders faxed to [facility]. No further discharge needs identified. Tracey Feldman, MSW, LSW 12/2/2024   
Education on colostomy continues this shift from yesterday. This wirter uses  teach back demonstration to empty colostomy bag. Patient performs per self. Pt does very well and  is able to open, empty and close ostomy bag.   
[facility]  Physical Therapy    Date: 2024  Patient Name: Ruth Hutton        : 1940       [x] Pt Refusal Patient in bed upon arrival to room. She declines working with PTA at this time stating that she does not feel good still. Her stomach is bothering her and she would just like to rest. Will check back later and progess as able.           [] Pt Unavailable due to:          Damaris May PTA Date: 2024    
HARJIT met with pt and provided Medicare Notice of Non Coverage letter to pt. Discharge of skilled services planned for Sunday 12/1/2024 and discharge to home with [facility] planned for Monday 12/2/2024. Pt plans to leave by 9 am so she is able to get to her appointment in [facility] at 1130 am. Pt signs letter and this is copied and provided to paper chart and to pt. Pt in agreement with discharge plans. Pt has requested HH services to begin on Thursday 12/5/2024 and HH is aware. SW following. Tracey RUSHINGW 11/27/2024   
[facility]  Occupational Therapy    Date: 2024  Patient Name: Ruth Hutton        : 1940       [] Pt Refusal           [x] Pt Unavailable due to: talking with Sister Paloma. Will re-attempt OT evaluation later today as able.         Cony Krueger, OTR/L   Date: 2024  
Hospitalist Progress Note  11/29/2024 7:29 AM  Subjective:   Admit Date: 11/22/2024  PCP: Lary Liriano MD    Interval History: Ruth has no complaints this morning. Her pain is controlled on pain medication.  No chest pain or SOB.  Appetite is fair, no nausea.  She states she still urinates a lot.      Diet: ADULT DIET; Regular  ADULT ORAL NUTRITION SUPPLEMENT; Breakfast, Lunch, Dinner; Standard High Calorie/High Protein Oral Supplement  Medications:   Scheduled Meds:   amoxicillin-clavulanate  875 mg Oral 2 times per day    traZODone  25 mg Oral Nightly    rOPINIRole  1 mg Oral Nightly    sodium chloride flush  5-40 mL IntraVENous 2 times per day    apixaban  2.5 mg Oral BID    hydroCHLOROthiazide  25 mg Oral Daily    pantoprazole  40 mg Oral Daily    pramipexole  1.5 mg Oral Nightly     Continuous Infusions:   sodium chloride         Patient's current medications documented, reviewed, and updated.      CBC: No results for input(s): \"WBC\", \"HGB\", \"PLT\" in the last 72 hours.  BMP:  No results for input(s): \"NA\", \"K\", \"CL\", \"CO2\", \"BUN\", \"CREATININE\", \"GLUCOSE\" in the last 72 hours.  Hepatic: No results for input(s): \"AST\", \"ALT\", \"BILITOT\", \"ALKPHOS\" in the last 72 hours.    Invalid input(s): \"ALB\"  Troponin: No results for input(s): \"TROPONINI\" in the last 72 hours.  BNP: No results for input(s): \"BNP\" in the last 72 hours.  Lipids: No results for input(s): \"CHOL\", \"HDL\" in the last 72 hours.    Invalid input(s): \"LDLCALCU\"  INR: No results for input(s): \"INR\" in the last 72 hours.      Objective:   Vitals: BP (!) 149/59   Pulse 72   Temp 98.3 °F (36.8 °C) (Oral)   Resp 18   Ht 1.575 m (5' 2\")   Wt 66.8 kg (147 lb 4.8 oz)   SpO2 97%   BMI 26.94 kg/m²   General appearance: alert and cooperative with exam  HEENT: Head: Normocephalic, no lesions, without obvious abnormality.  Eye: Normal external eye, conjunctiva, lids cornea, ARCADIO.  Nose: Normal external nose, mucus membranes and septum.  Neck: no 
Occupational Therapy    Assisted patient with donning brief and brushing teeth. Patient denied any further needs at this time. Patient remains in recliner with call light/personal items within reach upon OT departure. Notified REJI.     Cony Krueger, OTR/L  Date: 11/26/2024  NO CHARGE   
[facility]  Swing Bed Evaluation for Certification for Skilled Care    Ruth Hutton meets skilled criteria due to the need for skilled nursing supervision or skilled rehabilitation services listed below:   [x] Therapy; physical and occupational; for decreased strength, balance and self         care activities.   [] Tpn    [] Trach care   [] IV therapy   [] Wound care    [] Other Skilled Need:        Ruth Hutton lives [] Alone      [x] With Spouse    [] Other:   and plans on returning there at discharge.     [] Pt declines list of alternate providers, pt prefers to receive skilled care at [facility]  [] List of alternate providers given to patient, pt prefers to receive skilled care at [facility]  [x] Not applicable, pt admitted to [facility] from Outside Facility    Ruth Hutton prefers this facility for skilled care and services can only be practically provided in a skilled nursing facility or swing bed [facility] on an inpatient basis. Ruth Hutton will require skilled care on a daily basis beginning 11/22/2024, if medically stable.  These services are for an ongoing condition for which Ruth Hutton received inpatient care for at the hospital.        Yadi Knowles,      Date: 11/22/2024  
[facility]  Swing Bed Interdisciplinary Care Plan Conference Report   Recertification for Continued Skilled Care.    Patients name:Ruth Hutton  Date of Conference: 11/25/2024    The Following Information was discussed and agreed upon with the patient and/or Caregivers as listed below.    Names of Team Members and Caregivers present for meeting:  : PEDRO Holguin  Nursing: ARSENIO Lafleur  Therapy: , PT; DENISE Krueger, L/OTR  Dietician:   Activities: Yadi Knowles  Pastoral Care:   Caregivers:    [x] Spouse  [] Child/Children [] Significant Other   [] Other:     Nutrition:  Current Body Weight:   Wt Readings from Last 1 Encounters:   11/23/24 66.5 kg (146 lb 9.7 oz)     Weight Change: 10# less than former values of 157#   Current Diet order:ADULT DIET; Regular  Intakes: % of requested smaller portions. Chocolate supplement initiating today.   Diet Education Provided: enocuragement of meals and supplement at end of meals.   Goal: PO >75% meals and supplements    Spiritual:  Religion needs met: [x] Yes  [] No  [] N/A  Notified  or  of admission: [] Yes  [] No  [] N/A  Patient added to Communion List: [] Yes  [] No  [] N/A  Any other concerns or comments:   Goal: Participate 2-3 times per week    Occupational Therapy:  Current ADL Status:   Safety:   Recommendations for adaptive equipment:    [] Long handled sponge   [] Long Handled Shoe Horn  [] Extended Tub Bench       Physical Therapy:  Transfers:   Transfers  Sit to Stand: Modified independent  Stand to Sit: Modified independent  Bed to Chair: Modified independent  Stand Pivot Transfers: Modified independent  Mobility/Ambulation:   Ambulation  Surface: Level tile  Device: Rolling Walker  Assistance: Modified Independent  Quality of Gait: Mildly shortened step length  Distance: 150ft  Equipment:   [x] Rolling Walker   [] Straight Cane  [] Standard Walker  Safety: Good  Short Term Goals:  Time Frame for Short Term Goals: 3 days 
[facility]  Occupational Therapy    Date: 2024  Patient Name: Ruth Hutton        : 1940       [x] Pt Refusal: due to not feeling well. Will re-attempt OT interventions as able.                Cony Krueger, OTR/L Date: 2024  
Ostomy bag removed due to leaking around site. Stoma is red with output of stool. Ostomy bag replaced. Patient educated and encouraged to assist.  
Patient ambulated in hallway about 500 feet with use of front wheeled walker, supervision assist. Gait steady.  
Patient arrives to unit via cart. Assist x 1 staff to chair. Oriented to room, call light system, bed rails, phone, lights, dry erase board and bathroom. Vital signs obtained. Nursing assessment complete. Orders reviewed with patient. Patient instructed about the schedule of the day including: vital sign frequency, lab draws, possible tests, frequency of MD and staff rounds, daily weights, I &O's and prescribed diet. Patient is alert and oriented to time, place, person and situation. Admission questions answered with Patient as source. Patient is a high fall risk, discussed such with patient, as well as, fall prevention strategies. Family at bedside. Ice water provided. All care needs addressed with no further needs at this time. Patient is currently resting in chair with brakes locked, in lowest position, side rails up 2/4, call light and bedside table within reach.     VITALS:  BP (!) 142/58   Pulse 87   Temp 98.2 °F (36.8 °C) (Oral)   Resp 16   SpO2 96%     Implemented/recommended use of non-skid footwear, Implemented/recommended assistive devices and encouraged their use, Implemented/recommended resources for alarm system (personal alarm, bed alarm, call bell, etc.) , and Implemented/recommended environmental changes (remove hazards, lower bed, improve lighting, etc.)      
Patient c/o feeling her \"stomach rubbling\" and a headache. Dr. Prince made aware, see orders. PRN Tylenol and scheduled protonix given.   
Patient c/o of 3/10 right lower back pain and restless legs. She also complains that her body \"will not rest\" and requests something to assist with sleeping.     Dr. Casanova notified of patient issues as well as her medication pramipexole being out of stock per .  pharmacy.    New orders received and given.  
Patient calls nurse informing that she still cannot get to sleep. We ambulate into bathroom to void, she then requests to walk in neri. We ambulate around entire unit x1 lap with walker, no hands-on assistance needed.     We return to bed with alarm placed on and call light within reach.   
Patient informed of all follow up appointment details.   
Patient's son brought a to [facility] a filled prescription for sulfamethoxazole-tmp ds by mouth one tablet in the morning and 1 tablet before bedtime, 6 tablets total that was prescribed by Dr. Kayy Britton. This writer called Dr. Hector Ortega's office, spoke with Stephanie who informed this writer that Dr. Kayy Britton spoke with patient's son and told him that patient's urine culture done at [facility] was positive for a UTI and she sent prescription to [facility].    Patient reports that she has already taken 1 dose when he brought the medication in.    Message sent via secured messaging awaiting response.   
Phone: [phone] [facility]  Date: 2024  Fax: [phone]      Physical Therapy    Daily Note    Patient Name: Ruth Hutton      : 1940  (84 y.o.)  MRN: [id number]     Pt is PROGRESSING toward goals and increased independence of mobility this treatment session        Assessment                     Sit to Stand: Modified independent (required verbal cues x 1 for proper hand placement)  Stand to Sit: Modified independent  Bed to Chair: Modified independent             WB Status: WBAT  Ambulation  Surface: Level tile  Device: Rolling Walker  Assistance: Modified Independent  Quality of Gait: Mildly shortened step length  Distance: 75 ft x2 (patient room to therapy room back to patient room)       Assessment: Patient in chair upon arrival.  She agrees to work with PTA. Sit to stand from chair is mod I.  Ambulates with wheeled walker to therapy room. No LOB noted with gait. Completes seated and standing exercises as outlined above and notes fatigue. She returns to room following to lie back down. Sit to supine is mod I. In bed following with call light in reach.  Safety Devices  Type of Devices: Call light within reach, Gait belt, Left in bed, Nurse notified          Call light in reach, Phone in reach, Use of Gait belt, Nurse Notified, and Left in bed  Time In: 1259  Time Out: 1325  Timed Coded Minutes: 26  Total Treatment Time: 26      Exercises:  See Flowsheets    Plan  Cont Per Plan Of Care    Goals  Short Term Goals  Time Frame for Short Term Goals: 3 days (24)  Short Term Goal 1: Patient will ascend/descend 3 stairs with right rail only and SBA-MET  Short Term Goal 2: Patient will transfer supine to sit with minimal cues and Mod I-MET             Long Term Goals  Time Frame for Long Term Goals : 7 days (24)  Long Term Goal 1: Patient will ambulate x50ft without assistive device and Mod I  Long Term Goal 2: Patient will transfer from flat bed with no cues and Mod I         
Phone: [phone] [facility]  Date: 2024  Fax: [phone]      Physical Therapy    Daily Note    Patient Name: Ruth Hutton      : 1940  (84 y.o.)  MRN: [id number]     Pt is PROGRESSING toward goals and increased independence of mobility this treatment session        Assessment  Bed mobility  Supine to Sit: Supervision        Supine to Sit: Modified independent  Sit to Supine: Independent       Sit to Stand: Independent  Stand to Sit: Independent  Bed to Chair: Modified independent             WB Status: WBAT  Ambulation  Surface: Level tile  Device: Rolling Walker  Assistance: Independent  Quality of Gait: WFL  Distance: 200 ftx1        Stairs  # Steps : 10  Stairs Height: 4\" (and 6\")  Rails: Right ascending  Assistance: Modified independent   Comment: Step through pattern    Assessment: Patient seated in chair on arrival. Patient c/o lane and bloating in stomach, making belly hurt. Patient agreed to walk but refused ex. She transfered sit to stand independent. She ambulated with w.walker 200 ftx1 independently, good balance and normal stride length. On return to room she used toilet with transfering on and off toilet and managing depends independently. She washed hands at sink with good balance independently. Patient ambulated back to chair without device with SBA about 10 feet. Patient remained seated in chair with call bell and  present.  Safety Devices  Type of Devices: Call light within reach, Gait belt          Call light in reach and Family Present  Time In: 9:00  Time Out: 9:15  Timed Coded Minutes: 15  Total Treatment Time: 15    Exercises:  See Flowsheets    Plan  Cont Per Plan Of Care    Goals  Short Term Goals  Time Frame for Short Term Goals: 3 days (24)  Short Term Goal 1: Patient will ascend/descend 3 stairs with right rail only and SBA-MET  Short Term Goal 2: Patient will transfer supine to sit with minimal cues and Mod I-MET             Long Term Goals  Time 
Phone: [phone] [facility]  Date: 2024  Fax: [phone]      Physical Therapy    Daily Note    Patient Name: Ruth Hutton      : 1940  (84 y.o.)  MRN: [id number]     Pt is PROGRESSING toward goals and increased independence of mobility this treatment session        Assessment                      Sit to Stand: Modified independent  Stand to Sit: Modified independent               WB Status: WBAT  Ambulation  Surface: Level tile  Device: Rolling Walker  Assistance: Modified Independent  Quality of Gait: Mildly shortened step length  Distance: 75 ft x 2 (patient room to therapy room back to patient room)        Stairs  # Steps : 10  Stairs Height: 4\" (and 6\")  Rails: Right ascending  Assistance: Modified independent   Comment: Step through pattern    Assessment: Patient seated up in chair with  present in room.  Sit to stand from chair is mod I.  She does not require verbal cues today for proper hand placement.  Ambulates to therapy room wiht wheeled walker and mod I.  Demonstrates no LOB.  Completes standing and seated exercises as outlined above.  Up/down steps 4\" and 6\" in height with R ascending railing with Mod I.  Ambulates back to room following to sit up in chair for lunch.  Call light in reach and nurse in room with patient.  Safety Devices  Type of Devices: Call light within reach, Gait belt, Left in chair, Nurse notified          Call light in reach, Phone in reach, Use of Gait belt, Nurse Notified, Other Staff Present  , and Left in chair  Time In: 1105  Time Out: 1140  Timed Coded Minutes: 35  Total Treatment Time: 35      Exercises:  See Flowsheets    Plan  Cont Per Plan Of Care    Goals  Short Term Goals  Time Frame for Short Term Goals: 3 days (24)  Short Term Goal 1: Patient will ascend/descend 3 stairs with right rail only and SBA-MET  Short Term Goal 2: Patient will transfer supine to sit with minimal cues and Mod I-MET             Long Term Goals  Time Frame 
Phone: [phone] [facility]  Date: 2024  Fax: [phone]      Physical Therapy    Daily Note    Patient Name: Ruth Hutton      : 1940  (84 y.o.)  MRN: [id number]     Pt is PROGRESSING toward goals and increased independence of mobility this treatment session        Assessment                      Sit to Stand: Modified independent  Stand to Sit: Modified independent               WB Status: WBAT  Ambulation  Surface: Level tile  Device: Rolling Walker  Assistance: Modified Independent  Quality of Gait: Mildly shortened step length  Distance: 10 ft x1; 100 ftx1         Assessment: Patient in chair upon arrival to room. States that her stomach hurts and she does not wish to work with PTA.  She does request to go to bathroom. Sit to stand from chair is mod I.  Ambulates into bathroom to sit on commode wiht wheeled walker and Mod I.  Independent with ella care and demonstrates good standing balance to pull brief down and up and then to wash hands.  Agrees to take a short walk with PTA.  Gait with wheeled walker ~100 ft x1 and returns to room to sit back up in chair.  Call light in reach following.  Nurse into room to check on patient and she is updated on patient still not feeling well.  Safety Devices  Type of Devices: Call light within reach, Left in chair, Nurse notified          Call light in reach, Phone in reach, Nurse Notified, Other Staff Present  , and Left in chair  Time In: 1258  Time Out: 1315  Timed Coded Minutes: 17  Total Treatment Time: 17        Plan  Cont Per Plan Of Care    Goals  Short Term Goals  Time Frame for Short Term Goals: 3 days (24)  Short Term Goal 1: Patient will ascend/descend 3 stairs with right rail only and SBA-MET  Short Term Goal 2: Patient will transfer supine to sit with minimal cues and Mod I-MET             Long Term Goals  Time Frame for Long Term Goals : 7 days (24)  Long Term Goal 1: Patient will ambulate x50ft without assistive device 
Phone: [phone] [facility]  Date: 2024  Fax: [phone]      Physical Therapy    Daily Note    Patient Name: Ruth Hutton      : 1940  (84 y.o.)  MRN: [id number]     Pt is PROGRESSING toward goals and increased independence of mobility this treatment session     Assessment      Sit to Stand: Modified independent  Stand to Sit: Modified independent  Bed to Chair: Modified independent      WB Status: WBAT  Ambulation  Surface: Level tile  Device: Rolling Walker  Assistance: Modified Independent  Quality of Gait: Mildly shortened step length  Distance: 150 ft    Stairs  # Steps : 10  Stairs Height:  (4\" and 6\")  Rails: Right ascending  Assistance: Supervision  Comment: Step through pattern    Assessment: Pt up in chair upon arrival. Agreeable to PT session. STS from recliner mod(I). Gt with FWW x75ft to therapy room mod(I). Steps with right rail ascending and left rail descending 4\" step x5 and 6\" step x5 (S). Standing BLE strengthening ex's per flowsheet. Gt back to room with FWW mod(I) x75ft. Pt left in recliner with meal trail provided and call light in reach.       Call light in reach, Phone in reach, Use of Gait belt, and Left in chair  Time In: 740  Time Out: 807  Timed Coded Minutes: 27  Total Treatment Time: 27    Exercises:  See Flowsheets    Plan  Cont Per Plan Of Care    Goals  Short Term Goals  Time Frame for Short Term Goals: 3 days (24)  Short Term Goal 1: Patient will ascend/descend 3 stairs with right rail only and SBA  Short Term Goal 2: Patient will transfer supine to sit with minimal cues and Mod I      Long Term Goals  Time Frame for Long Term Goals : 7 days (24)  Long Term Goal 1: Patient will ambulate x50ft without assistive device and Mod I  Long Term Goal 2: Patient will transfer from flat bed with no cues and Mod I    Good Boss PTA      Date: 2024     
Phone: [phone] [facility]  Date: 2024  Fax: [phone]      Physical Therapy    Daily Note    Patient Name: Ruth Hutton      : 1940  (84 y.o.)  MRN: [id number]     Pt is PROGRESSING toward goals and increased independence of mobility this treatment session        Assessment  Bed mobility  Supine to Sit: Supervision        Supine to Sit: Modified independent  Sit to Supine: Independent       Sit to Stand: Modified independent (required verbal cues x 1 for proper hand placement)  Stand to Sit: Modified independent  Bed to Chair: Modified independent             WB Status: WBAT  Ambulation  Surface: Level tile  Device: Rolling Walker  Assistance: Modified Independent  Quality of Gait: Mildly shortened step length  Distance: 75 ft x2 (patient room to therapy room back to patient room)        Stairs  # Steps : 10  Stairs Height: 4\" (and 6\")  Rails: Right ascending  Assistance: Modified independent   Comment: Step through pattern    Assessment: Patient sitting on edge of bed. Patient transfered sit to stand independently. She ambulated 50 ft x2 with w.walker independent to and from therapy room. She ambulated up and down 5 stair steps with one handrail independently. She completed seated and standing ex per Doc Flow with increase reps on some ex. After short rest break, patient ambulated back to room and sat up in chair to eat lunch with call bell.  Safety Devices  Type of Devices: Call light within reach, Gait belt       Call light in reach  Time In: 11:36  Time Out: 12:04  Timed Coded Minutes: 28  Total Treatment Time: 28    Exercises:  See Flowsheets    Plan  Cont Per Plan Of Care    Goals  Short Term Goals  Time Frame for Short Term Goals: 3 days (24)  Short Term Goal 1: Patient will ascend/descend 3 stairs with right rail only and SBA-MET  Short Term Goal 2: Patient will transfer supine to sit with minimal cues and Mod I-MET       Long Term Goals  Time Frame for Long Term Goals : 7 
Phone: [phone] [facility]  Date: 2024  Fax: [phone]      Physical Therapy    Daily Note    Patient Name: Ruth Hutton      : 1940  (84 y.o.)  MRN: [id number]     Pt is PROGRESSING toward goals and increased independence of mobility this treatment session        Assessment  Bed mobility  Supine to Sit: modified independent (used bedrail)       Sit to Stand: Modified independent (required verbal cues x 1 for proper hand placement)  Stand to Sit: Modified independent  Bed to Chair: Modified independent         WB Status: WBAT  Ambulation  Surface: Level tile  Device: Rolling Walker  Assistance: Modified Independent  Quality of Gait: Mildly shortened step length  Distance: 75 ft x2 (patient room to therapy room back to patient room)        Stairs  # Steps : 10  Stairs Height: 4\" (and 6\")  Rails: Right ascending  Assistance: Modified independent   Comment: Step through pattern    Assessment: Patient in bed on arrival. Patient c/o not feeling well, sore/ upset belly. But patient agreed to try PT. She transfered supine to sit independently with bed flat with use of bedrail. SHe transfered sit to stand independnetly. She ambulated with w.walker to and from therapy room 75 ft x2 independently. She completed seated and standing ex per Doc flow. Held stairs due to patient request. On return to room patinet transfered back to bed independently.  Safety Devices  Type of Devices: Call light within reach, Gait belt, Left in bed, Nurse notified         Call light in reach  Time In: 10:00  Time Out: 10:27  Timed Coded Minutes: 27  Total Treatment Time: 27      Exercises:  See Flowsheets    Plan  Cont Per Plan Of Care    Goals  Short Term Goals  Time Frame for Short Term Goals: 3 days (24)  Short Term Goal 1: Patient will ascend/descend 3 stairs with right rail only and SBA-MET  Short Term Goal 2: Patient will transfer supine to sit with minimal cues and Mod I-MET             Long Term 
Phone: [phone] [facility]  Date: 2024  Fax: [phone]      Physical Therapy    Daily Note    Patient Name: Ruth Hutton      : 1940  (84 y.o.)  MRN: [id number]     Pt is PROGRESSING toward goals and increased independence of mobility this treatment session        Assessment    Sit to Stand: Modified independent (required verbal cues x 1 for proper hand placement)  Stand to Sit: Modified independent  Bed to Chair: Modified independent      WB Status: WBAT  Ambulation  Surface: Level tile  Device: Rolling Walker  Assistance: Modified Independent  Quality of Gait: Mildly shortened step length  Distance: 75 ft x2 (patient room to therapy room back to patient room)    Assessment: Pt sitting up in chair. Agreeable to PT session. Transfers STS mod(I). Gt with FWW x75ft (I). Seated and standing ex's per flowsheet. Pt declines performing stairs this morning and states \"maybe this afternoon, but right now I'm too tired\". Gt back to room with FWW (I). Pt left seated in recliner with call light in reach.  Safety Devices  Type of Devices: Call light within reach, Gait belt, Left in bed, Nurse notified    Call light in reach, Phone in reach, Use of Gait belt, and Left in chair  Time In: 903  Time Out: 933  Timed Coded Minutes: 30  Total Treatment Time: 30    Exercises:  See Flowsheets    Plan  Cont Per Plan Of Care    Goals  Short Term Goals  Time Frame for Short Term Goals: 3 days (24)  Short Term Goal 1: Patient will ascend/descend 3 stairs with right rail only and SBA-MET  Short Term Goal 2: Patient will transfer supine to sit with minimal cues and Mod I-MET    Long Term Goals  Time Frame for Long Term Goals : 7 days (24)  Long Term Goal 1: Patient will ambulate x50ft without assistive device and Mod I  Long Term Goal 2: Patient will transfer from flat bed with no cues and Mod I- Met    Good Boss PTA Therapy License Number: [id number]    Date: 2024     
Phone: [phone] [facility]  Date: 2024  Fax: [phone]      Physical Therapy    Daily Note    Patient Name: Ruth Hutton      : 1940  (84 y.o.)  MRN: [id number]     Pt is PROGRESSING toward goals and increased independence of mobility this treatment session        Assessment     Sit to Stand: Modified independent  Stand to Sit: Modified independent  Bed to Chair: Modified independent       WB Status: WBAT  Ambulation  Surface: Level tile  Device: Rolling Walker  Assistance: Modified Independent  Quality of Gait: Mildly shortened step length  Distance: 75 ft x 2 (patient room to therapy room back to patient room)        Stairs  # Steps : 10  Stairs Height: 4\" (and 6\")  Rails: Right ascending  Assistance: Modified independent   Comment: Step through pattern    Assessment: Patient seated in chair in arrival. Patient transfer sit to stand to w.walker independent. She ambulated with w.walker 50 ft x2 to and from therapy room modified independent. She ambulated up and down 5 stair steps with one handrail independently. Completed therex per Doc Flow with fair tolerance. Main c/o fatigue. Patient returned to room to sit up in bedsoide chair with call bell.  Safety Devices  Type of Devices: Call light within reach, Gait belt, Left in chair, Nurse notified          Call light in reach  Time In: 9:00  Time Out: 9:30  Timed Coded Minutes: 30  Total Treatment Time: 30    Exercises:  See Flowsheets    Plan  Cont Per Plan Of Care    Goals  Short Term Goals  Time Frame for Short Term Goals: 3 days (24)  Short Term Goal 1: Patient will ascend/descend 3 stairs with right rail only and SBA-MET  Short Term Goal 2: Patient will transfer supine to sit with minimal cues and Mod I-MET       Long Term Goals  Time Frame for Long Term Goals : 7 days (24)  Long Term Goal 1: Patient will ambulate x50ft without assistive device and Mod I  Long Term Goal 2: Patient will transfer from flat bed with no cues 
Pt returns from PT, all supplies collected for am shower. OT with patient, pt does very well stepping into shower stall and onto chair provided.   
Received order from Dr. Keane Back ok for bactrim ds 1 tablet every 12 hours for UTI x3 days. Ok for patient to take home medication.   Patient and son josie updated and verbalized understanding.   
SWINGBED PATIENT ACTIVITY PROGRAM ASSESSMENT    Patient Name: Ruth Hutton  : 1940   Gender: female   Diagnosis:  Weakness [R53.1] MRN: [id number]     Ability to read or write? [x] Yes   [] No  Ability to hear?   [x] Yes   [] No  Is patient confused?  [] Yes   [x] No    Enter Codes in Boxes Coding:  Very Important  Somewhat important  Not very important  Not important at all  Important but can’t do or no choice  No response or non-responsive   1 A. How important is it to you to have books, newspapers, and magazines to read?   2 B.  How important is it to you to listen to music you like?   2 C.  How important is it to you to be around animals such as pets?   1 D.  How important is it to you to keep up with the news?   1 E.  How important is it to you to do things with groups of people?   1 F.  How important is it to you to do your favorite activities?   1 G.  How important is it to you to go outside to get fresh air when the weather is good?   1 H.  How important is it to you to participate in [employer] services or practices?     Activity Care Plan:  Will participate in activity programs of choice daily with no decline throughout [facility] stay.          EVALUATOR’S SIGNATURE:  Yadi Knowles  Date: 2024  
Spiritual Services Interventions  0270/0270-01   11/26/2024  Sr Paloma Hutton  84 y.o. year old female    Encounter Summary  Encounter Overview/Reason: Follow-up  Service Provided For: Patient  Referral/Consult From: [place]  Last Encounter : 11/26/24  Complexity of Encounter: Low  Begin Time: 0805  End Time : 0810  Total Time Calculated: 5 min     Spiritual/Emotional needs  Type: Spiritual Support                    Assessment/Intervention/Outcome  Assessment: Calm, Coping  Intervention: Prayer (assurance of)/[place]  Outcome: Expressed Gratitude     
Spiritual Services Interventions  0270/0270-01   12/2/2024  Sr Paloma Hutton  84 y.o. year old female    Encounter Summary  Encounter Overview/Reason: Follow-up  Service Provided For: Patient  [facility]  Support System: Family members  Last Encounter : 12/02/24  Complexity of Encounter: Low  Begin Time: 0800  End Time : 0815  Total Time Calculated: 15 min     Spiritual/Emotional needs  Type: Spiritual Support                    Assessment/Intervention/Outcome  Assessment: Calm  Intervention: Prayer (assurance of)/[place]  Outcome: Expressed Gratitude     
eSWING BED ORIENTATION    Patient Name: Ruth Hutton  : 1940   Gender: female   Diagnosis:  Weakness [R53.1] MRN: [id number]     [x] Goal is to provide you with very good care    [x] Insurance and Financial Responsibilities    [x] Changes to Expect           - Safely encourage you to learn to care for yourself     - Frequency of Doctor's Visits       - Family Training Likely  [x] Visiting Hours:           11:00am - 8:30 pm (or by special arrangement)  [x] Personal Phone Number          Located on Dry-Erase Board   [x] Swing Bed Team Meetings         Family encouraged to attend   [x]  Clothing            Bring what you normally wear  [x]  Prevention of Falls           Put call light on, and wait until OK'd to get up on your own.  [x] Therapy Expectations          Do your best to participate  [x]  Advanced Directives                          []  Pt has advanced directives and we have a copy on file                            [x]  Pt has advanced directives and we do not have a copy on file,  notified and will follow up.                             []   Pt does not have advanced directives.  [x] Educated on mail policy           Address provided for direct to hospital service                                                                                                                                                                                                                                                                                       Orientation Completed and agreed upon with Ruth Hutton and/or Family Members                           
Categories: Overweight (BMI 25.0-29.9)    Estimated Daily Nutrient Needs:  Energy Requirements Based On: Kcal/kg  Weight Used for Energy Requirements: Current  Energy (kcal/day): 5407-0594 (20-25)  Weight Used for Protein Requirements: Ideal  Protein (g/day): 65-75 (1.3-1.5)  Method Used for Fluid Requirements: 1 ml/kcal  Fluid (ml/day): 1600+    Nutrition Diagnosis:   Increased nutrient needs related to acute injury/trauma as evidenced by wounds    Lab Results   Component Value Date     (L) 10/24/2024    K 3.7 10/24/2024    CL 98 10/24/2024    CO2 26 10/24/2024    BUN 21 10/24/2024    CREATININE 0.6 10/24/2024    GLUCOSE 128 (H) 10/24/2024    CALCIUM 9.1 10/24/2024    BILITOT <0.2 10/18/2024    ALKPHOS 69 10/18/2024    AST 16 10/18/2024    ALT 11 10/18/2024    LABGLOM 88 10/24/2024    GFRAA >60 02/27/2020     No results found for: \"LABA1C\"  Lab Results   Component Value Date    VITD25 29.8 (L) 02/27/2020     Nutrition Interventions:   Food and/or Nutrient Delivery: Continue Current Diet, Start Oral Nutrition Supplement  Nutrition Education/Counseling: Education/Counseling initiated  Coordination of Nutrition Care: Continue to monitor while inpatient  Plan of Care discussed with: patient    Goals:  Goals: Meet at least 75% of estimated needs  Type of Goal: New goal  Previous Goal Met: New Goal    Nutrition Monitoring and Evaluation:   Behavioral-Environmental Outcomes: None Identified  Food/Nutrient Intake Outcomes: Food and Nutrient Intake, Supplement Intake  Physical Signs/Symptoms Outcomes: Biochemical Data, Weight    Discharge Planning:    Continue Oral Nutrition Supplement     Darek Coburn RD, LD  Contact: [phone]        
Goal 2: Patient will transfer supine to sit with minimal cues and Mod I-MET             Long Term Goals  Time Frame for Long Term Goals : 7 days (11/29/24)  Long Term Goal 1: Patient will ambulate x50ft without assistive device and Mod I  Long Term Goal 2: Patient will transfer from flat bed with no cues and Mod I- Met             Smita Urbina, PT Therapy License Number: [id number]    Date: 11/29/2024   
insurance so far. Pt plans to return home at discharge from swing bed. Pt will decide on a home health agency to provide care, freedom of choice list provided, and SW has educated that she may not be running around while she has home health. Pt plans to discharge to home on Monday 12/2/2024 with  services. SW following. SHARDA Jim, NADER 11/25/2024     Pt has decided on [facility] for services after discharge. Referral made and they will start services next week likely Thursday. SAHRDA Jim, NADER 11/25/2024   
the patient's current medications (including all prescriptions, over-the-counter products, herbals, cannabis / cannabidiol products, vitamin / mineral / dietary (nutritional) supplements).  (Satisfies MIPS Performance)  If Yes, Stop Here  ( )  The patient is not eligible for medication reconciliation; the patient is in an emergent medical situation where delaying treatment would jeopardize the patient's health.  (MIPS Performance exception / exclusion)  ( )  I did not confirm, update or review the patient's current list of medications today.  (Does not satisfy MIPS Performance)      Advanced Care Plan    (x)  I confirmed that the patient's Advanced Care Plan is present, code status documented, or surrogate decision maker is listed in the patient's medical record.  ( )  The patient's advanced care plan is not present because:  (select)   ( ) I confirmed today that the patient does not wish or was not able to name a surrogate decision maker or provide an Advance Care Plan.   ( ) Hospice care is currently being provided or has been provided this calender year.  ( )  I did not confirm today the presence of an Advance Care Plan or surrogate decision maker documented within the patient's medical record. (Does not satisfy MIPS performance).            Lakhwinder Prince MD, MD  [facility]ist

## 2024-12-02 NOTE — TELEPHONE ENCOUNTER
Care Transitions Initial Follow Up Call    Outreach made within 2 business days of discharge: Yes    Patient: Ruth Hutton Patient : 1940   MRN: [id number]  Reason for Admission: Generalized  weakness  Discharge Date: 24           Discharge department/facility: [facility] Interactive Patient Contact:      Scheduled appointment with PCP within 7-14 days    Follow Up  Future Appointments   Date Time Provider Department Center   12/10/2024  2:00 PM Lary Liriano MD [facility] [place]   2024 11:45 AM Maricarmen Alegria MD [facility]       Bettye Yang, LPN

## 2024-12-02 NOTE — DISCHARGE SUMMARY
Hospitalist Discharge Summary    Patient:  Ruth Hutton  YOB: 1940    MRN: [id number]   Acct: [id number]    Primary Care Physician: Lary Liriano MD    Admit date:  11/22/2024    Discharge date:  12/2/2024       Discharge Diagnoses:   1.  Generalized weakness  2.S/P radical pelvic tumor resection, oophorectomy, omentectomy, low anterior resection, and loop colostomy creation on 11/13/24. On Eliquis 2.5 mg due to cancer   3.  Right-sided hydronephrosis  4.  Postop anemia, s/p 2 units PRBC transfusion on 11/14/2024  5.  Hypertension  6.  Insomnia  7.  Postop wound cellulitis, completed Augmentin course  8.  GERD  9.  Restless leg syndrome    Discharge Medications:         Medication List        START taking these medications      docusate 100 MG Caps  Commonly known as: COLACE, DULCOLAX  Take 100 mg by mouth daily     hyoscyamine 0.375 MG extended release tablet  Commonly known as: LEVBID  Take 1 tablet by mouth every 12 hours as needed for Cramping     ondansetron 8 MG Tbdp disintegrating tablet  Commonly known as: ZOFRAN-ODT  Take 1 tablet by mouth every 8 hours as needed for Nausea or Vomiting  Replaces: ondansetron 8 MG tablet     traZODone 50 MG tablet  Commonly known as: DESYREL  Take 1 tablet by mouth nightly as needed for Sleep            CHANGE how you take these medications      oxyCODONE 5 MG immediate release tablet  Commonly known as: ROXICODONE  Take 1 tablet by mouth every 6 hours as needed for Pain for up to 5 days. Max Daily Amount: 20 mg  What changed:   when to take this  reasons to take this            CONTINUE taking these medications      apixaban 2.5 MG Tabs tablet  Commonly known as: ELIQUIS  Take 1 tablet by mouth 2 times daily     hydroCHLOROthiazide 25 MG tablet  Commonly known as: HYDRODIURIL  Take 1 tablet by mouth daily     omeprazole 20 MG tablet  Commonly known as: PriLOSEC OTC  Take 1 tablet by mouth daily     pramipexole 1.5 MG tablet  Commonly known as:

## 2024-12-03 ENCOUNTER — CARE COORDINATION (OUTPATIENT)
Dept: CARE COORDINATION | Age: 84
End: 2024-12-03

## 2024-12-03 DIAGNOSIS — R53.1 WEAKNESS: Primary | ICD-10-CM

## 2024-12-03 PROCEDURE — 1111F DSCHRG MED/CURRENT MED MERGE: CPT | Performed by: FAMILY MEDICINE

## 2024-12-03 NOTE — TELEPHONE ENCOUNTER
Care Transitions Initial Follow Up Call    2nd attempt to call patient .   Outreach made within 2 business days of discharge: Yes    Patient: Ruth Hutton Patient : 1940   MRN: [id number]  Reason for Admission: Generalized weakness  Discharge Date: 24           Scheduled appointment with PCP within 7-14 days    Follow Up  Future Appointments   Date Time Provider Department Center   12/10/2024  2:00 PM Lary Liriano MD [facility]   2024 11:45 AM Maricarmen Alegria MD [facility]       Bettye Yang LPN

## 2024-12-03 NOTE — CARE COORDINATION
Care Transitions Note    Initial Call - Call within 2 business days of discharge: Yes    Patient Current Location:  Home: [address] [place] [id number]    Care Transition Nurse contacted the patient by telephone to perform post hospital discharge assessment, verified name and  as identifiers. Provided introduction to self, and explanation of the Care Transition Nurse role.     Patient: Ruth Hutton    Patient : 1940   MRN: [id number]    Reason for Admission: Weakness, Ovarian cancer, bilateral oophorectomy with resection of bladder peritoneum and rectosigmoid 24.   Discharge Date: 24  RURS: Readmission Risk Score: 9.6      Last Discharge Facility       Date Complaint Diagnosis Description Type Department Provider    24  Abdominal carcinomatosis (HCC) ... Admission (Discharged) [facility] Kendell Alexis MD            Was this an external facility discharge? No    Additional needs identified to be addressed with provider   No needs identified             Method of communication with provider: none.    Patients top risk factors for readmission: medical condition-Ovarian CA    Interventions to address risk factors:   Education: Medications as ordered, monitor for s/s of infection, monitor stoma for color changes, monitor output. Follow up with oncology and PCP.     Care Summary Note: Spoke with patient for initial 24 hour follow up call. Patient was at [facility] for therapy after she had surgery for ovarian CA.  Patient had bilateral oophorectomy, has new colostomy.  She was seen by surgeon yesterday after her discharge and states she had sutures removed.  Patient had surgery at [facility] and discharged to swing bed for additional therapy before returning home. She states she slept well last night, was glad to be home.  Today, she states she does not have a lot of pain, did take a pain pill this morning.  There was one pill she said she had to take but was too

## 2024-12-04 ENCOUNTER — TELEPHONE (OUTPATIENT)
Dept: FAMILY MEDICINE CLINIC | Age: 84
End: 2024-12-04

## 2024-12-04 NOTE — TELEPHONE ENCOUNTER
Sharlene from [facility] called stating she pick pt up for Colostomy care-FYI

## 2024-12-06 ENCOUNTER — CARE COORDINATION (OUTPATIENT)
Dept: CARE COORDINATION | Age: 84
End: 2024-12-06

## 2024-12-06 NOTE — CARE COORDINATION
Writer contacted patient for a follow up care transitions call-she states PT just arrived and requests a call back at a later time.

## 2024-12-10 ENCOUNTER — OFFICE VISIT (OUTPATIENT)
Dept: FAMILY MEDICINE CLINIC | Age: 84
End: 2024-12-10

## 2024-12-10 VITALS
DIASTOLIC BLOOD PRESSURE: 60 MMHG | OXYGEN SATURATION: 92 % | WEIGHT: 142 LBS | HEART RATE: 68 BPM | HEIGHT: 62 IN | BODY MASS INDEX: 26.13 KG/M2 | SYSTOLIC BLOOD PRESSURE: 128 MMHG

## 2024-12-10 DIAGNOSIS — Z09 S/P ABDOMINAL SURGERY, FOLLOW-UP EXAM: ICD-10-CM

## 2024-12-10 DIAGNOSIS — R53.1 GENERALIZED WEAKNESS: Primary | ICD-10-CM

## 2024-12-10 ASSESSMENT — ENCOUNTER SYMPTOMS
VOMITING: 0
BLOOD IN STOOL: 0
EYE REDNESS: 0
ABDOMINAL PAIN: 0
DIARRHEA: 0
COUGH: 0
SHORTNESS OF BREATH: 0
NAUSEA: 0
EYE DISCHARGE: 0

## 2024-12-10 NOTE — PROGRESS NOTES
Transitional Care Visit    Ruth Hutton   YOB: 1940    Date of Visit:  12/10/2024  30 Day Post-Discharge Date: 01/02/25    Allergies   Allergen Reactions    Macrobid [Nitrofurantoin Monohydrate Macrocrystals] Itching, Rash and Other (See Comments)     headache    Nitrofurantoin Itching, Other (See Comments) and Rash     Outpatient Medications Marked as Taking for the 12/10/24 encounter (Office Visit) with Lary Liriano MD   Medication Sig Dispense Refill    apixaban (ELIQUIS) 2.5 MG TABS tablet Take 1 tablet by mouth 2 times daily 60 tablet 0    ondansetron (ZOFRAN-ODT) 8 MG TBDP disintegrating tablet Take 1 tablet by mouth every 8 hours as needed for Nausea or Vomiting 20 tablet 0    docusate sodium (COLACE, DULCOLAX) 100 MG CAPS Take 100 mg by mouth daily 30 capsule 1    hyoscyamine (LEVBID) 0.375 MG extended release tablet Take 1 tablet by mouth every 12 hours as needed for Cramping 60 tablet 3    traZODone (DESYREL) 50 MG tablet Take 1 tablet by mouth nightly as needed for Sleep 30 tablet 5    hydroCHLOROthiazide (HYDRODIURIL) 25 MG tablet Take 1 tablet by mouth daily 30 tablet 5    omeprazole (PRILOSEC OTC) 20 MG tablet Take 1 tablet by mouth daily 30 tablet 3    pramipexole (MIRAPEX) 1.5 MG tablet Take 1 tablet by mouth nightly 30 tablet 11         Vitals:    12/10/24 1409   BP: 128/60   Pulse: 68   SpO2: 92%   Weight: 64.4 kg (142 lb)   Height: 1.575 m (5' 2\")     Body mass index is 25.97 kg/m².     Wt Readings from Last 3 Encounters:   12/10/24 64.4 kg (142 lb)   12/01/24 65.5 kg (144 lb 6.4 oz)   10/29/24 68 kg (150 lb)     BP Readings from Last 3 Encounters:   12/10/24 128/60   12/02/24 (!) 129/53   10/29/24 128/62        Patient was admitted to [facility] from 11/22/24 to 12/2/24 for generalized weakness and s/p radical pelvic tumor resection with oophorectomy, omentectomy, low anterior resection, loop colostomy on 11/13/24 per Dr Sharpe at [facility].  Post

## 2024-12-11 ENCOUNTER — OFFICE VISIT (OUTPATIENT)
Dept: ONCOLOGY | Age: 84
End: 2024-12-11
Payer: MEDICARE

## 2024-12-11 ENCOUNTER — TELEPHONE (OUTPATIENT)
Dept: ONCOLOGY | Age: 84
End: 2024-12-11

## 2024-12-11 VITALS
BODY MASS INDEX: 26.5 KG/M2 | DIASTOLIC BLOOD PRESSURE: 75 MMHG | RESPIRATION RATE: 18 BRPM | HEIGHT: 62 IN | WEIGHT: 144 LBS | HEART RATE: 84 BPM | TEMPERATURE: 97.3 F | SYSTOLIC BLOOD PRESSURE: 142 MMHG

## 2024-12-11 DIAGNOSIS — C56.3 MALIGNANT NEOPLASM OF BOTH OVARIES (HCC): Primary | ICD-10-CM

## 2024-12-11 DIAGNOSIS — C76.2 ABDOMINAL CARCINOMATOSIS (HCC): ICD-10-CM

## 2024-12-11 PROCEDURE — G8417 CALC BMI ABV UP PARAM F/U: HCPCS | Performed by: INTERNAL MEDICINE

## 2024-12-11 PROCEDURE — 1111F DSCHRG MED/CURRENT MED MERGE: CPT | Performed by: INTERNAL MEDICINE

## 2024-12-11 PROCEDURE — 99211 OFF/OP EST MAY X REQ PHY/QHP: CPT | Performed by: INTERNAL MEDICINE

## 2024-12-11 PROCEDURE — 1036F TOBACCO NON-USER: CPT | Performed by: INTERNAL MEDICINE

## 2024-12-11 PROCEDURE — 1123F ACP DISCUSS/DSCN MKR DOCD: CPT | Performed by: INTERNAL MEDICINE

## 2024-12-11 PROCEDURE — G8427 DOCREV CUR MEDS BY ELIG CLIN: HCPCS | Performed by: INTERNAL MEDICINE

## 2024-12-11 PROCEDURE — 1126F AMNT PAIN NOTED NONE PRSNT: CPT | Performed by: INTERNAL MEDICINE

## 2024-12-11 PROCEDURE — 99214 OFFICE O/P EST MOD 30 MIN: CPT | Performed by: INTERNAL MEDICINE

## 2024-12-11 PROCEDURE — G8400 PT W/DXA NO RESULTS DOC: HCPCS | Performed by: INTERNAL MEDICINE

## 2024-12-11 PROCEDURE — 1090F PRES/ABSN URINE INCON ASSESS: CPT | Performed by: INTERNAL MEDICINE

## 2024-12-11 PROCEDURE — G8484 FLU IMMUNIZE NO ADMIN: HCPCS | Performed by: INTERNAL MEDICINE

## 2024-12-11 NOTE — PATIENT INSTRUCTIONS
Resume chemo on December 26 or 27  Labs including tumor marker  before every treatment  RV 3 weeks after that.

## 2024-12-11 NOTE — PROGRESS NOTES
DIAGNOSIS:   Serous ovarian cancer  Abdominal carcinomatosis  CURRENT THERAPY:  Starting neoadjuvant chemotherapy, started with single agent carboplatin in August/24  Chemotherapy changed to Taxol carboplatin starting September/24  S/p debulking surgery 11/26/24 after 4 cycles of neoadjuvant chemotherapy.   Plan to complete 6 cycles of  chemotherapy  BRIEF CASE HISTORY:   Ms. Ruth Hutton is a very pleasant 84 y.o. female with history of multiple co morbidities as listed.  Patient is referred for evaluation and further management of pelvic mass.  Concerning for malignancy.  The patient had an episode of pelvic pain about 3 weeks back.  She had urinary urgency.  No hematuria.  She was evaluated with ultrasound of urinary bladder and CT scan of the pelvis.  She was found to have large both cystic and solid lobulated masses in the pelvis greater on the left than the right suspicious for ovarian carcinoma versus metastatic versus primary peritoneal carcinoma.  The patient had MRI done but no results yet.  Clinically she is doing fine.  No pain at the present time.  No GYN symptoms.  No active bleeding.  No spotting.  No urinary symptoms.  No GI symptoms.  No weight loss or decreased appetite.  No fever or night sweats.  Patient had history of hysterectomy and unilateral oophorectomy about 4 years ago.  Patient denies smoking or alcohol drinking.  The patient was seen and sent for GYN oncology consultation.  Decision was to proceed with neoadjuvant chemotherapy.  To be followed by surgical intervention after 3-4 cycles  The patient had significant trepidation about chemotherapy so we decided to give her single agent carboplatin with cycle 1 with a plan to transition to Taxol carboplatin with cycle 2.  The patient received 4 cycles of chemotherapy with plans to debulk after chemotherapy  INTERIM HISTORY  Patient comes in today for a

## 2024-12-11 NOTE — TELEPHONE ENCOUNTER
Name: Ruth Hutton  : 1940  MRN: [id number]    Oncology Navigation Follow-Up Note    Contact Type:  Medical Oncology    Notes: Met with Ruth in clinic.  Ruth states she is doing well since surgery.  Notes she has been cooking and doing some laundry with some help.  Ruth notes she listens to her body and rests when she needs to.   Ruth states she is ready to start treatment.  Plan to start 24. Support given.  Ruth encouraged to call with any needs.       Electronically signed by Keerthi Kurtz RN on 2024 at 1:28 PM

## 2024-12-12 ENCOUNTER — CARE COORDINATION (OUTPATIENT)
Dept: CARE COORDINATION | Age: 84
End: 2024-12-12

## 2024-12-12 NOTE — CARE COORDINATION
Care Transitions Note    Follow Up Call     Patient Current Location:  Home: [address] [place] [address]    [facility] Care Coordinator contacted the patient by telephone. Verified name and  as identifiers.    Additional needs identified to be addressed with provider   No needs identified                 Method of communication with provider: none.    Care Summary Note: Writer spoke to Ruth for follow up transitional care call. She stated she had to change ostomy bag twice she figured it out and has home no other issue changing ostomy.Stool is soft, drinking prune juice she has no other concerns with ostomy . Skin looks good around ostomy incision healing she is keeping clean. Voiding fine. Denies N/V/d fever or chills. [facility] nurse will be out tomorrow. She had chicken and noodles today. PT signed off today she stated she is walking on her own to help with strength but is getting better.     She had oncology follow up will restart chemo on  she is aware to obtain lab work before each visit. Hospital follow up completed no medication changes notes reviewed.    Plan of care updates since last contact:  Review of patient management of conditions/medications: weakness discussed oncology and hospital follow up appointment.will restart chemo        Advance Care Planning:   Does patient have an Advance Directive: reviewed during previous call, see note. .    Medication Review:  No changes since last call.     Remote Patient Monitoring:  Offered patient enrollment in the Remote Patient Monitoring (RPM) program for in-home monitoring: Patient is not eligible for RPM program because: patient does not have qualifying diagnosis.    Assessments:  Care Transitions Subsequent and Final Call    Subsequent and Final Calls  Care Transitions Interventions  Other Interventions:              Follow Up Appointment:   Reviewed upcoming appointment(s).  Future Appointments         Provider Specialty Dept Phone

## 2024-12-18 DIAGNOSIS — C76.2 ABDOMINAL CARCINOMATOSIS (HCC): Primary | ICD-10-CM

## 2024-12-18 DIAGNOSIS — C56.3 MALIGNANT NEOPLASM OF BOTH OVARIES (HCC): ICD-10-CM

## 2024-12-18 RX ORDER — PROCHLORPERAZINE EDISYLATE 5 MG/ML
5 INJECTION INTRAMUSCULAR; INTRAVENOUS
OUTPATIENT
Start: 2024-12-18

## 2024-12-18 RX ORDER — ACETAMINOPHEN 325 MG/1
650 TABLET ORAL
OUTPATIENT
Start: 2024-12-18

## 2024-12-18 RX ORDER — FAMOTIDINE 10 MG/ML
20 INJECTION, SOLUTION INTRAVENOUS
OUTPATIENT
Start: 2024-12-18

## 2024-12-18 RX ORDER — ALBUTEROL SULFATE 90 UG/1
4 INHALANT RESPIRATORY (INHALATION) PRN
OUTPATIENT
Start: 2024-12-18

## 2024-12-18 RX ORDER — SODIUM CHLORIDE 9 MG/ML
5-250 INJECTION, SOLUTION INTRAVENOUS PRN
OUTPATIENT
Start: 2024-12-18

## 2024-12-18 RX ORDER — SODIUM CHLORIDE 9 MG/ML
INJECTION, SOLUTION INTRAVENOUS CONTINUOUS
OUTPATIENT
Start: 2024-12-18

## 2024-12-18 RX ORDER — HEPARIN SODIUM (PORCINE) LOCK FLUSH IV SOLN 100 UNIT/ML 100 UNIT/ML
500 SOLUTION INTRAVENOUS PRN
OUTPATIENT
Start: 2024-12-18

## 2024-12-18 RX ORDER — DIPHENHYDRAMINE HYDROCHLORIDE 50 MG/ML
50 INJECTION INTRAMUSCULAR; INTRAVENOUS ONCE
OUTPATIENT
Start: 2024-12-18 | End: 2024-12-18

## 2024-12-18 RX ORDER — ONDANSETRON 2 MG/ML
8 INJECTION INTRAMUSCULAR; INTRAVENOUS
OUTPATIENT
Start: 2024-12-18

## 2024-12-18 RX ORDER — SODIUM CHLORIDE 0.9 % (FLUSH) 0.9 %
5-40 SYRINGE (ML) INJECTION PRN
OUTPATIENT
Start: 2024-12-18

## 2024-12-18 RX ORDER — PALONOSETRON 0.05 MG/ML
0.25 INJECTION, SOLUTION INTRAVENOUS ONCE
OUTPATIENT
Start: 2024-12-18 | End: 2024-12-18

## 2024-12-18 RX ORDER — EPINEPHRINE 1 MG/ML
0.3 INJECTION, SOLUTION, CONCENTRATE INTRAVENOUS PRN
OUTPATIENT
Start: 2024-12-18

## 2024-12-18 RX ORDER — FAMOTIDINE 10 MG/ML
20 INJECTION, SOLUTION INTRAVENOUS ONCE
OUTPATIENT
Start: 2024-12-18 | End: 2024-12-18

## 2024-12-18 RX ORDER — HYDROCORTISONE SODIUM SUCCINATE 100 MG/2ML
100 INJECTION INTRAMUSCULAR; INTRAVENOUS
OUTPATIENT
Start: 2024-12-18

## 2024-12-18 RX ORDER — DIPHENHYDRAMINE HYDROCHLORIDE 50 MG/ML
50 INJECTION INTRAMUSCULAR; INTRAVENOUS
OUTPATIENT
Start: 2024-12-18

## 2024-12-19 ENCOUNTER — CARE COORDINATION (OUTPATIENT)
Dept: CARE COORDINATION | Age: 84
End: 2024-12-19

## 2024-12-19 NOTE — CARE COORDINATION
Care Transitions Note    Follow Up Call     Patient Current Location:  Home: [address] [facility]  [place] [address]    [facility] Care Coordinator contacted the patient by telephone. Verified name and  as identifiers.    Additional needs identified to be addressed with provider   No needs identified                 Method of communication with provider: none.    Care Summary Note: Writer spoke with Ruth for a follow up care transitions call. She states she is doing okay overall. She reports she is having good output from her ostomy-she was initially having issues with hard stools but has worked with her [facility] nurse on ostomy care/education and is no longer having any issues. She denies having any urinary symptoms and will be seeing urology at [facility] in January. She states last night she was having some low back pain that did not radiate. She states she took Tylenol and used a heating pad and pain went away-she denies having a fever or chills. Advised to inform PCP if symptoms return. She verbalized her understanding and denies having any other needs or concerns currently.     Plan of care updates since last contact:  Review of patient management of conditions/medications:         Advance Care Planning:   Does patient have an Advance Directive: reviewed during previous call, see note. .    Medication Review:  No changes since last call.     Remote Patient Monitoring:  Offered patient enrollment in the Remote Patient Monitoring (RPM) program for in-home monitoring: Patient is not eligible for RPM program because: patient does not have qualifying diagnosis.    Assessments:  Care Transitions Subsequent and Final Call    Subsequent and Final Calls  Do you have any ongoing symptoms?: No  Have your medications changed?: No  Do you have any questions related to your medications?: No  Do you currently have any active services?: No  Are you currently active with any services?: Home Health  Do you have any needs or concerns that

## 2024-12-27 ENCOUNTER — HOSPITAL ENCOUNTER (OUTPATIENT)
Dept: INFUSION THERAPY | Age: 84
Discharge: HOME | End: 2024-12-27
Payer: MEDICARE

## 2024-12-27 VITALS
RESPIRATION RATE: 16 BRPM | HEIGHT: 62 IN | TEMPERATURE: 97.5 F | BODY MASS INDEX: 26.5 KG/M2 | HEART RATE: 95 BPM | DIASTOLIC BLOOD PRESSURE: 59 MMHG | SYSTOLIC BLOOD PRESSURE: 133 MMHG | WEIGHT: 144 LBS

## 2024-12-27 DIAGNOSIS — R19.00 PELVIC MASS IN FEMALE: ICD-10-CM

## 2024-12-27 DIAGNOSIS — C76.2 ABDOMINAL CARCINOMATOSIS (HCC): Primary | ICD-10-CM

## 2024-12-27 DIAGNOSIS — C56.3 MALIGNANT NEOPLASM OF BOTH OVARIES (HCC): ICD-10-CM

## 2024-12-27 LAB
ALBUMIN SERPL-MCNC: 3.9 G/DL (ref 3.5–5.2)
ALBUMIN/GLOB SERPL: 1.3 {RATIO} (ref 1–2.5)
ALP SERPL-CCNC: 88 U/L (ref 35–104)
ALT SERPL-CCNC: 10 U/L (ref 10–35)
ANION GAP SERPL CALCULATED.3IONS-SCNC: 13 MMOL/L (ref 9–16)
AST SERPL-CCNC: 16 U/L (ref 10–35)
BASOPHILS # BLD: 0 K/UL (ref 0–0.2)
BASOPHILS NFR BLD: 0 % (ref 0–2)
BILIRUB SERPL-MCNC: <0.2 MG/DL (ref 0–1.2)
BUN SERPL-MCNC: 11 MG/DL (ref 8–23)
BUN/CREAT SERPL: 14 (ref 9–20)
CALCIUM SERPL-MCNC: 9.4 MG/DL (ref 8.6–10.4)
CANCER AG125 SERPL-ACNC: 43 U/ML (ref 0–38)
CHLORIDE SERPL-SCNC: 102 MMOL/L (ref 98–107)
CO2 SERPL-SCNC: 24 MMOL/L (ref 20–31)
CREAT SERPL-MCNC: 0.8 MG/DL (ref 0.5–0.9)
EOSINOPHIL # BLD: 0 K/UL (ref 0–0.44)
EOSINOPHILS RELATIVE PERCENT: 0 % (ref 1–4)
ERYTHROCYTE [DISTWIDTH] IN BLOOD BY AUTOMATED COUNT: 13.2 % (ref 11.8–14.4)
GFR, ESTIMATED: 76 ML/MIN/1.73M2
GLUCOSE SERPL-MCNC: 216 MG/DL (ref 74–99)
HCT VFR BLD AUTO: 32.3 % (ref 36.3–47.1)
HGB BLD-MCNC: 10.6 G/DL (ref 11.9–15.1)
IMM GRANULOCYTES # BLD AUTO: 0.06 K/UL (ref 0–0.3)
IMM GRANULOCYTES NFR BLD: 1 %
LYMPHOCYTES NFR BLD: 0.38 K/UL (ref 1.1–3.7)
LYMPHOCYTES RELATIVE PERCENT: 6 % (ref 24–43)
MCH RBC QN AUTO: 29.1 PG (ref 25.2–33.5)
MCHC RBC AUTO-ENTMCNC: 32.8 G/DL (ref 28.4–34.8)
MCV RBC AUTO: 88.7 FL (ref 82.6–102.9)
MONOCYTES NFR BLD: 0.06 K/UL (ref 0.1–1.2)
MONOCYTES NFR BLD: 1 % (ref 3–12)
MORPHOLOGY: NORMAL
NEUTROPHILS NFR BLD: 92 % (ref 36–65)
NEUTS SEG NFR BLD: 5.8 K/UL (ref 1.5–8.1)
NRBC BLD-RTO: 0 PER 100 WBC
PLATELET # BLD AUTO: 345 K/UL (ref 138–453)
PMV BLD AUTO: 8.9 FL (ref 8.1–13.5)
POTASSIUM SERPL-SCNC: 3.6 MMOL/L (ref 3.7–5.3)
PROT SERPL-MCNC: 6.8 G/DL (ref 6.6–8.7)
RBC # BLD AUTO: 3.64 M/UL (ref 3.95–5.11)
SODIUM SERPL-SCNC: 139 MMOL/L (ref 136–145)
WBC OTHER # BLD: 6.3 K/UL (ref 3.5–11.3)

## 2024-12-27 PROCEDURE — 96415 CHEMO IV INFUSION ADDL HR: CPT

## 2024-12-27 PROCEDURE — 2580000003 HC RX 258: Performed by: INTERNAL MEDICINE

## 2024-12-27 PROCEDURE — 96417 CHEMO IV INFUS EACH ADDL SEQ: CPT

## 2024-12-27 PROCEDURE — 86304 IMMUNOASSAY TUMOR CA 125: CPT

## 2024-12-27 PROCEDURE — 96413 CHEMO IV INFUSION 1 HR: CPT

## 2024-12-27 PROCEDURE — 2500000003 HC RX 250 WO HCPCS: Performed by: INTERNAL MEDICINE

## 2024-12-27 PROCEDURE — 85025 COMPLETE CBC W/AUTO DIFF WBC: CPT

## 2024-12-27 PROCEDURE — 96375 TX/PRO/DX INJ NEW DRUG ADDON: CPT

## 2024-12-27 PROCEDURE — 96367 TX/PROPH/DG ADDL SEQ IV INF: CPT

## 2024-12-27 PROCEDURE — 80053 COMPREHEN METABOLIC PANEL: CPT

## 2024-12-27 PROCEDURE — 6360000002 HC RX W HCPCS: Performed by: INTERNAL MEDICINE

## 2024-12-27 RX ORDER — FAMOTIDINE 10 MG/ML
20 INJECTION, SOLUTION INTRAVENOUS ONCE
Status: COMPLETED | OUTPATIENT
Start: 2024-12-27 | End: 2024-12-27

## 2024-12-27 RX ORDER — SODIUM CHLORIDE 9 MG/ML
5-250 INJECTION, SOLUTION INTRAVENOUS PRN
Status: DISCONTINUED | OUTPATIENT
Start: 2024-12-27 | End: 2024-12-28 | Stop reason: HOSPADM

## 2024-12-27 RX ORDER — DIPHENHYDRAMINE HYDROCHLORIDE 50 MG/ML
50 INJECTION INTRAMUSCULAR; INTRAVENOUS ONCE
Status: COMPLETED | OUTPATIENT
Start: 2024-12-27 | End: 2024-12-27

## 2024-12-27 RX ORDER — DEXAMETHASONE SODIUM PHOSPHATE 10 MG/ML
10 INJECTION, SOLUTION INTRAMUSCULAR; INTRAVENOUS ONCE
Status: COMPLETED | OUTPATIENT
Start: 2024-12-27 | End: 2024-12-27

## 2024-12-27 RX ORDER — PALONOSETRON 0.05 MG/ML
0.25 INJECTION, SOLUTION INTRAVENOUS ONCE
Status: COMPLETED | OUTPATIENT
Start: 2024-12-27 | End: 2024-12-27

## 2024-12-27 RX ORDER — HEPARIN 100 UNIT/ML
500 SYRINGE INTRAVENOUS PRN
Status: DISCONTINUED | OUTPATIENT
Start: 2024-12-27 | End: 2024-12-28 | Stop reason: HOSPADM

## 2024-12-27 RX ORDER — SODIUM CHLORIDE 0.9 % (FLUSH) 0.9 %
5-40 SYRINGE (ML) INJECTION PRN
Status: DISCONTINUED | OUTPATIENT
Start: 2024-12-27 | End: 2024-12-28 | Stop reason: HOSPADM

## 2024-12-27 RX ADMIN — PACLITAXEL 294 MG: 6 INJECTION, SOLUTION INTRAVENOUS at 11:05

## 2024-12-27 RX ADMIN — SODIUM CHLORIDE, PRESERVATIVE FREE 10 ML: 5 INJECTION INTRAVENOUS at 14:45

## 2024-12-27 RX ADMIN — SODIUM CHLORIDE, PRESERVATIVE FREE 10 ML: 5 INJECTION INTRAVENOUS at 08:59

## 2024-12-27 RX ADMIN — CARBOPLATIN 355 MG: 10 INJECTION, SOLUTION INTRAVENOUS at 14:12

## 2024-12-27 RX ADMIN — DEXAMETHASONE SODIUM PHOSPHATE 10 MG: 10 INJECTION, SOLUTION INTRAMUSCULAR; INTRAVENOUS at 10:32

## 2024-12-27 RX ADMIN — SODIUM CHLORIDE, PRESERVATIVE FREE 10 ML: 5 INJECTION INTRAVENOUS at 10:23

## 2024-12-27 RX ADMIN — PALONOSETRON 0.25 MG: 0.05 INJECTION, SOLUTION INTRAVENOUS at 10:21

## 2024-12-27 RX ADMIN — SODIUM CHLORIDE 200 ML/HR: 9 INJECTION, SOLUTION INTRAVENOUS at 10:21

## 2024-12-27 RX ADMIN — SODIUM CHLORIDE 150 MG: 9 INJECTION, SOLUTION INTRAVENOUS at 10:39

## 2024-12-27 RX ADMIN — FAMOTIDINE 20 MG: 10 INJECTION, SOLUTION INTRAVENOUS at 10:23

## 2024-12-27 RX ADMIN — SODIUM CHLORIDE, PRESERVATIVE FREE 10 ML: 5 INJECTION INTRAVENOUS at 14:44

## 2024-12-27 RX ADMIN — HEPARIN 500 UNITS: 100 SYRINGE at 14:45

## 2024-12-27 RX ADMIN — SODIUM CHLORIDE, PRESERVATIVE FREE 10 ML: 5 INJECTION INTRAVENOUS at 09:00

## 2024-12-27 RX ADMIN — DIPHENHYDRAMINE HYDROCHLORIDE 50 MG: 50 INJECTION INTRAMUSCULAR; INTRAVENOUS at 10:35

## 2024-12-30 ENCOUNTER — OFFICE VISIT (OUTPATIENT)
Dept: FAMILY MEDICINE CLINIC | Age: 84
End: 2024-12-30

## 2024-12-30 ENCOUNTER — HOSPITAL ENCOUNTER (OUTPATIENT)
Age: 84
Setting detail: SPECIMEN
Discharge: HOME | End: 2024-12-30
Payer: MEDICARE

## 2024-12-30 VITALS
DIASTOLIC BLOOD PRESSURE: 60 MMHG | OXYGEN SATURATION: 97 % | SYSTOLIC BLOOD PRESSURE: 128 MMHG | HEART RATE: 98 BPM | BODY MASS INDEX: 25.97 KG/M2 | WEIGHT: 142 LBS

## 2024-12-30 DIAGNOSIS — N39.0 COMPLICATED UTI (URINARY TRACT INFECTION): ICD-10-CM

## 2024-12-30 DIAGNOSIS — R35.0 URINARY FREQUENCY: Primary | ICD-10-CM

## 2024-12-30 DIAGNOSIS — R82.81 PYURIA: ICD-10-CM

## 2024-12-30 DIAGNOSIS — R35.0 URINARY FREQUENCY: ICD-10-CM

## 2024-12-30 LAB
BILIRUBIN, POC: NEGATIVE
BLOOD URINE, POC: NORMAL
CLARITY, POC: CLEAR
COLOR, POC: YELLOW
GLUCOSE URINE, POC: NEGATIVE MG/DL
KETONES, POC: NEGATIVE MG/DL
LEUKOCYTE EST, POC: NORMAL
NITRITE, POC: NEGATIVE
PH, POC: 6
PROTEIN, POC: NEGATIVE MG/DL
SPECIFIC GRAVITY, POC: 1.02
UROBILINOGEN, POC: 0.2 MG/DL

## 2024-12-30 PROCEDURE — 87086 URINE CULTURE/COLONY COUNT: CPT

## 2024-12-30 RX ORDER — CIPROFLOXACIN 500 MG/1
500 TABLET, FILM COATED ORAL 2 TIMES DAILY
Qty: 10 TABLET | Refills: 0 | Status: SHIPPED | OUTPATIENT
Start: 2024-12-30 | End: 2025-01-04

## 2024-12-30 ASSESSMENT — ENCOUNTER SYMPTOMS
VOMITING: 0
DIARRHEA: 0
BACK PAIN: 0
WHEEZING: 0
ABDOMINAL PAIN: 1
NAUSEA: 0
SINUS PAIN: 0
COUGH: 0
RHINORRHEA: 0

## 2024-12-30 NOTE — PROGRESS NOTES
12/30/2025    POCT Urinalysis No Micro (Auto)         Patient Instructions   SURVEY:    You may be receiving a survey from [employer] regarding your visit today.    You may get this in the mail, through your MyChart or in your email.     Please complete the survey to enable us to provide the highest quality of care to you and your family.    If you cannot score us as very good ( 5 Stars) on any question, please feel free to call the office to discuss how we could have made your experience exceptional.     Thank you.    Clinical Care Team:  DO Trudi Gutierrez LPN    Triage:  Maru Dunaway CMA    Clerical Team:  Maru Schroeder      Electronically signed by Adalberto Frankel DO on 12/30/2024 at 9:19 AM           Completed Refills   Requested Prescriptions     Signed Prescriptions Disp Refills    ciprofloxacin (CIPRO) 500 MG tablet 10 tablet 0     Sig: Take 1 tablet by mouth 2 times daily for 5 days

## 2024-12-30 NOTE — PATIENT INSTRUCTIONS
SURVEY:    You may be receiving a [facility] from [facility] your [facility] today.    You may get this in the mail, through your MyChart or in your email.     Please complete the survey to enable us to provide the highest quality of care to you and your family.    If you cannot score us as very good ( 5 Stars) on any question, please feel free to call the office to discuss how we could have made your experience exceptional.     Thank you.    Clinical Care Team:  Dr. Adalberto Frankel, DO Trudi Brumfield LPN    Triage:  Maru Dunaway CMA    Clerical Team:  Maru Schroeder

## 2024-12-31 LAB
MICROORGANISM SPEC CULT: NORMAL
SERVICE CMNT-IMP: NORMAL
SPECIMEN DESCRIPTION: NORMAL

## 2025-01-03 ENCOUNTER — CARE COORDINATION (OUTPATIENT)
Dept: CARE COORDINATION | Age: 85
End: 2025-01-03

## 2025-01-03 NOTE — CARE COORDINATION
Therapy [phone]            Patient has agreed to contact primary care provider and/or specialist for any further questions, concerns, or needs.    Monica Castillo LPN

## 2025-01-14 ENCOUNTER — OFFICE VISIT (OUTPATIENT)
Dept: FAMILY MEDICINE CLINIC | Age: 85
End: 2025-01-14

## 2025-01-14 VITALS
OXYGEN SATURATION: 97 % | BODY MASS INDEX: 26.89 KG/M2 | WEIGHT: 147 LBS | DIASTOLIC BLOOD PRESSURE: 62 MMHG | SYSTOLIC BLOOD PRESSURE: 134 MMHG | HEART RATE: 80 BPM

## 2025-01-14 DIAGNOSIS — C56.3 MALIGNANT NEOPLASM OF BOTH OVARIES (HCC): ICD-10-CM

## 2025-01-14 DIAGNOSIS — I10 PRIMARY HYPERTENSION: Primary | ICD-10-CM

## 2025-01-14 PROBLEM — N13.30 HYDROURETERONEPHROSIS: Status: ACTIVE | Noted: 2025-01-10

## 2025-01-14 ASSESSMENT — ENCOUNTER SYMPTOMS
EYE DISCHARGE: 0
SHORTNESS OF BREATH: 0
BLOOD IN STOOL: 0
WHEEZING: 0
DIARRHEA: 0
BLURRED VISION: 0
EYE REDNESS: 0
FACIAL SWELLING: 0
VOMITING: 0
ABDOMINAL PAIN: 0

## 2025-01-14 ASSESSMENT — PATIENT HEALTH QUESTIONNAIRE - PHQ9
SUM OF ALL RESPONSES TO PHQ9 QUESTIONS 1 & 2: 0
SUM OF ALL RESPONSES TO PHQ QUESTIONS 1-9: 0
2. FEELING DOWN, DEPRESSED OR HOPELESS: NOT AT ALL
SUM OF ALL RESPONSES TO PHQ QUESTIONS 1-9: 0
1. LITTLE INTEREST OR PLEASURE IN DOING THINGS: NOT AT ALL
SUM OF ALL RESPONSES TO PHQ QUESTIONS 1-9: 0
SUM OF ALL RESPONSES TO PHQ QUESTIONS 1-9: 0

## 2025-01-14 NOTE — PROGRESS NOTES
05/17/2018 12:00 AM    HDL 64 02/27/2020 08:26 AM          Assessment/Plan:        1. Primary hypertension  Pt states she is feeling very well and exam is WNL. Pt has normal BP here today and checked with her BP cuff which ran about 25 points higher on systolic and 18 pts higher diastolic, checked twice with both between Dr and the nurse.   So for now will just monitor BP and pt to know her home BP cuff runs higher so record the higher reading  No added medication -- stay on the HCTZ and low salt diet. Pt is active    2. Malignant neoplasm of both ovaries (HCC)  Pt is in active tx        Ruth received counseling on the following healthy behaviors: nutrition and exercise  Reviewed prior labs and health maintenance  Continue current medications, diet and exercise.  Discussed use, benefit, and side effects of prescribed medications.  Barriers to medication compliance addressed.  Patient given educational materials - see patient instructions  Was a self-tracking handout given in paper form or via Email [employer]? Yes    Requested Prescriptions      No prescriptions requested or ordered in this encounter       All patient questions answered.  Patient voiced understanding.     Quality Measures    Body mass index is 26.89 kg/m². Normal. Weight control planned discussed Healthy diet and regular exercise.    BP: 134/62. Blood pressure is normal. Treatment plan consists of No treatment change needed.        7/9/2024    10:44 AM 7/27/2022     8:10 AM 12/16/2020     9:59 AM 10/25/2019     9:35 AM 11/1/2018    11:25 AM 6/4/2018    10:18 AM 5/15/2017     9:58 AM   Fall Risk   Do you feel unsteady or are you worried about falling?  no yes        2 or more falls in past year? no no no no no  no   Fall with injury in past year? no no no no no no no     The patient does not have a history of falls. I did not - not indicated , complete a risk assessment for falls. A plan of care for falls No Treatment plan indicated    No results found for:

## 2025-01-14 NOTE — PATIENT INSTRUCTIONS
SURVEY:    You may be receiving a survey from [facility] regarding your visit today.    Please complete the survey to enable us to provide the highest quality of care to you and your family.    If you cannot score us a very good (5 Stars) on any question, please call the office to discuss how we could have made your experience a very good one.    Thank you.    Clinical Care Team: MD Niko Morrison LPN              Triage: Maru Dunaway CMA              Clerical Team: Maru Schroeder

## 2025-01-15 ENCOUNTER — OFFICE VISIT (OUTPATIENT)
Dept: ONCOLOGY | Age: 85
End: 2025-01-15
Payer: MEDICARE

## 2025-01-15 ENCOUNTER — TELEPHONE (OUTPATIENT)
Dept: ONCOLOGY | Age: 85
End: 2025-01-15

## 2025-01-15 VITALS
SYSTOLIC BLOOD PRESSURE: 142 MMHG | TEMPERATURE: 98.2 F | RESPIRATION RATE: 18 BRPM | HEART RATE: 89 BPM | WEIGHT: 147 LBS | DIASTOLIC BLOOD PRESSURE: 77 MMHG | BODY MASS INDEX: 26.89 KG/M2

## 2025-01-15 DIAGNOSIS — C76.2 ABDOMINAL CARCINOMATOSIS (HCC): ICD-10-CM

## 2025-01-15 DIAGNOSIS — C56.3 MALIGNANT NEOPLASM OF BOTH OVARIES (HCC): Primary | ICD-10-CM

## 2025-01-15 PROCEDURE — 1090F PRES/ABSN URINE INCON ASSESS: CPT | Performed by: INTERNAL MEDICINE

## 2025-01-15 PROCEDURE — 1123F ACP DISCUSS/DSCN MKR DOCD: CPT | Performed by: INTERNAL MEDICINE

## 2025-01-15 PROCEDURE — 99211 OFF/OP EST MAY X REQ PHY/QHP: CPT | Performed by: INTERNAL MEDICINE

## 2025-01-15 PROCEDURE — 1036F TOBACCO NON-USER: CPT | Performed by: INTERNAL MEDICINE

## 2025-01-15 PROCEDURE — 1126F AMNT PAIN NOTED NONE PRSNT: CPT | Performed by: INTERNAL MEDICINE

## 2025-01-15 PROCEDURE — 99214 OFFICE O/P EST MOD 30 MIN: CPT | Performed by: INTERNAL MEDICINE

## 2025-01-15 PROCEDURE — G8417 CALC BMI ABV UP PARAM F/U: HCPCS | Performed by: INTERNAL MEDICINE

## 2025-01-15 PROCEDURE — G8400 PT W/DXA NO RESULTS DOC: HCPCS | Performed by: INTERNAL MEDICINE

## 2025-01-15 PROCEDURE — G8427 DOCREV CUR MEDS BY ELIG CLIN: HCPCS | Performed by: INTERNAL MEDICINE

## 2025-01-15 RX ORDER — PALONOSETRON 0.05 MG/ML
0.25 INJECTION, SOLUTION INTRAVENOUS ONCE
Status: CANCELLED | OUTPATIENT
Start: 2025-01-17 | End: 2025-01-17

## 2025-01-15 RX ORDER — PROCHLORPERAZINE EDISYLATE 5 MG/ML
5 INJECTION INTRAMUSCULAR; INTRAVENOUS
Status: CANCELLED | OUTPATIENT
Start: 2025-01-17

## 2025-01-15 RX ORDER — HEPARIN SODIUM (PORCINE) LOCK FLUSH IV SOLN 100 UNIT/ML 100 UNIT/ML
500 SOLUTION INTRAVENOUS PRN
Status: CANCELLED | OUTPATIENT
Start: 2025-01-17

## 2025-01-15 RX ORDER — DIPHENHYDRAMINE HYDROCHLORIDE 50 MG/ML
50 INJECTION INTRAMUSCULAR; INTRAVENOUS
Status: CANCELLED | OUTPATIENT
Start: 2025-01-17

## 2025-01-15 RX ORDER — EPINEPHRINE 1 MG/ML
0.3 INJECTION, SOLUTION, CONCENTRATE INTRAVENOUS PRN
Status: CANCELLED | OUTPATIENT
Start: 2025-01-17

## 2025-01-15 RX ORDER — ONDANSETRON 2 MG/ML
8 INJECTION INTRAMUSCULAR; INTRAVENOUS
Status: CANCELLED | OUTPATIENT
Start: 2025-01-17

## 2025-01-15 RX ORDER — FAMOTIDINE 10 MG/ML
20 INJECTION, SOLUTION INTRAVENOUS ONCE
Status: CANCELLED | OUTPATIENT
Start: 2025-01-17 | End: 2025-01-17

## 2025-01-15 RX ORDER — ALBUTEROL SULFATE 90 UG/1
4 INHALANT RESPIRATORY (INHALATION) PRN
Status: CANCELLED | OUTPATIENT
Start: 2025-01-17

## 2025-01-15 RX ORDER — SODIUM CHLORIDE 0.9 % (FLUSH) 0.9 %
5-40 SYRINGE (ML) INJECTION PRN
Status: CANCELLED | OUTPATIENT
Start: 2025-01-17

## 2025-01-15 RX ORDER — DIPHENHYDRAMINE HYDROCHLORIDE 50 MG/ML
50 INJECTION INTRAMUSCULAR; INTRAVENOUS ONCE
Status: CANCELLED | OUTPATIENT
Start: 2025-01-17 | End: 2025-01-17

## 2025-01-15 RX ORDER — SODIUM CHLORIDE 9 MG/ML
INJECTION, SOLUTION INTRAVENOUS CONTINUOUS
Status: CANCELLED | OUTPATIENT
Start: 2025-01-17

## 2025-01-15 RX ORDER — ACETAMINOPHEN 325 MG/1
650 TABLET ORAL
Status: CANCELLED | OUTPATIENT
Start: 2025-01-17

## 2025-01-15 RX ORDER — SODIUM CHLORIDE 9 MG/ML
5-250 INJECTION, SOLUTION INTRAVENOUS PRN
Status: CANCELLED | OUTPATIENT
Start: 2025-01-17

## 2025-01-15 RX ORDER — FAMOTIDINE 10 MG/ML
20 INJECTION, SOLUTION INTRAVENOUS
Status: CANCELLED | OUTPATIENT
Start: 2025-01-17

## 2025-01-15 RX ORDER — HYDROCORTISONE SODIUM SUCCINATE 100 MG/2ML
100 INJECTION INTRAMUSCULAR; INTRAVENOUS
Status: CANCELLED | OUTPATIENT
Start: 2025-01-17

## 2025-01-15 NOTE — PATIENT INSTRUCTIONS
Proceed with chemo as planned.   Please add  to pre chemo labs   RV 3 weeks  Will have scan next week

## 2025-01-15 NOTE — TELEPHONE ENCOUNTER
Name: Ruth Hutton  : 1940  MRN: [id number]    Oncology Navigation Follow-Up Note    Notes: Chart reviewed.  Treatment planned 25 with follow up 25.       Electronically signed by Keerthi Kurtz RN on 1/15/2025 at 2:52 PM

## 2025-01-17 ENCOUNTER — HOSPITAL ENCOUNTER (OUTPATIENT)
Dept: INFUSION THERAPY | Age: 85
Discharge: HOME | End: 2025-01-17
Payer: MEDICARE

## 2025-01-17 VITALS
RESPIRATION RATE: 18 BRPM | HEIGHT: 62 IN | TEMPERATURE: 97.5 F | BODY MASS INDEX: 26.68 KG/M2 | HEART RATE: 97 BPM | WEIGHT: 145 LBS | SYSTOLIC BLOOD PRESSURE: 165 MMHG | DIASTOLIC BLOOD PRESSURE: 76 MMHG

## 2025-01-17 DIAGNOSIS — C76.2 ABDOMINAL CARCINOMATOSIS (HCC): ICD-10-CM

## 2025-01-17 DIAGNOSIS — C56.3 MALIGNANT NEOPLASM OF BOTH OVARIES (HCC): Primary | ICD-10-CM

## 2025-01-17 LAB
ALBUMIN SERPL-MCNC: 4.1 G/DL (ref 3.5–5.2)
ALBUMIN/GLOB SERPL: 1.7 {RATIO} (ref 1–2.5)
ALP SERPL-CCNC: 78 U/L (ref 35–104)
ALT SERPL-CCNC: 8 U/L (ref 10–35)
ANION GAP SERPL CALCULATED.3IONS-SCNC: 13 MMOL/L (ref 9–16)
AST SERPL-CCNC: 15 U/L (ref 10–35)
BASOPHILS # BLD: 0 K/UL (ref 0–0.2)
BASOPHILS NFR BLD: 0 % (ref 0–2)
BILIRUB SERPL-MCNC: <0.2 MG/DL (ref 0–1.2)
BUN SERPL-MCNC: 16 MG/DL (ref 8–23)
BUN/CREAT SERPL: 23 (ref 9–20)
CALCIUM SERPL-MCNC: 9.4 MG/DL (ref 8.6–10.4)
CANCER AG125 SERPL-ACNC: 15 U/ML (ref 0–38)
CHLORIDE SERPL-SCNC: 102 MMOL/L (ref 98–107)
CO2 SERPL-SCNC: 24 MMOL/L (ref 20–31)
CREAT SERPL-MCNC: 0.7 MG/DL (ref 0.5–0.9)
EOSINOPHIL # BLD: 0 K/UL (ref 0–0.44)
EOSINOPHILS RELATIVE PERCENT: 0 % (ref 1–4)
ERYTHROCYTE [DISTWIDTH] IN BLOOD BY AUTOMATED COUNT: 14.6 % (ref 11.8–14.4)
GFR, ESTIMATED: 84 ML/MIN/1.73M2
GLUCOSE SERPL-MCNC: 180 MG/DL (ref 74–99)
HCT VFR BLD AUTO: 34.9 % (ref 36.3–47.1)
HGB BLD-MCNC: 11.5 G/DL (ref 11.9–15.1)
IMM GRANULOCYTES # BLD AUTO: 0 K/UL (ref 0–0.3)
IMM GRANULOCYTES NFR BLD: 0 %
LYMPHOCYTES NFR BLD: 0.25 K/UL (ref 1.1–3.7)
LYMPHOCYTES RELATIVE PERCENT: 4 % (ref 24–43)
MCH RBC QN AUTO: 29.3 PG (ref 25.2–33.5)
MCHC RBC AUTO-ENTMCNC: 33 G/DL (ref 28.4–34.8)
MCV RBC AUTO: 88.8 FL (ref 82.6–102.9)
MONOCYTES NFR BLD: 0 % (ref 3–12)
MONOCYTES NFR BLD: 0 K/UL (ref 0.1–1.2)
MORPHOLOGY: NORMAL
NEUTROPHILS NFR BLD: 96 % (ref 36–65)
NEUTS SEG NFR BLD: 6.05 K/UL (ref 1.5–8.1)
NRBC BLD-RTO: 0 PER 100 WBC
PLATELET # BLD AUTO: 241 K/UL (ref 138–453)
PMV BLD AUTO: 8.6 FL (ref 8.1–13.5)
POTASSIUM SERPL-SCNC: 3.8 MMOL/L (ref 3.7–5.3)
PROT SERPL-MCNC: 6.5 G/DL (ref 6.6–8.7)
RBC # BLD AUTO: 3.93 M/UL (ref 3.95–5.11)
SODIUM SERPL-SCNC: 139 MMOL/L (ref 136–145)
WBC OTHER # BLD: 6.3 K/UL (ref 3.5–11.3)

## 2025-01-17 PROCEDURE — 6360000002 HC RX W HCPCS: Performed by: INTERNAL MEDICINE

## 2025-01-17 PROCEDURE — 80053 COMPREHEN METABOLIC PANEL: CPT

## 2025-01-17 PROCEDURE — 96413 CHEMO IV INFUSION 1 HR: CPT

## 2025-01-17 PROCEDURE — 85025 COMPLETE CBC W/AUTO DIFF WBC: CPT

## 2025-01-17 PROCEDURE — 96367 TX/PROPH/DG ADDL SEQ IV INF: CPT

## 2025-01-17 PROCEDURE — 2580000003 HC RX 258: Performed by: INTERNAL MEDICINE

## 2025-01-17 PROCEDURE — 86304 IMMUNOASSAY TUMOR CA 125: CPT

## 2025-01-17 PROCEDURE — 2500000003 HC RX 250 WO HCPCS: Performed by: INTERNAL MEDICINE

## 2025-01-17 PROCEDURE — 96375 TX/PRO/DX INJ NEW DRUG ADDON: CPT

## 2025-01-17 PROCEDURE — 96415 CHEMO IV INFUSION ADDL HR: CPT

## 2025-01-17 PROCEDURE — 96417 CHEMO IV INFUS EACH ADDL SEQ: CPT

## 2025-01-17 RX ORDER — SODIUM CHLORIDE 9 MG/ML
5-250 INJECTION, SOLUTION INTRAVENOUS PRN
Status: DISCONTINUED | OUTPATIENT
Start: 2025-01-17 | End: 2025-01-17

## 2025-01-17 RX ORDER — SODIUM CHLORIDE 0.9 % (FLUSH) 0.9 %
5-40 SYRINGE (ML) INJECTION PRN
Status: DISCONTINUED | OUTPATIENT
Start: 2025-01-17 | End: 2025-01-17

## 2025-01-17 RX ORDER — PALONOSETRON 0.05 MG/ML
0.25 INJECTION, SOLUTION INTRAVENOUS ONCE
Status: COMPLETED | OUTPATIENT
Start: 2025-01-17 | End: 2025-01-17

## 2025-01-17 RX ORDER — DIPHENHYDRAMINE HYDROCHLORIDE 50 MG/ML
50 INJECTION INTRAMUSCULAR; INTRAVENOUS ONCE
Status: COMPLETED | OUTPATIENT
Start: 2025-01-17 | End: 2025-01-17

## 2025-01-17 RX ORDER — DEXAMETHASONE SODIUM PHOSPHATE 10 MG/ML
10 INJECTION, SOLUTION INTRAMUSCULAR; INTRAVENOUS ONCE
Status: COMPLETED | OUTPATIENT
Start: 2025-01-17 | End: 2025-01-17

## 2025-01-17 RX ORDER — HEPARIN 100 UNIT/ML
500 SYRINGE INTRAVENOUS PRN
Status: DISCONTINUED | OUTPATIENT
Start: 2025-01-17 | End: 2025-01-17

## 2025-01-17 RX ORDER — FAMOTIDINE 10 MG/ML
20 INJECTION, SOLUTION INTRAVENOUS ONCE
Status: COMPLETED | OUTPATIENT
Start: 2025-01-17 | End: 2025-01-17

## 2025-01-17 RX ADMIN — CARBOPLATIN 390 MG: 10 INJECTION, SOLUTION INTRAVENOUS at 14:31

## 2025-01-17 RX ADMIN — DIPHENHYDRAMINE HYDROCHLORIDE 50 MG: 50 INJECTION INTRAMUSCULAR; INTRAVENOUS at 10:22

## 2025-01-17 RX ADMIN — PALONOSETRON 0.25 MG: 0.05 INJECTION, SOLUTION INTRAVENOUS at 10:22

## 2025-01-17 RX ADMIN — PACLITAXEL 294 MG: 6 INJECTION, SOLUTION INTRAVENOUS at 11:12

## 2025-01-17 RX ADMIN — SODIUM CHLORIDE 100 ML/HR: 0.9 INJECTION, SOLUTION INTRAVENOUS at 10:21

## 2025-01-17 RX ADMIN — SODIUM CHLORIDE, PRESERVATIVE FREE 10 ML: 5 INJECTION INTRAVENOUS at 09:21

## 2025-01-17 RX ADMIN — DEXAMETHASONE SODIUM PHOSPHATE 10 MG: 10 INJECTION INTRAMUSCULAR; INTRAVENOUS at 10:22

## 2025-01-17 RX ADMIN — SODIUM CHLORIDE, PRESERVATIVE FREE 10 ML: 5 INJECTION INTRAVENOUS at 09:20

## 2025-01-17 RX ADMIN — FAMOTIDINE 20 MG: 10 INJECTION, SOLUTION INTRAVENOUS at 10:22

## 2025-01-17 RX ADMIN — SODIUM CHLORIDE 150 MG: 9 INJECTION, SOLUTION INTRAVENOUS at 10:42

## 2025-02-05 ENCOUNTER — OFFICE VISIT (OUTPATIENT)
Dept: ONCOLOGY | Age: 85
End: 2025-02-05
Payer: MEDICARE

## 2025-02-05 ENCOUNTER — TELEPHONE (OUTPATIENT)
Dept: ONCOLOGY | Age: 85
End: 2025-02-05

## 2025-02-05 VITALS
TEMPERATURE: 97.8 F | RESPIRATION RATE: 18 BRPM | SYSTOLIC BLOOD PRESSURE: 147 MMHG | WEIGHT: 147 LBS | DIASTOLIC BLOOD PRESSURE: 86 MMHG | HEART RATE: 93 BPM | BODY MASS INDEX: 26.89 KG/M2

## 2025-02-05 DIAGNOSIS — I10 HYPERTENSION, UNSPECIFIED TYPE: ICD-10-CM

## 2025-02-05 DIAGNOSIS — C56.3 MALIGNANT NEOPLASM OF BOTH OVARIES (HCC): Primary | ICD-10-CM

## 2025-02-05 DIAGNOSIS — C76.2 ABDOMINAL CARCINOMATOSIS (HCC): ICD-10-CM

## 2025-02-05 PROCEDURE — 3077F SYST BP >= 140 MM HG: CPT | Performed by: INTERNAL MEDICINE

## 2025-02-05 PROCEDURE — 99211 OFF/OP EST MAY X REQ PHY/QHP: CPT | Performed by: INTERNAL MEDICINE

## 2025-02-05 PROCEDURE — 1159F MED LIST DOCD IN RCRD: CPT | Performed by: INTERNAL MEDICINE

## 2025-02-05 PROCEDURE — 1090F PRES/ABSN URINE INCON ASSESS: CPT | Performed by: INTERNAL MEDICINE

## 2025-02-05 PROCEDURE — 1123F ACP DISCUSS/DSCN MKR DOCD: CPT | Performed by: INTERNAL MEDICINE

## 2025-02-05 PROCEDURE — G8400 PT W/DXA NO RESULTS DOC: HCPCS | Performed by: INTERNAL MEDICINE

## 2025-02-05 PROCEDURE — 1036F TOBACCO NON-USER: CPT | Performed by: INTERNAL MEDICINE

## 2025-02-05 PROCEDURE — G8417 CALC BMI ABV UP PARAM F/U: HCPCS | Performed by: INTERNAL MEDICINE

## 2025-02-05 PROCEDURE — 1126F AMNT PAIN NOTED NONE PRSNT: CPT | Performed by: INTERNAL MEDICINE

## 2025-02-05 PROCEDURE — 99215 OFFICE O/P EST HI 40 MIN: CPT | Performed by: INTERNAL MEDICINE

## 2025-02-05 PROCEDURE — 3078F DIAST BP <80 MM HG: CPT | Performed by: INTERNAL MEDICINE

## 2025-02-05 PROCEDURE — G8427 DOCREV CUR MEDS BY ELIG CLIN: HCPCS | Performed by: INTERNAL MEDICINE

## 2025-02-05 RX ORDER — ACETAMINOPHEN 325 MG/1
650 TABLET ORAL
OUTPATIENT
Start: 2025-02-14

## 2025-02-05 RX ORDER — EPINEPHRINE 1 MG/ML
0.3 INJECTION, SOLUTION, CONCENTRATE INTRAVENOUS PRN
OUTPATIENT
Start: 2025-02-14

## 2025-02-05 RX ORDER — DIPHENHYDRAMINE HYDROCHLORIDE 50 MG/ML
50 INJECTION INTRAMUSCULAR; INTRAVENOUS
OUTPATIENT
Start: 2025-02-14

## 2025-02-05 RX ORDER — ALBUTEROL SULFATE 90 UG/1
4 INHALANT RESPIRATORY (INHALATION) PRN
OUTPATIENT
Start: 2025-02-14

## 2025-02-05 RX ORDER — SODIUM CHLORIDE 9 MG/ML
5-250 INJECTION, SOLUTION INTRAVENOUS PRN
OUTPATIENT
Start: 2025-02-14

## 2025-02-05 RX ORDER — HEPARIN SODIUM (PORCINE) LOCK FLUSH IV SOLN 100 UNIT/ML 100 UNIT/ML
500 SOLUTION INTRAVENOUS PRN
OUTPATIENT
Start: 2025-02-14

## 2025-02-05 RX ORDER — ONDANSETRON 2 MG/ML
8 INJECTION INTRAMUSCULAR; INTRAVENOUS
OUTPATIENT
Start: 2025-02-14

## 2025-02-05 RX ORDER — HYDROCORTISONE SODIUM SUCCINATE 100 MG/2ML
100 INJECTION INTRAMUSCULAR; INTRAVENOUS
OUTPATIENT
Start: 2025-02-14

## 2025-02-05 RX ORDER — SODIUM CHLORIDE 9 MG/ML
INJECTION, SOLUTION INTRAVENOUS CONTINUOUS
OUTPATIENT
Start: 2025-02-14

## 2025-02-05 RX ORDER — MEPERIDINE HYDROCHLORIDE 50 MG/ML
12.5 INJECTION INTRAMUSCULAR; INTRAVENOUS; SUBCUTANEOUS PRN
OUTPATIENT
Start: 2025-02-14

## 2025-02-05 RX ORDER — SODIUM CHLORIDE 0.9 % (FLUSH) 0.9 %
5-40 SYRINGE (ML) INJECTION PRN
OUTPATIENT
Start: 2025-02-14

## 2025-02-05 RX ORDER — FAMOTIDINE 10 MG/ML
20 INJECTION, SOLUTION INTRAVENOUS
OUTPATIENT
Start: 2025-02-14

## 2025-02-05 NOTE — PATIENT INSTRUCTIONS
Start maintenance bevacizumab in 2 weeks if possible   Need cbc, cmp  with each treatment   Rv to see us in 2 months.

## 2025-02-05 NOTE — PROGRESS NOTES
DIAGNOSIS:   Serous ovarian cancer  Abdominal carcinomatosis  CURRENT THERAPY:  Starting neoadjuvant chemotherapy, started with single agent carboplatin in August/24  Chemotherapy changed to Taxol carboplatin starting September/24  S/p debulking surgery 11/26/24 after 4 cycles of neoadjuvant chemotherapy.   Completed 6 cycles of chemotherapy in January/2025  Plan maintenance bevacizumab starting in February/2025  BRIEF CASE HISTORY:   Ms. Ruth Hutton is a very pleasant 84 y.o. female with history of multiple co morbidities as listed.  Patient is referred for evaluation and further management of pelvic mass.  Concerning for malignancy.  The patient had an episode of pelvic pain about 3 weeks back.  She had urinary urgency.  No hematuria.  She was evaluated with ultrasound of urinary bladder and CT scan of the pelvis.  She was found to have large both cystic and solid lobulated masses in the pelvis greater on the left than the right suspicious for ovarian carcinoma versus metastatic versus primary peritoneal carcinoma.  The patient had MRI done but no results yet.  Clinically she is doing fine.  No pain at the present time.  No GYN symptoms.  No active bleeding.  No spotting.  No urinary symptoms.  No GI symptoms.  No weight loss or decreased appetite.  No fever or night sweats.  Patient had history of hysterectomy and unilateral oophorectomy about 4 years ago.  Patient denies smoking or alcohol drinking.  The patient was seen and sent for GYN oncology consultation.  Decision was to proceed with neoadjuvant chemotherapy.  To be followed by surgical intervention after 3-4 cycles  The patient had significant trepidation about chemotherapy so we decided to give her single agent carboplatin with cycle 1 with a plan to transition to Taxol carboplatin with cycle 2.  The patient received 4 cycles of chemotherapy with plans to debulk

## 2025-02-05 NOTE — TELEPHONE ENCOUNTER
Name: Ruth Hutton  : 1940  MRN: [id number]    Oncology Navigation Follow-Up Note    Contact Type:  Medical Oncology    Notes: Met with Ruth and family member in clinic.  Ruth states she is doing well and is keeping busy. Ruth is concerned about a procedure she is to have 25.  Dr. Mcdaniel discussed cysto with patient and she is in agreement.  Ruth instructed to take blood pressure twice a day and to bring log with her to her first treatment.  Ruth voiced instructions back.  Active listening and encouragement given. Pt aware she will be contacted to schedule treatment.  Denies any further needs from navigation.  Encouraged her to call with questions or needs.    Electronically signed by Keerthi Kurtz RN on 2025 at 5:07 PM

## 2025-02-17 ENCOUNTER — TELEPHONE (OUTPATIENT)
Dept: ONCOLOGY | Age: 85
End: 2025-02-17

## 2025-02-17 NOTE — TELEPHONE ENCOUNTER
Name: Ruth Hutton  : 1940  MRN: [id number]    Oncology Navigation Follow-Up Note    Contact Type:  Telephone    Notes:  Call made to Ruth for ONN follow up.  No answer.  Left message on answering machine reminding patient to bring PB log.  Encouraged Ruth to call with questions or needs.     This nurse updated Charge Nurse, Constance JIMÉNEZ RN about BP instructions from Dr. Mcdaniel.    Electronically signed by Keerthi Kurtz RN on 2025 at 2:04 PM

## 2025-02-18 ENCOUNTER — HOSPITAL ENCOUNTER (OUTPATIENT)
Dept: INFUSION THERAPY | Age: 85
Discharge: HOME | End: 2025-02-18
Payer: MEDICARE

## 2025-02-18 VITALS
RESPIRATION RATE: 16 BRPM | HEART RATE: 74 BPM | WEIGHT: 145 LBS | BODY MASS INDEX: 26.68 KG/M2 | SYSTOLIC BLOOD PRESSURE: 124 MMHG | TEMPERATURE: 97.4 F | DIASTOLIC BLOOD PRESSURE: 77 MMHG | HEIGHT: 62 IN

## 2025-02-18 DIAGNOSIS — C76.2 ABDOMINAL CARCINOMATOSIS (HCC): Primary | ICD-10-CM

## 2025-02-18 DIAGNOSIS — C56.3 MALIGNANT NEOPLASM OF BOTH OVARIES (HCC): ICD-10-CM

## 2025-02-18 LAB
ALBUMIN SERPL-MCNC: 4.2 G/DL (ref 3.5–5.2)
ALBUMIN/GLOB SERPL: 1.9 {RATIO} (ref 1–2.5)
ALP SERPL-CCNC: 69 U/L (ref 35–104)
ALT SERPL-CCNC: 9 U/L (ref 10–35)
ANION GAP SERPL CALCULATED.3IONS-SCNC: 8 MMOL/L (ref 9–16)
AST SERPL-CCNC: 14 U/L (ref 10–35)
BACTERIA URNS QL MICRO: ABNORMAL
BASOPHILS # BLD: 0.03 K/UL (ref 0–0.2)
BASOPHILS NFR BLD: 1 % (ref 0–2)
BILIRUB SERPL-MCNC: 0.2 MG/DL (ref 0–1.2)
BILIRUB UR QL STRIP: NEGATIVE
BUN SERPL-MCNC: 15 MG/DL (ref 8–23)
BUN/CREAT SERPL: 25 (ref 9–20)
CALCIUM SERPL-MCNC: 9.2 MG/DL (ref 8.6–10.4)
CHLORIDE SERPL-SCNC: 102 MMOL/L (ref 98–107)
CLARITY UR: CLEAR
CO2 SERPL-SCNC: 29 MMOL/L (ref 20–31)
COLOR UR: YELLOW
CREAT SERPL-MCNC: 0.6 MG/DL (ref 0.5–0.9)
EOSINOPHIL # BLD: 0.14 K/UL (ref 0–0.44)
EOSINOPHILS RELATIVE PERCENT: 3 % (ref 1–4)
EPI CELLS #/AREA URNS HPF: ABNORMAL /HPF (ref 0–25)
ERYTHROCYTE [DISTWIDTH] IN BLOOD BY AUTOMATED COUNT: 15.7 % (ref 11.8–14.4)
GFR, ESTIMATED: 89 ML/MIN/1.73M2
GLUCOSE SERPL-MCNC: 96 MG/DL (ref 74–99)
GLUCOSE UR STRIP-MCNC: NEGATIVE MG/DL
HCT VFR BLD AUTO: 34.3 % (ref 36.3–47.1)
HGB BLD-MCNC: 11.3 G/DL (ref 11.9–15.1)
HGB UR QL STRIP.AUTO: NEGATIVE
IMM GRANULOCYTES # BLD AUTO: <0.03 K/UL (ref 0–0.3)
IMM GRANULOCYTES NFR BLD: 0 %
KETONES UR STRIP-MCNC: NEGATIVE MG/DL
LEUKOCYTE ESTERASE UR QL STRIP: ABNORMAL
LYMPHOCYTES NFR BLD: 0.78 K/UL (ref 1.1–3.7)
LYMPHOCYTES RELATIVE PERCENT: 15 % (ref 24–43)
MCH RBC QN AUTO: 29.1 PG (ref 25.2–33.5)
MCHC RBC AUTO-ENTMCNC: 32.9 G/DL (ref 28.4–34.8)
MCV RBC AUTO: 88.4 FL (ref 82.6–102.9)
MONOCYTES NFR BLD: 0.56 K/UL (ref 0.1–1.2)
MONOCYTES NFR BLD: 11 % (ref 3–12)
NEUTROPHILS NFR BLD: 70 % (ref 36–65)
NEUTS SEG NFR BLD: 3.82 K/UL (ref 1.5–8.1)
NITRITE UR QL STRIP: NEGATIVE
NRBC BLD-RTO: 0 PER 100 WBC
PH UR STRIP: 7 [PH] (ref 5–9)
PLATELET # BLD AUTO: 204 K/UL (ref 138–453)
PMV BLD AUTO: 9.1 FL (ref 8.1–13.5)
POTASSIUM SERPL-SCNC: 4 MMOL/L (ref 3.7–5.3)
PROT SERPL-MCNC: 6.3 G/DL (ref 6.6–8.7)
PROT UR STRIP-MCNC: NEGATIVE MG/DL
RBC # BLD AUTO: 3.88 M/UL (ref 3.95–5.11)
RBC #/AREA URNS HPF: ABNORMAL /HPF (ref 0–2)
SODIUM SERPL-SCNC: 139 MMOL/L (ref 136–145)
SP GR UR STRIP: 1.01 (ref 1.01–1.02)
UROBILINOGEN UR STRIP-ACNC: NORMAL EU/DL (ref 0–1)
WBC #/AREA URNS HPF: ABNORMAL /HPF (ref 0–5)
WBC OTHER # BLD: 5.3 K/UL (ref 3.5–11.3)

## 2025-02-18 PROCEDURE — 81001 URINALYSIS AUTO W/SCOPE: CPT

## 2025-02-18 PROCEDURE — 2500000003 HC RX 250 WO HCPCS: Performed by: INTERNAL MEDICINE

## 2025-02-18 PROCEDURE — 2580000003 HC RX 258: Performed by: INTERNAL MEDICINE

## 2025-02-18 PROCEDURE — 86304 IMMUNOASSAY TUMOR CA 125: CPT

## 2025-02-18 PROCEDURE — 85025 COMPLETE CBC W/AUTO DIFF WBC: CPT

## 2025-02-18 PROCEDURE — 6360000002 HC RX W HCPCS: Performed by: INTERNAL MEDICINE

## 2025-02-18 PROCEDURE — 80053 COMPREHEN METABOLIC PANEL: CPT

## 2025-02-18 PROCEDURE — 96413 CHEMO IV INFUSION 1 HR: CPT

## 2025-02-18 RX ORDER — SODIUM CHLORIDE 9 MG/ML
5-250 INJECTION, SOLUTION INTRAVENOUS PRN
Status: DISCONTINUED | OUTPATIENT
Start: 2025-02-18 | End: 2025-02-19 | Stop reason: HOSPADM

## 2025-02-18 RX ORDER — HEPARIN 100 UNIT/ML
500 SYRINGE INTRAVENOUS PRN
Status: DISCONTINUED | OUTPATIENT
Start: 2025-02-18 | End: 2025-02-19 | Stop reason: HOSPADM

## 2025-02-18 RX ORDER — SODIUM CHLORIDE 0.9 % (FLUSH) 0.9 %
5-40 SYRINGE (ML) INJECTION PRN
Status: DISCONTINUED | OUTPATIENT
Start: 2025-02-18 | End: 2025-02-19 | Stop reason: HOSPADM

## 2025-02-18 RX ADMIN — SODIUM CHLORIDE 100 ML/HR: 9 INJECTION, SOLUTION INTRAVENOUS at 11:50

## 2025-02-18 RX ADMIN — SODIUM CHLORIDE, PRESERVATIVE FREE 10 ML: 5 INJECTION INTRAVENOUS at 13:08

## 2025-02-18 RX ADMIN — HEPARIN 500 UNITS: 100 SYRINGE at 13:09

## 2025-02-18 RX ADMIN — SODIUM CHLORIDE, PRESERVATIVE FREE 10 ML: 5 INJECTION INTRAVENOUS at 11:11

## 2025-02-18 RX ADMIN — SODIUM CHLORIDE, PRESERVATIVE FREE 10 ML: 5 INJECTION INTRAVENOUS at 11:10

## 2025-02-18 RX ADMIN — SODIUM CHLORIDE 1000 MG: 9 INJECTION, SOLUTION INTRAVENOUS at 12:31

## 2025-02-18 RX ADMIN — SODIUM CHLORIDE, PRESERVATIVE FREE 10 ML: 5 INJECTION INTRAVENOUS at 13:09

## 2025-02-18 NOTE — PROGRESS NOTES
Initial Visit and very engaging in conversation.  They knew the [facility] at the [facility].  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  Open and receptive.   A general prayer said with both of them.  They are  65 years.  Very attentive to each other and supportive.  Continue to visit and to support.  An assurance of prayers given.  See Flowsheet.    Sister Maru Raya, OSF/T  BCC

## 2025-02-19 LAB — CANCER AG125 SERPL-ACNC: 10 U/ML (ref 0–38)

## 2025-03-05 DIAGNOSIS — C76.2 ABDOMINAL CARCINOMATOSIS (HCC): Primary | ICD-10-CM

## 2025-03-05 DIAGNOSIS — C56.3 MALIGNANT NEOPLASM OF BOTH OVARIES (HCC): ICD-10-CM

## 2025-03-05 RX ORDER — ONDANSETRON 2 MG/ML
8 INJECTION INTRAMUSCULAR; INTRAVENOUS
OUTPATIENT
Start: 2025-03-11

## 2025-03-05 RX ORDER — SODIUM CHLORIDE 9 MG/ML
INJECTION, SOLUTION INTRAVENOUS CONTINUOUS
OUTPATIENT
Start: 2025-03-11

## 2025-03-05 RX ORDER — SODIUM CHLORIDE 0.9 % (FLUSH) 0.9 %
5-40 SYRINGE (ML) INJECTION PRN
OUTPATIENT
Start: 2025-03-11

## 2025-03-05 RX ORDER — ACETAMINOPHEN 325 MG/1
650 TABLET ORAL
OUTPATIENT
Start: 2025-03-11

## 2025-03-05 RX ORDER — SODIUM CHLORIDE 9 MG/ML
5-250 INJECTION, SOLUTION INTRAVENOUS PRN
OUTPATIENT
Start: 2025-03-11

## 2025-03-05 RX ORDER — EPINEPHRINE 1 MG/ML
0.3 INJECTION, SOLUTION, CONCENTRATE INTRAVENOUS PRN
OUTPATIENT
Start: 2025-03-11

## 2025-03-05 RX ORDER — MEPERIDINE HYDROCHLORIDE 50 MG/ML
12.5 INJECTION INTRAMUSCULAR; INTRAVENOUS; SUBCUTANEOUS PRN
OUTPATIENT
Start: 2025-03-11

## 2025-03-05 RX ORDER — DIPHENHYDRAMINE HYDROCHLORIDE 50 MG/ML
50 INJECTION INTRAMUSCULAR; INTRAVENOUS
OUTPATIENT
Start: 2025-03-11

## 2025-03-05 RX ORDER — FAMOTIDINE 10 MG/ML
20 INJECTION, SOLUTION INTRAVENOUS
OUTPATIENT
Start: 2025-03-11

## 2025-03-05 RX ORDER — HYDROCORTISONE SODIUM SUCCINATE 100 MG/2ML
100 INJECTION INTRAMUSCULAR; INTRAVENOUS
OUTPATIENT
Start: 2025-03-11

## 2025-03-05 RX ORDER — HEPARIN SODIUM (PORCINE) LOCK FLUSH IV SOLN 100 UNIT/ML 100 UNIT/ML
500 SOLUTION INTRAVENOUS PRN
OUTPATIENT
Start: 2025-03-11

## 2025-03-05 RX ORDER — ALBUTEROL SULFATE 90 UG/1
4 INHALANT RESPIRATORY (INHALATION) PRN
OUTPATIENT
Start: 2025-03-11

## 2025-03-11 ENCOUNTER — HOSPITAL ENCOUNTER (OUTPATIENT)
Dept: INFUSION THERAPY | Age: 85
Discharge: HOME | End: 2025-03-11
Payer: MEDICARE

## 2025-03-11 VITALS
BODY MASS INDEX: 27.97 KG/M2 | TEMPERATURE: 97.4 F | HEIGHT: 62 IN | SYSTOLIC BLOOD PRESSURE: 200 MMHG | WEIGHT: 152 LBS | RESPIRATION RATE: 18 BRPM | HEART RATE: 72 BPM | DIASTOLIC BLOOD PRESSURE: 90 MMHG

## 2025-03-11 DIAGNOSIS — C76.2 ABDOMINAL CARCINOMATOSIS (HCC): Primary | ICD-10-CM

## 2025-03-11 DIAGNOSIS — C76.2 ABDOMINAL CARCINOMATOSIS (HCC): ICD-10-CM

## 2025-03-11 DIAGNOSIS — C56.3 MALIGNANT NEOPLASM OF BOTH OVARIES (HCC): Primary | ICD-10-CM

## 2025-03-11 LAB
ALBUMIN SERPL-MCNC: 3.8 G/DL (ref 3.5–5.2)
ALBUMIN/GLOB SERPL: 1.7 {RATIO} (ref 1–2.5)
ALP SERPL-CCNC: 69 U/L (ref 35–104)
ALT SERPL-CCNC: 10 U/L (ref 10–35)
ANION GAP SERPL CALCULATED.3IONS-SCNC: 8 MMOL/L (ref 9–16)
AST SERPL-CCNC: 16 U/L (ref 10–35)
BASOPHILS # BLD: 0.03 K/UL (ref 0–0.2)
BASOPHILS NFR BLD: 1 % (ref 0–2)
BILIRUB SERPL-MCNC: 0.2 MG/DL (ref 0–1.2)
BILIRUB UR QL STRIP: NEGATIVE
BUN SERPL-MCNC: 19 MG/DL (ref 8–23)
BUN/CREAT SERPL: 27 (ref 9–20)
CALCIUM SERPL-MCNC: 8.9 MG/DL (ref 8.6–10.4)
CHLORIDE SERPL-SCNC: 103 MMOL/L (ref 98–107)
CLARITY UR: CLEAR
CO2 SERPL-SCNC: 29 MMOL/L (ref 20–31)
COLOR UR: YELLOW
CREAT SERPL-MCNC: 0.7 MG/DL (ref 0.5–0.9)
EOSINOPHIL # BLD: 0.18 K/UL (ref 0–0.44)
EOSINOPHILS RELATIVE PERCENT: 4 % (ref 1–4)
EPI CELLS #/AREA URNS HPF: NORMAL /HPF (ref 0–25)
ERYTHROCYTE [DISTWIDTH] IN BLOOD BY AUTOMATED COUNT: 14.8 % (ref 11.8–14.4)
GFR, ESTIMATED: 85 ML/MIN/1.73M2
GLUCOSE SERPL-MCNC: 102 MG/DL (ref 74–99)
GLUCOSE UR STRIP-MCNC: NEGATIVE MG/DL
HCT VFR BLD AUTO: 30.9 % (ref 36.3–47.1)
HGB BLD-MCNC: 10.5 G/DL (ref 11.9–15.1)
HGB UR QL STRIP.AUTO: ABNORMAL
IMM GRANULOCYTES # BLD AUTO: <0.03 K/UL (ref 0–0.3)
IMM GRANULOCYTES NFR BLD: 0 %
KETONES UR STRIP-MCNC: NEGATIVE MG/DL
LEUKOCYTE ESTERASE UR QL STRIP: NEGATIVE
LYMPHOCYTES NFR BLD: 0.8 K/UL (ref 1.1–3.7)
LYMPHOCYTES RELATIVE PERCENT: 16 % (ref 24–43)
MCH RBC QN AUTO: 29.7 PG (ref 25.2–33.5)
MCHC RBC AUTO-ENTMCNC: 34 G/DL (ref 28.4–34.8)
MCV RBC AUTO: 87.3 FL (ref 82.6–102.9)
MONOCYTES NFR BLD: 0.56 K/UL (ref 0.1–1.2)
MONOCYTES NFR BLD: 11 % (ref 3–12)
NEUTROPHILS NFR BLD: 68 % (ref 36–65)
NEUTS SEG NFR BLD: 3.52 K/UL (ref 1.5–8.1)
NITRITE UR QL STRIP: NEGATIVE
NRBC BLD-RTO: 0 PER 100 WBC
PH UR STRIP: 6.5 [PH] (ref 5–9)
PLATELET # BLD AUTO: 165 K/UL (ref 138–453)
PMV BLD AUTO: 9.1 FL (ref 8.1–13.5)
POTASSIUM SERPL-SCNC: 3.9 MMOL/L (ref 3.7–5.3)
PROT SERPL-MCNC: 6 G/DL (ref 6.6–8.7)
PROT UR STRIP-MCNC: ABNORMAL MG/DL
RBC # BLD AUTO: 3.54 M/UL (ref 3.95–5.11)
RBC #/AREA URNS HPF: NORMAL /HPF (ref 0–2)
SODIUM SERPL-SCNC: 140 MMOL/L (ref 136–145)
SP GR UR STRIP: 1.01 (ref 1.01–1.02)
UROBILINOGEN UR STRIP-ACNC: NORMAL EU/DL (ref 0–1)
WBC #/AREA URNS HPF: NORMAL /HPF (ref 0–5)
WBC OTHER # BLD: 5.1 K/UL (ref 3.5–11.3)

## 2025-03-11 PROCEDURE — 2500000003 HC RX 250 WO HCPCS: Performed by: INTERNAL MEDICINE

## 2025-03-11 PROCEDURE — 81001 URINALYSIS AUTO W/SCOPE: CPT

## 2025-03-11 PROCEDURE — 6360000002 HC RX W HCPCS: Performed by: INTERNAL MEDICINE

## 2025-03-11 PROCEDURE — 86304 IMMUNOASSAY TUMOR CA 125: CPT

## 2025-03-11 PROCEDURE — 85025 COMPLETE CBC W/AUTO DIFF WBC: CPT

## 2025-03-11 PROCEDURE — 36591 DRAW BLOOD OFF VENOUS DEVICE: CPT

## 2025-03-11 PROCEDURE — 80053 COMPREHEN METABOLIC PANEL: CPT

## 2025-03-11 RX ORDER — HEPARIN 100 UNIT/ML
500 SYRINGE INTRAVENOUS PRN
Status: DISCONTINUED | OUTPATIENT
Start: 2025-03-11 | End: 2025-03-12 | Stop reason: HOSPADM

## 2025-03-11 RX ORDER — SODIUM CHLORIDE 0.9 % (FLUSH) 0.9 %
5-40 SYRINGE (ML) INJECTION PRN
Status: DISCONTINUED | OUTPATIENT
Start: 2025-03-11 | End: 2025-03-12 | Stop reason: HOSPADM

## 2025-03-11 RX ORDER — AMLODIPINE BESYLATE 5 MG/1
5 TABLET ORAL DAILY
Qty: 30 TABLET | Refills: 0 | Status: SHIPPED | OUTPATIENT
Start: 2025-03-11

## 2025-03-11 RX ADMIN — HEPARIN 500 UNITS: 100 SYRINGE at 12:31

## 2025-03-11 RX ADMIN — SODIUM CHLORIDE, PRESERVATIVE FREE 10 ML: 5 INJECTION INTRAVENOUS at 11:04

## 2025-03-11 RX ADMIN — SODIUM CHLORIDE, PRESERVATIVE FREE 10 ML: 5 INJECTION INTRAVENOUS at 11:05

## 2025-03-11 NOTE — PROGRESS NOTES
Follow up visit.  Engaged readily in conversation.  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  Very involved.  See Flowsheet.  Prayers said with both of them.  Continue to support and visit.  An assurance of prayers given.    Sister Marudora Raya, OSF/T  BCC

## 2025-03-11 NOTE — PROGRESS NOTES
"Regarding: WI F 42 crying chest and back feels tight  ----- Message from Franco Blow sent at 1/10/2023  8:26 AM CST -----  Patient Name: Ashish Allen    Specialist or PCP Name: Delores Smith    Symptoms: crying chest and back feels tight       Call Back # :[phone]    Which State are you currently located in?: [place]    Name of Clinic Site / Acct# : [id number]    Use following scripting for patients waiting for a callback: ""Nurse callback times vary based on call volumes; please be aware the return phone call may come from an unidentified phone number. If your symptoms worsen or become life threatening while waiting, you should seek immediate assistance by calling 911 or going to the ER for evaluation. \""    " Dr Marla lomeli served regarding patient's elevated BP.  Orders received to hold treatment today and Norvasc 5 mg daily will be prescribed. Patient states understanding.

## 2025-03-12 LAB — CANCER AG125 SERPL-ACNC: 9 U/ML (ref 0–38)

## 2025-03-18 DIAGNOSIS — C76.2 ABDOMINAL CARCINOMATOSIS (HCC): Primary | ICD-10-CM

## 2025-03-18 DIAGNOSIS — C56.3 MALIGNANT NEOPLASM OF BOTH OVARIES (HCC): ICD-10-CM

## 2025-03-18 RX ORDER — ONDANSETRON 2 MG/ML
8 INJECTION INTRAMUSCULAR; INTRAVENOUS
Status: CANCELLED | OUTPATIENT
Start: 2025-03-20

## 2025-03-18 RX ORDER — DIPHENHYDRAMINE HYDROCHLORIDE 50 MG/ML
50 INJECTION, SOLUTION INTRAMUSCULAR; INTRAVENOUS
Status: CANCELLED | OUTPATIENT
Start: 2025-03-20

## 2025-03-18 RX ORDER — SODIUM CHLORIDE 9 MG/ML
5-250 INJECTION, SOLUTION INTRAVENOUS PRN
Status: CANCELLED | OUTPATIENT
Start: 2025-03-20

## 2025-03-18 RX ORDER — SODIUM CHLORIDE 9 MG/ML
INJECTION, SOLUTION INTRAVENOUS CONTINUOUS
Status: CANCELLED | OUTPATIENT
Start: 2025-03-20

## 2025-03-18 RX ORDER — ALBUTEROL SULFATE 90 UG/1
4 INHALANT RESPIRATORY (INHALATION) PRN
Status: CANCELLED | OUTPATIENT
Start: 2025-03-20

## 2025-03-18 RX ORDER — HYDROCORTISONE SODIUM SUCCINATE 100 MG/2ML
100 INJECTION INTRAMUSCULAR; INTRAVENOUS
Status: CANCELLED | OUTPATIENT
Start: 2025-03-20

## 2025-03-18 RX ORDER — ACETAMINOPHEN 325 MG/1
650 TABLET ORAL
Status: CANCELLED | OUTPATIENT
Start: 2025-03-20

## 2025-03-18 RX ORDER — EPINEPHRINE 1 MG/ML
0.3 INJECTION, SOLUTION, CONCENTRATE INTRAVENOUS PRN
Status: CANCELLED | OUTPATIENT
Start: 2025-03-20

## 2025-03-18 RX ORDER — MEPERIDINE HYDROCHLORIDE 50 MG/ML
12.5 INJECTION INTRAMUSCULAR; INTRAVENOUS; SUBCUTANEOUS PRN
Status: CANCELLED | OUTPATIENT
Start: 2025-03-20

## 2025-03-18 RX ORDER — HEPARIN SODIUM (PORCINE) LOCK FLUSH IV SOLN 100 UNIT/ML 100 UNIT/ML
500 SOLUTION INTRAVENOUS PRN
Status: CANCELLED | OUTPATIENT
Start: 2025-03-20

## 2025-03-18 RX ORDER — SODIUM CHLORIDE 0.9 % (FLUSH) 0.9 %
5-40 SYRINGE (ML) INJECTION PRN
Status: CANCELLED | OUTPATIENT
Start: 2025-03-20

## 2025-03-18 RX ORDER — FAMOTIDINE 10 MG/ML
20 INJECTION, SOLUTION INTRAVENOUS
Status: CANCELLED | OUTPATIENT
Start: 2025-03-20

## 2025-03-20 ENCOUNTER — HOSPITAL ENCOUNTER (OUTPATIENT)
Dept: INFUSION THERAPY | Age: 85
Discharge: HOME | End: 2025-03-20
Payer: MEDICARE

## 2025-03-20 VITALS
TEMPERATURE: 97.4 F | WEIGHT: 151 LBS | RESPIRATION RATE: 16 BRPM | BODY MASS INDEX: 27.79 KG/M2 | DIASTOLIC BLOOD PRESSURE: 74 MMHG | HEIGHT: 62 IN | HEART RATE: 74 BPM | SYSTOLIC BLOOD PRESSURE: 167 MMHG

## 2025-03-20 DIAGNOSIS — C56.3 MALIGNANT NEOPLASM OF BOTH OVARIES (HCC): ICD-10-CM

## 2025-03-20 DIAGNOSIS — C76.2 ABDOMINAL CARCINOMATOSIS (HCC): Primary | ICD-10-CM

## 2025-03-20 LAB
ALBUMIN SERPL-MCNC: 3.7 G/DL (ref 3.5–5.2)
ALBUMIN/GLOB SERPL: 1.8 {RATIO} (ref 1–2.5)
ALP SERPL-CCNC: 63 U/L (ref 35–104)
ALT SERPL-CCNC: 10 U/L (ref 10–35)
ANION GAP SERPL CALCULATED.3IONS-SCNC: 7 MMOL/L (ref 9–16)
AST SERPL-CCNC: 16 U/L (ref 10–35)
BACTERIA URNS QL MICRO: ABNORMAL
BACTERIA URNS QL MICRO: ABNORMAL
BASOPHILS # BLD: 0.04 K/UL (ref 0–0.2)
BASOPHILS NFR BLD: 1 % (ref 0–2)
BILIRUB SERPL-MCNC: 0.3 MG/DL (ref 0–1.2)
BILIRUB UR QL STRIP: NEGATIVE
BILIRUB UR QL STRIP: NEGATIVE
BUN SERPL-MCNC: 17 MG/DL (ref 8–23)
BUN/CREAT SERPL: 24 (ref 9–20)
CALCIUM SERPL-MCNC: 8.8 MG/DL (ref 8.6–10.4)
CANCER AG125 SERPL-ACNC: 9 U/ML (ref 0–38)
CASTS #/AREA URNS LPF: ABNORMAL /LPF
CASTS #/AREA URNS LPF: ABNORMAL /LPF
CHLORIDE SERPL-SCNC: 107 MMOL/L (ref 98–107)
CLARITY UR: ABNORMAL
CLARITY UR: ABNORMAL
CO2 SERPL-SCNC: 27 MMOL/L (ref 20–31)
COLOR UR: YELLOW
COLOR UR: YELLOW
CREAT SERPL-MCNC: 0.7 MG/DL (ref 0.5–0.9)
EOSINOPHIL # BLD: 0.1 K/UL (ref 0–0.44)
EOSINOPHILS RELATIVE PERCENT: 2 % (ref 1–4)
EPI CELLS #/AREA URNS HPF: ABNORMAL /HPF (ref 0–25)
EPI CELLS #/AREA URNS HPF: ABNORMAL /HPF (ref 0–25)
ERYTHROCYTE [DISTWIDTH] IN BLOOD BY AUTOMATED COUNT: 14.5 % (ref 11.8–14.4)
GFR, ESTIMATED: 83 ML/MIN/1.73M2
GLUCOSE SERPL-MCNC: 117 MG/DL (ref 74–99)
GLUCOSE UR STRIP-MCNC: NEGATIVE MG/DL
GLUCOSE UR STRIP-MCNC: NEGATIVE MG/DL
HCT VFR BLD AUTO: 31.7 % (ref 36.3–47.1)
HGB BLD-MCNC: 10.7 G/DL (ref 11.9–15.1)
HGB UR QL STRIP.AUTO: ABNORMAL
HGB UR QL STRIP.AUTO: NEGATIVE
IMM GRANULOCYTES # BLD AUTO: <0.03 K/UL (ref 0–0.3)
IMM GRANULOCYTES NFR BLD: 0 %
KETONES UR STRIP-MCNC: NEGATIVE MG/DL
KETONES UR STRIP-MCNC: NEGATIVE MG/DL
LEUKOCYTE ESTERASE UR QL STRIP: ABNORMAL
LEUKOCYTE ESTERASE UR QL STRIP: ABNORMAL
LYMPHOCYTES NFR BLD: 0.71 K/UL (ref 1.1–3.7)
LYMPHOCYTES RELATIVE PERCENT: 14 % (ref 24–43)
MCH RBC QN AUTO: 29.6 PG (ref 25.2–33.5)
MCHC RBC AUTO-ENTMCNC: 33.8 G/DL (ref 28.4–34.8)
MCV RBC AUTO: 87.8 FL (ref 82.6–102.9)
MONOCYTES NFR BLD: 0.46 K/UL (ref 0.1–1.2)
MONOCYTES NFR BLD: 9 % (ref 3–12)
MUCOUS THREADS URNS QL MICRO: ABNORMAL
MUCOUS THREADS URNS QL MICRO: ABNORMAL
NEUTROPHILS NFR BLD: 74 % (ref 36–65)
NEUTS SEG NFR BLD: 3.64 K/UL (ref 1.5–8.1)
NITRITE UR QL STRIP: NEGATIVE
NITRITE UR QL STRIP: NEGATIVE
NRBC BLD-RTO: 0 PER 100 WBC
PH UR STRIP: 6 [PH] (ref 5–9)
PH UR STRIP: 7 [PH] (ref 5–9)
PLATELET # BLD AUTO: 172 K/UL (ref 138–453)
PMV BLD AUTO: 9.2 FL (ref 8.1–13.5)
POTASSIUM SERPL-SCNC: 4.1 MMOL/L (ref 3.7–5.3)
PROT SERPL-MCNC: 5.8 G/DL (ref 6.6–8.7)
PROT UR STRIP-MCNC: ABNORMAL MG/DL
PROT UR STRIP-MCNC: ABNORMAL MG/DL
RBC # BLD AUTO: 3.61 M/UL (ref 3.95–5.11)
RBC #/AREA URNS HPF: ABNORMAL /HPF (ref 0–2)
RBC #/AREA URNS HPF: ABNORMAL /HPF (ref 0–2)
RENAL EPITHELIAL, UA: ABNORMAL /HPF
SODIUM SERPL-SCNC: 141 MMOL/L (ref 136–145)
SP GR UR STRIP: 1.02 (ref 1.01–1.02)
SP GR UR STRIP: 1.02 (ref 1.01–1.02)
UROBILINOGEN UR STRIP-ACNC: NORMAL EU/DL (ref 0–1)
UROBILINOGEN UR STRIP-ACNC: NORMAL EU/DL (ref 0–1)
WBC #/AREA URNS HPF: ABNORMAL /HPF (ref 0–5)
WBC #/AREA URNS HPF: ABNORMAL /HPF (ref 0–5)
WBC OTHER # BLD: 5 K/UL (ref 3.5–11.3)

## 2025-03-20 PROCEDURE — 6360000002 HC RX W HCPCS: Performed by: INTERNAL MEDICINE

## 2025-03-20 PROCEDURE — 80053 COMPREHEN METABOLIC PANEL: CPT

## 2025-03-20 PROCEDURE — 85025 COMPLETE CBC W/AUTO DIFF WBC: CPT

## 2025-03-20 PROCEDURE — 2500000003 HC RX 250 WO HCPCS: Performed by: INTERNAL MEDICINE

## 2025-03-20 PROCEDURE — 81001 URINALYSIS AUTO W/SCOPE: CPT

## 2025-03-20 PROCEDURE — 36591 DRAW BLOOD OFF VENOUS DEVICE: CPT

## 2025-03-20 PROCEDURE — 86304 IMMUNOASSAY TUMOR CA 125: CPT

## 2025-03-20 RX ORDER — EPINEPHRINE 1 MG/ML
0.3 INJECTION, SOLUTION, CONCENTRATE INTRAVENOUS PRN
OUTPATIENT
Start: 2025-03-20

## 2025-03-20 RX ORDER — SODIUM CHLORIDE 9 MG/ML
5-250 INJECTION, SOLUTION INTRAVENOUS PRN
Status: DISCONTINUED | OUTPATIENT
Start: 2025-03-20 | End: 2025-03-20

## 2025-03-20 RX ORDER — SODIUM CHLORIDE 9 MG/ML
25 INJECTION, SOLUTION INTRAVENOUS PRN
OUTPATIENT
Start: 2025-03-20

## 2025-03-20 RX ORDER — HEPARIN 100 UNIT/ML
500 SYRINGE INTRAVENOUS PRN
Status: DISCONTINUED | OUTPATIENT
Start: 2025-03-20 | End: 2025-03-20

## 2025-03-20 RX ORDER — HEPARIN 100 UNIT/ML
500 SYRINGE INTRAVENOUS PRN
OUTPATIENT
Start: 2025-03-20

## 2025-03-20 RX ORDER — SODIUM CHLORIDE 0.9 % (FLUSH) 0.9 %
5-40 SYRINGE (ML) INJECTION PRN
OUTPATIENT
Start: 2025-03-20

## 2025-03-20 RX ORDER — SODIUM CHLORIDE 0.9 % (FLUSH) 0.9 %
5-40 SYRINGE (ML) INJECTION PRN
Status: DISCONTINUED | OUTPATIENT
Start: 2025-03-20 | End: 2025-03-20

## 2025-03-20 RX ORDER — SODIUM CHLORIDE 9 MG/ML
INJECTION, SOLUTION INTRAVENOUS CONTINUOUS
OUTPATIENT
Start: 2025-03-20

## 2025-03-20 RX ORDER — ONDANSETRON 2 MG/ML
8 INJECTION INTRAMUSCULAR; INTRAVENOUS
OUTPATIENT
Start: 2025-03-20

## 2025-03-20 RX ORDER — FAMOTIDINE 10 MG/ML
20 INJECTION, SOLUTION INTRAVENOUS
OUTPATIENT
Start: 2025-03-20

## 2025-03-20 RX ORDER — HYDROCORTISONE SODIUM SUCCINATE 100 MG/2ML
100 INJECTION INTRAMUSCULAR; INTRAVENOUS
OUTPATIENT
Start: 2025-03-20

## 2025-03-20 RX ORDER — ACETAMINOPHEN 325 MG/1
650 TABLET ORAL
OUTPATIENT
Start: 2025-03-20

## 2025-03-20 RX ORDER — DIPHENHYDRAMINE HYDROCHLORIDE 50 MG/ML
50 INJECTION, SOLUTION INTRAMUSCULAR; INTRAVENOUS
OUTPATIENT
Start: 2025-03-20

## 2025-03-20 RX ORDER — ALBUTEROL SULFATE 90 UG/1
4 INHALANT RESPIRATORY (INHALATION) PRN
OUTPATIENT
Start: 2025-03-20

## 2025-03-20 RX ADMIN — HEPARIN 500 UNITS: 100 SYRINGE at 12:52

## 2025-03-20 RX ADMIN — SODIUM CHLORIDE, PRESERVATIVE FREE 10 ML: 5 INJECTION INTRAVENOUS at 12:51

## 2025-03-20 RX ADMIN — SODIUM CHLORIDE, PRESERVATIVE FREE 10 ML: 5 INJECTION INTRAVENOUS at 10:58

## 2025-03-20 RX ADMIN — SODIUM CHLORIDE, PRESERVATIVE FREE 10 ML: 5 INJECTION INTRAVENOUS at 10:59

## 2025-03-20 RX ADMIN — SODIUM CHLORIDE, PRESERVATIVE FREE 10 ML: 5 INJECTION INTRAVENOUS at 12:52

## 2025-03-20 NOTE — PROGRESS NOTES
Dr. Whitehead messaged that pt was held last week due to elevated blood pressure was started her on Norvasc 5 mg. She has taken that, but she quit taking the HCTZ, she did not know she was supposed to be taking them both. Blood pressures today were on admission 165/64 rechecked 45 minutes later 167/74. The urine on the UA today is 4+ plan says to notify physician if systolic blood pressure above 160 and urine protein above 2+. Orders received to hold patient today and reattempt treatment in 2 weeks. Explained plan of care to patient. She verbalized understanding but was tearful.

## 2025-04-03 ENCOUNTER — HOSPITAL ENCOUNTER (OUTPATIENT)
Dept: INFUSION THERAPY | Age: 85
Discharge: HOME | End: 2025-04-03
Payer: MEDICARE

## 2025-04-03 VITALS
TEMPERATURE: 98 F | WEIGHT: 148 LBS | RESPIRATION RATE: 18 BRPM | DIASTOLIC BLOOD PRESSURE: 71 MMHG | BODY MASS INDEX: 27.23 KG/M2 | SYSTOLIC BLOOD PRESSURE: 160 MMHG | HEART RATE: 65 BPM | HEIGHT: 62 IN

## 2025-04-03 DIAGNOSIS — C76.2 ABDOMINAL CARCINOMATOSIS (HCC): Primary | ICD-10-CM

## 2025-04-03 DIAGNOSIS — C56.3 MALIGNANT NEOPLASM OF BOTH OVARIES (HCC): Primary | ICD-10-CM

## 2025-04-03 DIAGNOSIS — C56.3 MALIGNANT NEOPLASM OF BOTH OVARIES (HCC): ICD-10-CM

## 2025-04-03 LAB
ALBUMIN SERPL-MCNC: 4 G/DL (ref 3.5–5.2)
ALBUMIN/GLOB SERPL: 2.1 {RATIO} (ref 1–2.5)
ALP SERPL-CCNC: 64 U/L (ref 35–104)
ALT SERPL-CCNC: 11 U/L (ref 10–35)
ANION GAP SERPL CALCULATED.3IONS-SCNC: 8 MMOL/L (ref 9–16)
AST SERPL-CCNC: 16 U/L (ref 10–35)
BACTERIA URNS QL MICRO: ABNORMAL
BASOPHILS # BLD: 0.04 K/UL (ref 0–0.2)
BASOPHILS NFR BLD: 1 % (ref 0–2)
BILIRUB SERPL-MCNC: 0.3 MG/DL (ref 0–1.2)
BILIRUB UR QL STRIP: NEGATIVE
BUN SERPL-MCNC: 23 MG/DL (ref 8–23)
BUN/CREAT SERPL: 29 (ref 9–20)
CALCIUM SERPL-MCNC: 9 MG/DL (ref 8.6–10.4)
CANCER AG125 SERPL-ACNC: 8 U/ML (ref 0–38)
CHLORIDE SERPL-SCNC: 104 MMOL/L (ref 98–107)
CLARITY UR: CLEAR
CO2 SERPL-SCNC: 28 MMOL/L (ref 20–31)
COLOR UR: YELLOW
CREAT SERPL-MCNC: 0.8 MG/DL (ref 0.5–0.9)
EOSINOPHIL # BLD: 0.15 K/UL (ref 0–0.44)
EOSINOPHILS RELATIVE PERCENT: 3 % (ref 1–4)
EPI CELLS #/AREA URNS HPF: ABNORMAL /HPF (ref 0–25)
ERYTHROCYTE [DISTWIDTH] IN BLOOD BY AUTOMATED COUNT: 13.6 % (ref 11.8–14.4)
GFR, ESTIMATED: 73 ML/MIN/1.73M2
GLUCOSE SERPL-MCNC: 106 MG/DL (ref 74–99)
GLUCOSE UR STRIP-MCNC: NEGATIVE MG/DL
HCT VFR BLD AUTO: 33.5 % (ref 36.3–47.1)
HGB BLD-MCNC: 11.2 G/DL (ref 11.9–15.1)
HGB UR QL STRIP.AUTO: NEGATIVE
IMM GRANULOCYTES # BLD AUTO: <0.03 K/UL (ref 0–0.3)
IMM GRANULOCYTES NFR BLD: 0 %
KETONES UR STRIP-MCNC: NEGATIVE MG/DL
LEUKOCYTE ESTERASE UR QL STRIP: ABNORMAL
LYMPHOCYTES NFR BLD: 0.94 K/UL (ref 1.1–3.7)
LYMPHOCYTES RELATIVE PERCENT: 17 % (ref 24–43)
MCH RBC QN AUTO: 29.6 PG (ref 25.2–33.5)
MCHC RBC AUTO-ENTMCNC: 33.4 G/DL (ref 28.4–34.8)
MCV RBC AUTO: 88.4 FL (ref 82.6–102.9)
MONOCYTES NFR BLD: 0.52 K/UL (ref 0.1–1.2)
MONOCYTES NFR BLD: 9 % (ref 3–12)
MUCOUS THREADS URNS QL MICRO: ABNORMAL
NEUTROPHILS NFR BLD: 70 % (ref 36–65)
NEUTS SEG NFR BLD: 3.86 K/UL (ref 1.5–8.1)
NITRITE UR QL STRIP: NEGATIVE
NRBC BLD-RTO: 0 PER 100 WBC
PH UR STRIP: 6 [PH] (ref 5–9)
PLATELET # BLD AUTO: 191 K/UL (ref 138–453)
PMV BLD AUTO: 9.5 FL (ref 8.1–13.5)
POTASSIUM SERPL-SCNC: 4.1 MMOL/L (ref 3.7–5.3)
PROT SERPL-MCNC: 5.9 G/DL (ref 6.6–8.7)
PROT UR STRIP-MCNC: ABNORMAL MG/DL
RBC # BLD AUTO: 3.79 M/UL (ref 3.95–5.11)
RBC #/AREA URNS HPF: ABNORMAL /HPF (ref 0–2)
SODIUM SERPL-SCNC: 140 MMOL/L (ref 136–145)
SP GR UR STRIP: 1.01 (ref 1.01–1.02)
UROBILINOGEN UR STRIP-ACNC: NORMAL EU/DL (ref 0–1)
WBC #/AREA URNS HPF: ABNORMAL /HPF (ref 0–5)
WBC OTHER # BLD: 5.5 K/UL (ref 3.5–11.3)

## 2025-04-03 PROCEDURE — 6360000002 HC RX W HCPCS: Performed by: INTERNAL MEDICINE

## 2025-04-03 PROCEDURE — 86304 IMMUNOASSAY TUMOR CA 125: CPT

## 2025-04-03 PROCEDURE — 2580000003 HC RX 258: Performed by: INTERNAL MEDICINE

## 2025-04-03 PROCEDURE — 85025 COMPLETE CBC W/AUTO DIFF WBC: CPT

## 2025-04-03 PROCEDURE — 80053 COMPREHEN METABOLIC PANEL: CPT

## 2025-04-03 PROCEDURE — 96413 CHEMO IV INFUSION 1 HR: CPT

## 2025-04-03 PROCEDURE — 2500000003 HC RX 250 WO HCPCS: Performed by: INTERNAL MEDICINE

## 2025-04-03 PROCEDURE — 81001 URINALYSIS AUTO W/SCOPE: CPT

## 2025-04-03 RX ORDER — EPINEPHRINE 1 MG/ML
0.3 INJECTION, SOLUTION, CONCENTRATE INTRAVENOUS PRN
OUTPATIENT
Start: 2025-04-24

## 2025-04-03 RX ORDER — MEPERIDINE HYDROCHLORIDE 25 MG/ML
12.5 INJECTION INTRAMUSCULAR; INTRAVENOUS; SUBCUTANEOUS PRN
Status: CANCELLED | OUTPATIENT
Start: 2025-04-03

## 2025-04-03 RX ORDER — MEPERIDINE HYDROCHLORIDE 25 MG/ML
12.5 INJECTION INTRAMUSCULAR; INTRAVENOUS; SUBCUTANEOUS PRN
OUTPATIENT
Start: 2025-04-24

## 2025-04-03 RX ORDER — ALBUTEROL SULFATE 90 UG/1
4 INHALANT RESPIRATORY (INHALATION) PRN
Status: CANCELLED | OUTPATIENT
Start: 2025-04-03

## 2025-04-03 RX ORDER — HYDROCORTISONE SODIUM SUCCINATE 100 MG/2ML
100 INJECTION INTRAMUSCULAR; INTRAVENOUS
OUTPATIENT
Start: 2025-04-24

## 2025-04-03 RX ORDER — ALBUTEROL SULFATE 90 UG/1
4 INHALANT RESPIRATORY (INHALATION) PRN
OUTPATIENT
Start: 2025-04-24

## 2025-04-03 RX ORDER — ONDANSETRON 2 MG/ML
8 INJECTION INTRAMUSCULAR; INTRAVENOUS
Status: CANCELLED | OUTPATIENT
Start: 2025-04-03

## 2025-04-03 RX ORDER — SODIUM CHLORIDE 9 MG/ML
INJECTION, SOLUTION INTRAVENOUS CONTINUOUS
OUTPATIENT
Start: 2025-04-24

## 2025-04-03 RX ORDER — EPINEPHRINE 1 MG/ML
0.3 INJECTION, SOLUTION, CONCENTRATE INTRAVENOUS PRN
Status: CANCELLED | OUTPATIENT
Start: 2025-04-03

## 2025-04-03 RX ORDER — SODIUM CHLORIDE 0.9 % (FLUSH) 0.9 %
5-40 SYRINGE (ML) INJECTION PRN
OUTPATIENT
Start: 2025-04-24

## 2025-04-03 RX ORDER — HEPARIN 100 UNIT/ML
500 SYRINGE INTRAVENOUS PRN
OUTPATIENT
Start: 2025-04-24

## 2025-04-03 RX ORDER — FAMOTIDINE 10 MG/ML
20 INJECTION, SOLUTION INTRAVENOUS
Status: CANCELLED | OUTPATIENT
Start: 2025-04-03

## 2025-04-03 RX ORDER — SODIUM CHLORIDE 9 MG/ML
INJECTION, SOLUTION INTRAVENOUS CONTINUOUS
Status: CANCELLED | OUTPATIENT
Start: 2025-04-03

## 2025-04-03 RX ORDER — SODIUM CHLORIDE 9 MG/ML
5-250 INJECTION, SOLUTION INTRAVENOUS PRN
OUTPATIENT
Start: 2025-04-24

## 2025-04-03 RX ORDER — HYDROCORTISONE SODIUM SUCCINATE 100 MG/2ML
100 INJECTION INTRAMUSCULAR; INTRAVENOUS
Status: CANCELLED | OUTPATIENT
Start: 2025-04-03

## 2025-04-03 RX ORDER — DIPHENHYDRAMINE HYDROCHLORIDE 50 MG/ML
50 INJECTION, SOLUTION INTRAMUSCULAR; INTRAVENOUS
OUTPATIENT
Start: 2025-04-24

## 2025-04-03 RX ORDER — DIPHENHYDRAMINE HYDROCHLORIDE 50 MG/ML
50 INJECTION, SOLUTION INTRAMUSCULAR; INTRAVENOUS
Status: CANCELLED | OUTPATIENT
Start: 2025-04-03

## 2025-04-03 RX ORDER — ACETAMINOPHEN 325 MG/1
650 TABLET ORAL
Status: CANCELLED | OUTPATIENT
Start: 2025-04-03

## 2025-04-03 RX ORDER — ONDANSETRON 2 MG/ML
8 INJECTION INTRAMUSCULAR; INTRAVENOUS
OUTPATIENT
Start: 2025-04-24

## 2025-04-03 RX ORDER — SODIUM CHLORIDE 9 MG/ML
5-250 INJECTION, SOLUTION INTRAVENOUS PRN
Status: CANCELLED | OUTPATIENT
Start: 2025-04-03

## 2025-04-03 RX ORDER — HEPARIN 100 UNIT/ML
500 SYRINGE INTRAVENOUS PRN
Status: DISCONTINUED | OUTPATIENT
Start: 2025-04-03 | End: 2025-04-04 | Stop reason: HOSPADM

## 2025-04-03 RX ORDER — SODIUM CHLORIDE 0.9 % (FLUSH) 0.9 %
5-40 SYRINGE (ML) INJECTION PRN
Status: DISCONTINUED | OUTPATIENT
Start: 2025-04-03 | End: 2025-04-04 | Stop reason: HOSPADM

## 2025-04-03 RX ORDER — ACETAMINOPHEN 325 MG/1
650 TABLET ORAL
OUTPATIENT
Start: 2025-04-24

## 2025-04-03 RX ORDER — SODIUM CHLORIDE 9 MG/ML
5-250 INJECTION, SOLUTION INTRAVENOUS PRN
Status: DISCONTINUED | OUTPATIENT
Start: 2025-04-03 | End: 2025-04-04 | Stop reason: HOSPADM

## 2025-04-03 RX ORDER — FAMOTIDINE 10 MG/ML
20 INJECTION, SOLUTION INTRAVENOUS
OUTPATIENT
Start: 2025-04-24

## 2025-04-03 RX ADMIN — SODIUM CHLORIDE, PRESERVATIVE FREE 10 ML: 5 INJECTION INTRAVENOUS at 12:26

## 2025-04-03 RX ADMIN — SODIUM CHLORIDE, PRESERVATIVE FREE 10 ML: 5 INJECTION INTRAVENOUS at 12:25

## 2025-04-03 RX ADMIN — SODIUM CHLORIDE, PRESERVATIVE FREE 10 ML: 5 INJECTION INTRAVENOUS at 10:12

## 2025-04-03 RX ADMIN — SODIUM CHLORIDE 100 ML/HR: 0.9 INJECTION, SOLUTION INTRAVENOUS at 11:28

## 2025-04-03 RX ADMIN — SODIUM CHLORIDE 1000 MG: 9 INJECTION, SOLUTION INTRAVENOUS at 11:48

## 2025-04-03 RX ADMIN — SODIUM CHLORIDE, PRESERVATIVE FREE 10 ML: 5 INJECTION INTRAVENOUS at 10:11

## 2025-04-03 RX ADMIN — HEPARIN 500 UNITS: 100 SYRINGE at 12:26

## 2025-04-03 NOTE — PROGRESS NOTES
Follow up visit.  Engaged in conversation and open and receptive.  She voiced concerns about her blood pressure numbers.  Able to have treatment today.  A general prayer said with her at the end of the visit.  Continue to visit and to support.  Her  left the area for a while to walk.  See Flowsheet.    Sister Maru Raya, OSF/T  BCC

## 2025-04-09 ENCOUNTER — TELEPHONE (OUTPATIENT)
Dept: ONCOLOGY | Age: 85
End: 2025-04-09

## 2025-04-09 ENCOUNTER — OFFICE VISIT (OUTPATIENT)
Dept: ONCOLOGY | Age: 85
End: 2025-04-09
Payer: MEDICARE

## 2025-04-09 VITALS
WEIGHT: 150 LBS | RESPIRATION RATE: 18 BRPM | BODY MASS INDEX: 27.6 KG/M2 | DIASTOLIC BLOOD PRESSURE: 85 MMHG | TEMPERATURE: 98.2 F | HEART RATE: 77 BPM | SYSTOLIC BLOOD PRESSURE: 164 MMHG | HEIGHT: 62 IN

## 2025-04-09 DIAGNOSIS — C76.2 ABDOMINAL CARCINOMATOSIS (HCC): ICD-10-CM

## 2025-04-09 DIAGNOSIS — C56.3 MALIGNANT NEOPLASM OF BOTH OVARIES (HCC): Primary | ICD-10-CM

## 2025-04-09 PROCEDURE — 1090F PRES/ABSN URINE INCON ASSESS: CPT | Performed by: INTERNAL MEDICINE

## 2025-04-09 PROCEDURE — 99214 OFFICE O/P EST MOD 30 MIN: CPT | Performed by: INTERNAL MEDICINE

## 2025-04-09 PROCEDURE — G8400 PT W/DXA NO RESULTS DOC: HCPCS | Performed by: INTERNAL MEDICINE

## 2025-04-09 PROCEDURE — G8417 CALC BMI ABV UP PARAM F/U: HCPCS | Performed by: INTERNAL MEDICINE

## 2025-04-09 PROCEDURE — 99211 OFF/OP EST MAY X REQ PHY/QHP: CPT | Performed by: INTERNAL MEDICINE

## 2025-04-09 PROCEDURE — 1036F TOBACCO NON-USER: CPT | Performed by: INTERNAL MEDICINE

## 2025-04-09 PROCEDURE — G8427 DOCREV CUR MEDS BY ELIG CLIN: HCPCS | Performed by: INTERNAL MEDICINE

## 2025-04-09 PROCEDURE — 1159F MED LIST DOCD IN RCRD: CPT | Performed by: INTERNAL MEDICINE

## 2025-04-09 PROCEDURE — 1126F AMNT PAIN NOTED NONE PRSNT: CPT | Performed by: INTERNAL MEDICINE

## 2025-04-09 PROCEDURE — 1123F ACP DISCUSS/DSCN MKR DOCD: CPT | Performed by: INTERNAL MEDICINE

## 2025-04-09 RX ORDER — AMLODIPINE BESYLATE 5 MG/1
5 TABLET ORAL 2 TIMES DAILY
Qty: 60 TABLET | Refills: 2 | Status: SHIPPED | OUTPATIENT
Start: 2025-04-09

## 2025-04-09 NOTE — PATIENT INSTRUCTIONS
Continue maintenance bevacizumab as scheduled.  Make Norvasc twice daily  Need cbc, cmp  with each treatment   Rv to see us in 2-3 months.

## 2025-04-09 NOTE — TELEPHONE ENCOUNTER
Name: Ruth Hutton  : 1940  MRN: [id number]    Oncology Navigation Follow-Up Note    Contact Type:  Medical Oncology    Notes: Met with Ruth in clinic. Ruth states she is tolerating treatment well and continues to monitor her blood pressure. Denies current needs from navigation. Next treatment 25. Follow-up 6/15/25.      Electronically signed by Keerthi Kurtz RN on 2025 at 3:29 PM

## 2025-04-24 ENCOUNTER — HOSPITAL ENCOUNTER (OUTPATIENT)
Dept: INFUSION THERAPY | Age: 85
Discharge: HOME | End: 2025-04-24
Payer: MEDICARE

## 2025-04-24 VITALS
RESPIRATION RATE: 18 BRPM | BODY MASS INDEX: 27.97 KG/M2 | DIASTOLIC BLOOD PRESSURE: 81 MMHG | HEART RATE: 72 BPM | WEIGHT: 152 LBS | SYSTOLIC BLOOD PRESSURE: 150 MMHG | TEMPERATURE: 98.2 F | HEIGHT: 62 IN

## 2025-04-24 DIAGNOSIS — C56.3 MALIGNANT NEOPLASM OF BOTH OVARIES (HCC): ICD-10-CM

## 2025-04-24 DIAGNOSIS — C76.2 ABDOMINAL CARCINOMATOSIS (HCC): Primary | ICD-10-CM

## 2025-04-24 LAB
ALBUMIN SERPL-MCNC: 4 G/DL (ref 3.5–5.2)
ALBUMIN/GLOB SERPL: 1.9 {RATIO} (ref 1–2.5)
ALP SERPL-CCNC: 59 U/L (ref 35–104)
ALT SERPL-CCNC: 13 U/L (ref 10–35)
ANION GAP SERPL CALCULATED.3IONS-SCNC: 7 MMOL/L (ref 9–16)
AST SERPL-CCNC: 16 U/L (ref 10–35)
BACTERIA URNS QL MICRO: ABNORMAL
BASOPHILS # BLD: 0.05 K/UL (ref 0–0.2)
BASOPHILS NFR BLD: 1 % (ref 0–2)
BILIRUB SERPL-MCNC: <0.2 MG/DL (ref 0–1.2)
BILIRUB UR QL STRIP: NEGATIVE
BUN SERPL-MCNC: 22 MG/DL (ref 8–23)
BUN/CREAT SERPL: 28 (ref 9–20)
CALCIUM SERPL-MCNC: 9.1 MG/DL (ref 8.6–10.4)
CANCER AG125 SERPL-ACNC: 8 U/ML (ref 0–38)
CHLORIDE SERPL-SCNC: 106 MMOL/L (ref 98–107)
CLARITY UR: CLEAR
CO2 SERPL-SCNC: 29 MMOL/L (ref 20–31)
COLOR UR: YELLOW
CREAT SERPL-MCNC: 0.8 MG/DL (ref 0.5–0.9)
EOSINOPHIL # BLD: 0.17 K/UL (ref 0–0.44)
EOSINOPHILS RELATIVE PERCENT: 3 % (ref 1–4)
EPI CELLS #/AREA URNS HPF: ABNORMAL /HPF (ref 0–25)
ERYTHROCYTE [DISTWIDTH] IN BLOOD BY AUTOMATED COUNT: 12.7 % (ref 11.8–14.4)
GFR, ESTIMATED: 76 ML/MIN/1.73M2
GLUCOSE SERPL-MCNC: 111 MG/DL (ref 74–99)
GLUCOSE UR STRIP-MCNC: NEGATIVE MG/DL
HCT VFR BLD AUTO: 32.3 % (ref 36.3–47.1)
HGB BLD-MCNC: 10.8 G/DL (ref 11.9–15.1)
HGB UR QL STRIP.AUTO: NEGATIVE
IMM GRANULOCYTES # BLD AUTO: <0.03 K/UL (ref 0–0.3)
IMM GRANULOCYTES NFR BLD: 0 %
KETONES UR STRIP-MCNC: NEGATIVE MG/DL
LEUKOCYTE ESTERASE UR QL STRIP: ABNORMAL
LYMPHOCYTES NFR BLD: 0.89 K/UL (ref 1.1–3.7)
LYMPHOCYTES RELATIVE PERCENT: 17 % (ref 24–43)
MCH RBC QN AUTO: 29.8 PG (ref 25.2–33.5)
MCHC RBC AUTO-ENTMCNC: 33.4 G/DL (ref 28.4–34.8)
MCV RBC AUTO: 89 FL (ref 82.6–102.9)
MONOCYTES NFR BLD: 0.49 K/UL (ref 0.1–1.2)
MONOCYTES NFR BLD: 9 % (ref 3–12)
NEUTROPHILS NFR BLD: 70 % (ref 36–65)
NEUTS SEG NFR BLD: 3.69 K/UL (ref 1.5–8.1)
NITRITE UR QL STRIP: NEGATIVE
NRBC BLD-RTO: 0 PER 100 WBC
PH UR STRIP: 6 [PH] (ref 5–9)
PLATELET # BLD AUTO: 197 K/UL (ref 138–453)
PMV BLD AUTO: 9.4 FL (ref 8.1–13.5)
POTASSIUM SERPL-SCNC: 4.2 MMOL/L (ref 3.7–5.3)
PROT SERPL-MCNC: 6 G/DL (ref 6.6–8.7)
PROT UR STRIP-MCNC: ABNORMAL MG/DL
RBC # BLD AUTO: 3.63 M/UL (ref 3.95–5.11)
RBC #/AREA URNS HPF: ABNORMAL /HPF (ref 0–2)
SODIUM SERPL-SCNC: 142 MMOL/L (ref 136–145)
SP GR UR STRIP: 1.01 (ref 1.01–1.02)
UROBILINOGEN UR STRIP-ACNC: NORMAL EU/DL (ref 0–1)
WBC #/AREA URNS HPF: ABNORMAL /HPF (ref 0–5)
WBC OTHER # BLD: 5.3 K/UL (ref 3.5–11.3)

## 2025-04-24 PROCEDURE — 2500000003 HC RX 250 WO HCPCS: Performed by: INTERNAL MEDICINE

## 2025-04-24 PROCEDURE — 6360000002 HC RX W HCPCS: Performed by: INTERNAL MEDICINE

## 2025-04-24 PROCEDURE — 85025 COMPLETE CBC W/AUTO DIFF WBC: CPT

## 2025-04-24 PROCEDURE — 96413 CHEMO IV INFUSION 1 HR: CPT

## 2025-04-24 PROCEDURE — 81001 URINALYSIS AUTO W/SCOPE: CPT

## 2025-04-24 PROCEDURE — 86304 IMMUNOASSAY TUMOR CA 125: CPT

## 2025-04-24 PROCEDURE — 80053 COMPREHEN METABOLIC PANEL: CPT

## 2025-04-24 PROCEDURE — 2580000003 HC RX 258: Performed by: INTERNAL MEDICINE

## 2025-04-24 RX ORDER — HEPARIN 100 UNIT/ML
500 SYRINGE INTRAVENOUS PRN
Status: DISCONTINUED | OUTPATIENT
Start: 2025-04-24 | End: 2025-04-25 | Stop reason: HOSPADM

## 2025-04-24 RX ORDER — SODIUM CHLORIDE 0.9 % (FLUSH) 0.9 %
5-40 SYRINGE (ML) INJECTION PRN
Status: DISCONTINUED | OUTPATIENT
Start: 2025-04-24 | End: 2025-04-25 | Stop reason: HOSPADM

## 2025-04-24 RX ORDER — SODIUM CHLORIDE 9 MG/ML
5-250 INJECTION, SOLUTION INTRAVENOUS PRN
Status: DISCONTINUED | OUTPATIENT
Start: 2025-04-24 | End: 2025-04-25 | Stop reason: HOSPADM

## 2025-04-24 RX ADMIN — SODIUM CHLORIDE, PRESERVATIVE FREE 10 ML: 5 INJECTION INTRAVENOUS at 11:56

## 2025-04-24 RX ADMIN — SODIUM CHLORIDE 100 ML/HR: 0.9 INJECTION, SOLUTION INTRAVENOUS at 10:50

## 2025-04-24 RX ADMIN — SODIUM CHLORIDE, PRESERVATIVE FREE 10 ML: 5 INJECTION INTRAVENOUS at 10:12

## 2025-04-24 RX ADMIN — HEPARIN 500 UNITS: 100 SYRINGE at 11:57

## 2025-04-24 RX ADMIN — SODIUM CHLORIDE, PRESERVATIVE FREE 10 ML: 5 INJECTION INTRAVENOUS at 11:57

## 2025-04-24 RX ADMIN — SODIUM CHLORIDE, PRESERVATIVE FREE 10 ML: 5 INJECTION INTRAVENOUS at 10:13

## 2025-04-24 RX ADMIN — SODIUM CHLORIDE 1000 MG: 9 INJECTION, SOLUTION INTRAVENOUS at 11:21

## 2025-04-24 NOTE — PROGRESS NOTES
Follow up visit with engaging in conversation; open and receptive.  She has family support.  Prayers said with both of them.  Continue to visit and to support.  See Flowsheet.  An assurance of prayers given.    Sister Maru Raya, OSF/T  BCC

## 2025-05-07 DIAGNOSIS — C76.2 ABDOMINAL CARCINOMATOSIS (HCC): Primary | ICD-10-CM

## 2025-05-07 DIAGNOSIS — C56.3 MALIGNANT NEOPLASM OF BOTH OVARIES (HCC): ICD-10-CM

## 2025-05-07 RX ORDER — HYDROCORTISONE SODIUM SUCCINATE 100 MG/2ML
100 INJECTION INTRAMUSCULAR; INTRAVENOUS
OUTPATIENT
Start: 2025-05-15

## 2025-05-07 RX ORDER — SODIUM CHLORIDE 9 MG/ML
5-250 INJECTION, SOLUTION INTRAVENOUS PRN
OUTPATIENT
Start: 2025-05-15

## 2025-05-07 RX ORDER — MEPERIDINE HYDROCHLORIDE 50 MG/ML
12.5 INJECTION INTRAMUSCULAR; INTRAVENOUS; SUBCUTANEOUS PRN
OUTPATIENT
Start: 2025-05-15

## 2025-05-07 RX ORDER — ONDANSETRON 2 MG/ML
8 INJECTION INTRAMUSCULAR; INTRAVENOUS
OUTPATIENT
Start: 2025-05-15

## 2025-05-07 RX ORDER — HEPARIN SODIUM (PORCINE) LOCK FLUSH IV SOLN 100 UNIT/ML 100 UNIT/ML
500 SOLUTION INTRAVENOUS PRN
OUTPATIENT
Start: 2025-05-15

## 2025-05-07 RX ORDER — ALBUTEROL SULFATE 90 UG/1
4 INHALANT RESPIRATORY (INHALATION) PRN
OUTPATIENT
Start: 2025-05-15

## 2025-05-07 RX ORDER — SODIUM CHLORIDE 9 MG/ML
INJECTION, SOLUTION INTRAVENOUS CONTINUOUS
OUTPATIENT
Start: 2025-05-15

## 2025-05-07 RX ORDER — ACETAMINOPHEN 325 MG/1
650 TABLET ORAL
OUTPATIENT
Start: 2025-05-15

## 2025-05-07 RX ORDER — EPINEPHRINE 1 MG/ML
0.3 INJECTION, SOLUTION, CONCENTRATE INTRAVENOUS PRN
OUTPATIENT
Start: 2025-05-15

## 2025-05-07 RX ORDER — FAMOTIDINE 10 MG/ML
20 INJECTION, SOLUTION INTRAVENOUS
OUTPATIENT
Start: 2025-05-15

## 2025-05-07 RX ORDER — DIPHENHYDRAMINE HYDROCHLORIDE 50 MG/ML
50 INJECTION, SOLUTION INTRAMUSCULAR; INTRAVENOUS
OUTPATIENT
Start: 2025-05-15

## 2025-05-07 RX ORDER — SODIUM CHLORIDE 0.9 % (FLUSH) 0.9 %
5-40 SYRINGE (ML) INJECTION PRN
OUTPATIENT
Start: 2025-05-15

## 2025-05-15 ENCOUNTER — HOSPITAL ENCOUNTER (OUTPATIENT)
Dept: INFUSION THERAPY | Age: 85
Discharge: HOME | End: 2025-05-15
Payer: MEDICARE

## 2025-05-15 VITALS
BODY MASS INDEX: 29.08 KG/M2 | HEIGHT: 62 IN | DIASTOLIC BLOOD PRESSURE: 58 MMHG | HEART RATE: 71 BPM | WEIGHT: 158 LBS | RESPIRATION RATE: 16 BRPM | SYSTOLIC BLOOD PRESSURE: 151 MMHG | TEMPERATURE: 97.5 F

## 2025-05-15 DIAGNOSIS — C76.2 ABDOMINAL CARCINOMATOSIS (HCC): ICD-10-CM

## 2025-05-15 DIAGNOSIS — C56.3 MALIGNANT NEOPLASM OF BOTH OVARIES (HCC): Primary | ICD-10-CM

## 2025-05-15 LAB
ALBUMIN SERPL-MCNC: 3.8 G/DL (ref 3.5–5.2)
ALBUMIN/GLOB SERPL: 1.9 {RATIO} (ref 1–2.5)
ALP SERPL-CCNC: 60 U/L (ref 35–104)
ALT SERPL-CCNC: 14 U/L (ref 10–35)
ANION GAP SERPL CALCULATED.3IONS-SCNC: 9 MMOL/L (ref 9–16)
AST SERPL-CCNC: 23 U/L (ref 10–35)
BACTERIA URNS QL MICRO: ABNORMAL
BASOPHILS # BLD: 0.04 K/UL (ref 0–0.2)
BASOPHILS NFR BLD: 1 % (ref 0–2)
BILIRUB SERPL-MCNC: <0.2 MG/DL (ref 0–1.2)
BILIRUB UR QL STRIP: NEGATIVE
BUN SERPL-MCNC: 25 MG/DL (ref 8–23)
BUN/CREAT SERPL: 28 (ref 9–20)
CALCIUM SERPL-MCNC: 9 MG/DL (ref 8.6–10.4)
CHLORIDE SERPL-SCNC: 104 MMOL/L (ref 98–107)
CLARITY UR: CLEAR
CO2 SERPL-SCNC: 29 MMOL/L (ref 20–31)
COLOR UR: YELLOW
CREAT SERPL-MCNC: 0.9 MG/DL (ref 0.5–0.9)
EOSINOPHIL # BLD: 0.23 K/UL (ref 0–0.44)
EOSINOPHILS RELATIVE PERCENT: 4 % (ref 1–4)
EPI CELLS #/AREA URNS HPF: ABNORMAL /HPF (ref 0–25)
ERYTHROCYTE [DISTWIDTH] IN BLOOD BY AUTOMATED COUNT: 13 % (ref 11.8–14.4)
GFR, ESTIMATED: 60 ML/MIN/1.73M2
GLUCOSE SERPL-MCNC: 114 MG/DL (ref 74–99)
GLUCOSE UR STRIP-MCNC: NEGATIVE MG/DL
HCT VFR BLD AUTO: 30.3 % (ref 36.3–47.1)
HGB BLD-MCNC: 10.4 G/DL (ref 11.9–15.1)
HGB UR QL STRIP.AUTO: NEGATIVE
IMM GRANULOCYTES # BLD AUTO: <0.03 K/UL (ref 0–0.3)
IMM GRANULOCYTES NFR BLD: 0 %
KETONES UR STRIP-MCNC: NEGATIVE MG/DL
LEUKOCYTE ESTERASE UR QL STRIP: NEGATIVE
LYMPHOCYTES NFR BLD: 0.85 K/UL (ref 1.1–3.7)
LYMPHOCYTES RELATIVE PERCENT: 14 % (ref 24–43)
MCH RBC QN AUTO: 29.8 PG (ref 25.2–33.5)
MCHC RBC AUTO-ENTMCNC: 34.3 G/DL (ref 28.4–34.8)
MCV RBC AUTO: 86.8 FL (ref 82.6–102.9)
MONOCYTES NFR BLD: 0.6 K/UL (ref 0.1–1.2)
MONOCYTES NFR BLD: 10 % (ref 3–12)
NEUTROPHILS NFR BLD: 71 % (ref 36–65)
NEUTS SEG NFR BLD: 4.53 K/UL (ref 1.5–8.1)
NITRITE UR QL STRIP: NEGATIVE
NRBC BLD-RTO: 0 PER 100 WBC
PH UR STRIP: 7 [PH] (ref 5–9)
PLATELET # BLD AUTO: 182 K/UL (ref 138–453)
PMV BLD AUTO: 9.1 FL (ref 8.1–13.5)
POTASSIUM SERPL-SCNC: 4.1 MMOL/L (ref 3.7–5.3)
PROT SERPL-MCNC: 5.8 G/DL (ref 6.6–8.7)
PROT UR STRIP-MCNC: ABNORMAL MG/DL
RBC # BLD AUTO: 3.49 M/UL (ref 3.95–5.11)
RBC #/AREA URNS HPF: ABNORMAL /HPF (ref 0–2)
SODIUM SERPL-SCNC: 142 MMOL/L (ref 136–145)
SP GR UR STRIP: 1.01 (ref 1.01–1.02)
UROBILINOGEN UR STRIP-ACNC: NORMAL EU/DL (ref 0–1)
WBC #/AREA URNS HPF: ABNORMAL /HPF (ref 0–5)
WBC OTHER # BLD: 6.3 K/UL (ref 3.5–11.3)

## 2025-05-15 PROCEDURE — 80053 COMPREHEN METABOLIC PANEL: CPT

## 2025-05-15 PROCEDURE — 2500000003 HC RX 250 WO HCPCS: Performed by: INTERNAL MEDICINE

## 2025-05-15 PROCEDURE — 85025 COMPLETE CBC W/AUTO DIFF WBC: CPT

## 2025-05-15 PROCEDURE — 6360000002 HC RX W HCPCS: Performed by: INTERNAL MEDICINE

## 2025-05-15 PROCEDURE — 96413 CHEMO IV INFUSION 1 HR: CPT

## 2025-05-15 PROCEDURE — 2580000003 HC RX 258: Performed by: INTERNAL MEDICINE

## 2025-05-15 PROCEDURE — 81001 URINALYSIS AUTO W/SCOPE: CPT

## 2025-05-15 RX ORDER — SODIUM CHLORIDE 0.9 % (FLUSH) 0.9 %
5-40 SYRINGE (ML) INJECTION PRN
Status: DISCONTINUED | OUTPATIENT
Start: 2025-05-15 | End: 2025-05-16 | Stop reason: HOSPADM

## 2025-05-15 RX ORDER — HEPARIN 100 UNIT/ML
500 SYRINGE INTRAVENOUS PRN
Status: DISCONTINUED | OUTPATIENT
Start: 2025-05-15 | End: 2025-05-16 | Stop reason: HOSPADM

## 2025-05-15 RX ORDER — AMLODIPINE BESYLATE 10 MG/1
10 TABLET ORAL DAILY
Qty: 30 TABLET | Refills: 1 | Status: SHIPPED | OUTPATIENT
Start: 2025-05-15

## 2025-05-15 RX ORDER — SODIUM CHLORIDE 9 MG/ML
5-250 INJECTION, SOLUTION INTRAVENOUS PRN
Status: DISCONTINUED | OUTPATIENT
Start: 2025-05-15 | End: 2025-05-16 | Stop reason: HOSPADM

## 2025-05-15 RX ADMIN — SODIUM CHLORIDE 100 ML/HR: 0.9 INJECTION, SOLUTION INTRAVENOUS at 10:40

## 2025-05-15 RX ADMIN — SODIUM CHLORIDE 1000 MG: 9 INJECTION, SOLUTION INTRAVENOUS at 11:10

## 2025-05-15 RX ADMIN — HEPARIN 500 UNITS: 100 SYRINGE at 11:43

## 2025-05-15 RX ADMIN — SODIUM CHLORIDE, PRESERVATIVE FREE 10 ML: 5 INJECTION INTRAVENOUS at 11:43

## 2025-05-15 RX ADMIN — SODIUM CHLORIDE, PRESERVATIVE FREE 10 ML: 5 INJECTION INTRAVENOUS at 11:42

## 2025-05-15 RX ADMIN — SODIUM CHLORIDE, PRESERVATIVE FREE 10 ML: 5 INJECTION INTRAVENOUS at 10:05

## 2025-05-15 RX ADMIN — SODIUM CHLORIDE, PRESERVATIVE FREE 10 ML: 5 INJECTION INTRAVENOUS at 10:04

## 2025-05-15 NOTE — PROGRESS NOTES
Follow up  visit with Ruth very engaging in conversation and sharing.   with her but difficulty hearing as his hearing aids need to be cleaned tomorrow.  Prayers said with both of them at the end of the visit.  See Flowsheet.  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  She voiced concerns about her blood pressure.  An assurance of prayers given.    Sister Maru Raya, OSF/T  BCC

## 2025-05-21 ENCOUNTER — TELEPHONE (OUTPATIENT)
Dept: FAMILY MEDICINE CLINIC | Age: 85
End: 2025-05-21

## 2025-05-21 NOTE — TELEPHONE ENCOUNTER
Patient has vomiting and diarrhea that started yesterday.    Patient said her  was dx yesterday with stomach flu? He was prescribed imodium and probiotic    She is asking if she can take imodium and probiotic like her   Please advise

## 2025-05-28 DIAGNOSIS — C56.3 MALIGNANT NEOPLASM OF BOTH OVARIES (HCC): ICD-10-CM

## 2025-05-28 DIAGNOSIS — C76.2 ABDOMINAL CARCINOMATOSIS (HCC): Primary | ICD-10-CM

## 2025-05-28 RX ORDER — ONDANSETRON 2 MG/ML
8 INJECTION INTRAMUSCULAR; INTRAVENOUS
OUTPATIENT
Start: 2025-06-05

## 2025-05-28 RX ORDER — HYDROCORTISONE SODIUM SUCCINATE 100 MG/2ML
100 INJECTION INTRAMUSCULAR; INTRAVENOUS
OUTPATIENT
Start: 2025-06-05

## 2025-05-28 RX ORDER — ACETAMINOPHEN 325 MG/1
650 TABLET ORAL
OUTPATIENT
Start: 2025-06-05

## 2025-05-28 RX ORDER — SODIUM CHLORIDE 9 MG/ML
INJECTION, SOLUTION INTRAVENOUS CONTINUOUS
OUTPATIENT
Start: 2025-06-05

## 2025-05-28 RX ORDER — ALBUTEROL SULFATE 90 UG/1
4 INHALANT RESPIRATORY (INHALATION) PRN
OUTPATIENT
Start: 2025-06-05

## 2025-05-28 RX ORDER — SODIUM CHLORIDE 0.9 % (FLUSH) 0.9 %
5-40 SYRINGE (ML) INJECTION PRN
OUTPATIENT
Start: 2025-06-05

## 2025-05-28 RX ORDER — SODIUM CHLORIDE 9 MG/ML
5-250 INJECTION, SOLUTION INTRAVENOUS PRN
OUTPATIENT
Start: 2025-06-05

## 2025-05-28 RX ORDER — FAMOTIDINE 10 MG/ML
20 INJECTION, SOLUTION INTRAVENOUS
OUTPATIENT
Start: 2025-06-05

## 2025-05-28 RX ORDER — DIPHENHYDRAMINE HYDROCHLORIDE 50 MG/ML
50 INJECTION, SOLUTION INTRAMUSCULAR; INTRAVENOUS
OUTPATIENT
Start: 2025-06-05

## 2025-05-28 RX ORDER — EPINEPHRINE 1 MG/ML
0.3 INJECTION, SOLUTION, CONCENTRATE INTRAVENOUS PRN
OUTPATIENT
Start: 2025-06-05

## 2025-05-28 RX ORDER — HEPARIN SODIUM (PORCINE) LOCK FLUSH IV SOLN 100 UNIT/ML 100 UNIT/ML
500 SOLUTION INTRAVENOUS PRN
OUTPATIENT
Start: 2025-06-05

## 2025-05-28 RX ORDER — MEPERIDINE HYDROCHLORIDE 50 MG/ML
12.5 INJECTION INTRAMUSCULAR; INTRAVENOUS; SUBCUTANEOUS PRN
OUTPATIENT
Start: 2025-06-05

## 2025-06-03 RX ORDER — APIXABAN 2.5 MG/1
2.5 TABLET, FILM COATED ORAL 2 TIMES DAILY
Qty: 60 TABLET | Refills: 0 | OUTPATIENT
Start: 2025-06-03

## 2025-06-05 ENCOUNTER — HOSPITAL ENCOUNTER (OUTPATIENT)
Dept: INFUSION THERAPY | Age: 85
Discharge: HOME | End: 2025-06-05
Payer: MEDICARE

## 2025-06-05 VITALS
HEIGHT: 62 IN | RESPIRATION RATE: 16 BRPM | TEMPERATURE: 97.4 F | DIASTOLIC BLOOD PRESSURE: 71 MMHG | WEIGHT: 152 LBS | HEART RATE: 74 BPM | BODY MASS INDEX: 27.97 KG/M2 | SYSTOLIC BLOOD PRESSURE: 157 MMHG

## 2025-06-05 DIAGNOSIS — C56.3 MALIGNANT NEOPLASM OF BOTH OVARIES (HCC): Primary | ICD-10-CM

## 2025-06-05 DIAGNOSIS — C76.2 ABDOMINAL CARCINOMATOSIS (HCC): ICD-10-CM

## 2025-06-05 LAB
ALBUMIN SERPL-MCNC: 3.8 G/DL (ref 3.5–5.2)
ALBUMIN/GLOB SERPL: 1.7 {RATIO} (ref 1–2.5)
ALP SERPL-CCNC: 59 U/L (ref 35–104)
ALT SERPL-CCNC: 12 U/L (ref 10–35)
ANION GAP SERPL CALCULATED.3IONS-SCNC: 9 MMOL/L (ref 9–16)
AST SERPL-CCNC: 18 U/L (ref 10–35)
BACTERIA URNS QL MICRO: ABNORMAL
BASOPHILS # BLD: 0.04 K/UL (ref 0–0.2)
BASOPHILS NFR BLD: 1 % (ref 0–2)
BILIRUB SERPL-MCNC: 0.3 MG/DL (ref 0–1.2)
BILIRUB UR QL STRIP: NEGATIVE
BUN SERPL-MCNC: 20 MG/DL (ref 8–23)
BUN/CREAT SERPL: 25 (ref 9–20)
CALCIUM SERPL-MCNC: 8.9 MG/DL (ref 8.6–10.4)
CANCER AG125 SERPL-ACNC: 8 U/ML (ref 0–38)
CHLORIDE SERPL-SCNC: 104 MMOL/L (ref 98–107)
CLARITY UR: CLEAR
CO2 SERPL-SCNC: 27 MMOL/L (ref 20–31)
COLOR UR: YELLOW
CREAT SERPL-MCNC: 0.8 MG/DL (ref 0.5–0.9)
EOSINOPHIL # BLD: 0.15 K/UL (ref 0–0.44)
EOSINOPHILS RELATIVE PERCENT: 3 % (ref 1–4)
EPI CELLS #/AREA URNS HPF: ABNORMAL /HPF (ref 0–25)
ERYTHROCYTE [DISTWIDTH] IN BLOOD BY AUTOMATED COUNT: 12.7 % (ref 11.8–14.4)
GFR, ESTIMATED: 76 ML/MIN/1.73M2
GLUCOSE SERPL-MCNC: 97 MG/DL (ref 74–99)
GLUCOSE UR STRIP-MCNC: NEGATIVE MG/DL
HCT VFR BLD AUTO: 32.1 % (ref 36.3–47.1)
HGB BLD-MCNC: 10.9 G/DL (ref 11.9–15.1)
HGB UR QL STRIP.AUTO: NEGATIVE
IMM GRANULOCYTES # BLD AUTO: <0.03 K/UL (ref 0–0.3)
IMM GRANULOCYTES NFR BLD: 0 %
KETONES UR STRIP-MCNC: NEGATIVE MG/DL
LEUKOCYTE ESTERASE UR QL STRIP: NEGATIVE
LYMPHOCYTES NFR BLD: 0.79 K/UL (ref 1.1–3.7)
LYMPHOCYTES RELATIVE PERCENT: 13 % (ref 24–43)
MCH RBC QN AUTO: 29.8 PG (ref 25.2–33.5)
MCHC RBC AUTO-ENTMCNC: 34 G/DL (ref 28.4–34.8)
MCV RBC AUTO: 87.7 FL (ref 82.6–102.9)
MONOCYTES NFR BLD: 0.55 K/UL (ref 0.1–1.2)
MONOCYTES NFR BLD: 9 % (ref 3–12)
NEUTROPHILS NFR BLD: 74 % (ref 36–65)
NEUTS SEG NFR BLD: 4.38 K/UL (ref 1.5–8.1)
NITRITE UR QL STRIP: NEGATIVE
NRBC BLD-RTO: 0 PER 100 WBC
PH UR STRIP: 7 [PH] (ref 5–9)
PLATELET # BLD AUTO: 192 K/UL (ref 138–453)
PMV BLD AUTO: 9.5 FL (ref 8.1–13.5)
POTASSIUM SERPL-SCNC: 4.2 MMOL/L (ref 3.7–5.3)
PROT SERPL-MCNC: 6 G/DL (ref 6.6–8.7)
PROT UR STRIP-MCNC: ABNORMAL MG/DL
RBC # BLD AUTO: 3.66 M/UL (ref 3.95–5.11)
RBC #/AREA URNS HPF: ABNORMAL /HPF (ref 0–2)
SODIUM SERPL-SCNC: 140 MMOL/L (ref 136–145)
SP GR UR STRIP: 1.01 (ref 1.01–1.02)
UROBILINOGEN UR STRIP-ACNC: NORMAL EU/DL (ref 0–1)
WBC #/AREA URNS HPF: ABNORMAL /HPF (ref 0–5)
WBC OTHER # BLD: 5.9 K/UL (ref 3.5–11.3)

## 2025-06-05 PROCEDURE — 2580000003 HC RX 258: Performed by: INTERNAL MEDICINE

## 2025-06-05 PROCEDURE — 96413 CHEMO IV INFUSION 1 HR: CPT

## 2025-06-05 PROCEDURE — 80053 COMPREHEN METABOLIC PANEL: CPT

## 2025-06-05 PROCEDURE — 2500000003 HC RX 250 WO HCPCS: Performed by: INTERNAL MEDICINE

## 2025-06-05 PROCEDURE — 81001 URINALYSIS AUTO W/SCOPE: CPT

## 2025-06-05 PROCEDURE — 85025 COMPLETE CBC W/AUTO DIFF WBC: CPT

## 2025-06-05 PROCEDURE — 86304 IMMUNOASSAY TUMOR CA 125: CPT

## 2025-06-05 PROCEDURE — 6360000002 HC RX W HCPCS: Performed by: INTERNAL MEDICINE

## 2025-06-05 RX ORDER — SODIUM CHLORIDE 0.9 % (FLUSH) 0.9 %
5-40 SYRINGE (ML) INJECTION PRN
Status: DISCONTINUED | OUTPATIENT
Start: 2025-06-05 | End: 2025-06-06 | Stop reason: HOSPADM

## 2025-06-05 RX ORDER — SODIUM CHLORIDE 9 MG/ML
5-250 INJECTION, SOLUTION INTRAVENOUS PRN
Status: DISCONTINUED | OUTPATIENT
Start: 2025-06-05 | End: 2025-06-06 | Stop reason: HOSPADM

## 2025-06-05 RX ORDER — HEPARIN 100 UNIT/ML
500 SYRINGE INTRAVENOUS PRN
Status: DISCONTINUED | OUTPATIENT
Start: 2025-06-05 | End: 2025-06-06 | Stop reason: HOSPADM

## 2025-06-05 RX ADMIN — SODIUM CHLORIDE, PRESERVATIVE FREE 10 ML: 5 INJECTION INTRAVENOUS at 11:49

## 2025-06-05 RX ADMIN — SODIUM CHLORIDE, PRESERVATIVE FREE 10 ML: 5 INJECTION INTRAVENOUS at 10:10

## 2025-06-05 RX ADMIN — SODIUM CHLORIDE 1000 MG: 9 INJECTION, SOLUTION INTRAVENOUS at 11:13

## 2025-06-05 RX ADMIN — Medication 500 UNITS: at 11:50

## 2025-06-05 RX ADMIN — SODIUM CHLORIDE, PRESERVATIVE FREE 10 ML: 5 INJECTION INTRAVENOUS at 11:50

## 2025-06-05 RX ADMIN — SODIUM CHLORIDE, PRESERVATIVE FREE 10 ML: 5 INJECTION INTRAVENOUS at 10:11

## 2025-06-05 RX ADMIN — SODIUM CHLORIDE 100 ML/HR: 0.9 INJECTION, SOLUTION INTRAVENOUS at 10:50

## 2025-06-07 NOTE — TELEPHONE ENCOUNTER
Ok per Dr Ric Darby signed
Patient would like to know if she can have an order for screening mammo? Last mammo was 5/24/17. Patient states no problems or concerns just yearly mammo check. Please let patient know.     Health Maintenance   Topic Date Due    DEXA (modify frequency per FRAX score)  07/01/2005    DTaP/Tdap/Td vaccine (1 - Tdap) 06/04/2019 (Originally 7/1/1959)    Pneumococcal low/med risk (1 of 2 - PCV13) 06/04/2019 (Originally 7/1/2005)    Shingles Vaccine (1 of 2 - 2 Dose Series) 06/04/2019 (Originally 7/1/1990)    Flu vaccine (1) 09/01/2018             (applicable per patient's age: Cancer Screenings, Depression Screening, Fall Risk Screening, Immunizations)    LDL Cholesterol (mg/dL)   Date Value   03/14/2012 111     LDL Calculated (mg/dL)   Date Value   05/17/2018 123     AST (U/L)   Date Value   03/14/2012 20     ALT (U/L)   Date Value   03/14/2012 16     BUN (mg/dL)   Date Value   05/17/2018 16      (goal A1C is < 7)   (goal LDL is <100) need 30-50% reduction from baseline     BP Readings from Last 3 Encounters:   06/04/18 120/68   05/08/18 138/68   05/15/17 136/62    (goal /80)      All Future Testing planned in [facility]:      Next Visit Date:  Future Appointments  Date Time Provider Ryan Jacob   7/9/2018 9:30 AM Jose Yost PT [facility]   8/30/2018 11:30 AM MD Marifer Olson [facility]            Patient Active Problem List:     Arthritis
3 = A little assistance

## 2025-06-18 ENCOUNTER — OFFICE VISIT (OUTPATIENT)
Dept: ONCOLOGY | Age: 85
End: 2025-06-18
Payer: MEDICARE

## 2025-06-18 VITALS
WEIGHT: 155 LBS | HEIGHT: 62 IN | BODY MASS INDEX: 28.52 KG/M2 | DIASTOLIC BLOOD PRESSURE: 72 MMHG | SYSTOLIC BLOOD PRESSURE: 145 MMHG | TEMPERATURE: 97.8 F | RESPIRATION RATE: 18 BRPM | HEART RATE: 84 BPM

## 2025-06-18 DIAGNOSIS — C56.3 MALIGNANT NEOPLASM OF BOTH OVARIES (HCC): Primary | ICD-10-CM

## 2025-06-18 PROCEDURE — G8417 CALC BMI ABV UP PARAM F/U: HCPCS | Performed by: INTERNAL MEDICINE

## 2025-06-18 PROCEDURE — 1036F TOBACCO NON-USER: CPT | Performed by: INTERNAL MEDICINE

## 2025-06-18 PROCEDURE — 1123F ACP DISCUSS/DSCN MKR DOCD: CPT | Performed by: INTERNAL MEDICINE

## 2025-06-18 PROCEDURE — 99214 OFFICE O/P EST MOD 30 MIN: CPT | Performed by: INTERNAL MEDICINE

## 2025-06-18 PROCEDURE — G8427 DOCREV CUR MEDS BY ELIG CLIN: HCPCS | Performed by: INTERNAL MEDICINE

## 2025-06-18 PROCEDURE — 1090F PRES/ABSN URINE INCON ASSESS: CPT | Performed by: INTERNAL MEDICINE

## 2025-06-18 PROCEDURE — 99211 OFF/OP EST MAY X REQ PHY/QHP: CPT | Performed by: INTERNAL MEDICINE

## 2025-06-18 PROCEDURE — G8400 PT W/DXA NO RESULTS DOC: HCPCS | Performed by: INTERNAL MEDICINE

## 2025-06-18 PROCEDURE — 1126F AMNT PAIN NOTED NONE PRSNT: CPT | Performed by: INTERNAL MEDICINE

## 2025-06-18 PROCEDURE — 1159F MED LIST DOCD IN RCRD: CPT | Performed by: INTERNAL MEDICINE

## 2025-06-18 NOTE — PATIENT INSTRUCTIONS
Continue maintenance bevacizumab as scheduled.  continue Norvasc twice daily  Need cbc, cmp  with each treatment   Rv to see us in 2-3 months.

## 2025-06-25 DIAGNOSIS — C76.2 ABDOMINAL CARCINOMATOSIS (HCC): Primary | ICD-10-CM

## 2025-06-25 DIAGNOSIS — C56.3 MALIGNANT NEOPLASM OF BOTH OVARIES (HCC): ICD-10-CM

## 2025-06-25 RX ORDER — ACETAMINOPHEN 325 MG/1
650 TABLET ORAL
Status: CANCELLED | OUTPATIENT
Start: 2025-06-26

## 2025-06-25 RX ORDER — HYDROCORTISONE SODIUM SUCCINATE 100 MG/2ML
100 INJECTION INTRAMUSCULAR; INTRAVENOUS
Status: CANCELLED | OUTPATIENT
Start: 2025-06-26

## 2025-06-25 RX ORDER — SODIUM CHLORIDE 9 MG/ML
INJECTION, SOLUTION INTRAVENOUS CONTINUOUS
Status: CANCELLED | OUTPATIENT
Start: 2025-06-26

## 2025-06-25 RX ORDER — SODIUM CHLORIDE 9 MG/ML
5-250 INJECTION, SOLUTION INTRAVENOUS PRN
Status: CANCELLED | OUTPATIENT
Start: 2025-06-26

## 2025-06-25 RX ORDER — SODIUM CHLORIDE 0.9 % (FLUSH) 0.9 %
5-40 SYRINGE (ML) INJECTION PRN
Status: CANCELLED | OUTPATIENT
Start: 2025-06-26

## 2025-06-25 RX ORDER — ALBUTEROL SULFATE 90 UG/1
4 INHALANT RESPIRATORY (INHALATION) PRN
Status: CANCELLED | OUTPATIENT
Start: 2025-06-26

## 2025-06-25 RX ORDER — EPINEPHRINE 1 MG/ML
0.3 INJECTION, SOLUTION, CONCENTRATE INTRAVENOUS PRN
Status: CANCELLED | OUTPATIENT
Start: 2025-06-26

## 2025-06-25 RX ORDER — FAMOTIDINE 10 MG/ML
20 INJECTION, SOLUTION INTRAVENOUS
Status: CANCELLED | OUTPATIENT
Start: 2025-06-26

## 2025-06-25 RX ORDER — DIPHENHYDRAMINE HYDROCHLORIDE 50 MG/ML
50 INJECTION, SOLUTION INTRAMUSCULAR; INTRAVENOUS
Status: CANCELLED | OUTPATIENT
Start: 2025-06-26

## 2025-06-25 RX ORDER — MEPERIDINE HYDROCHLORIDE 50 MG/ML
12.5 INJECTION INTRAMUSCULAR; INTRAVENOUS; SUBCUTANEOUS PRN
Status: CANCELLED | OUTPATIENT
Start: 2025-06-26

## 2025-06-25 RX ORDER — ONDANSETRON 2 MG/ML
8 INJECTION INTRAMUSCULAR; INTRAVENOUS
Status: CANCELLED | OUTPATIENT
Start: 2025-06-26

## 2025-06-25 RX ORDER — HEPARIN SODIUM (PORCINE) LOCK FLUSH IV SOLN 100 UNIT/ML 100 UNIT/ML
500 SOLUTION INTRAVENOUS PRN
Status: CANCELLED | OUTPATIENT
Start: 2025-06-26

## 2025-06-26 ENCOUNTER — HOSPITAL ENCOUNTER (OUTPATIENT)
Dept: INFUSION THERAPY | Age: 85
Discharge: HOME | End: 2025-06-26
Payer: MEDICARE

## 2025-06-26 VITALS
BODY MASS INDEX: 28.71 KG/M2 | WEIGHT: 156 LBS | DIASTOLIC BLOOD PRESSURE: 56 MMHG | SYSTOLIC BLOOD PRESSURE: 138 MMHG | HEART RATE: 69 BPM | TEMPERATURE: 97.6 F | HEIGHT: 62 IN | RESPIRATION RATE: 16 BRPM

## 2025-06-26 DIAGNOSIS — C76.2 ABDOMINAL CARCINOMATOSIS (HCC): ICD-10-CM

## 2025-06-26 DIAGNOSIS — C56.3 MALIGNANT NEOPLASM OF BOTH OVARIES (HCC): Primary | ICD-10-CM

## 2025-06-26 LAB
ALBUMIN SERPL-MCNC: 4 G/DL (ref 3.5–5.2)
ALBUMIN/GLOB SERPL: 1.7 {RATIO} (ref 1–2.5)
ALP SERPL-CCNC: 62 U/L (ref 35–104)
ALT SERPL-CCNC: 12 U/L (ref 10–35)
ANION GAP SERPL CALCULATED.3IONS-SCNC: 10 MMOL/L (ref 9–16)
AST SERPL-CCNC: 17 U/L (ref 10–35)
BACTERIA URNS QL MICRO: ABNORMAL
BASOPHILS # BLD: 0.05 K/UL (ref 0–0.2)
BASOPHILS NFR BLD: 1 % (ref 0–2)
BILIRUB SERPL-MCNC: 0.3 MG/DL (ref 0–1.2)
BILIRUB UR QL STRIP: NEGATIVE
BUN SERPL-MCNC: 27 MG/DL (ref 8–23)
BUN/CREAT SERPL: 25 (ref 9–20)
CALCIUM SERPL-MCNC: 8.9 MG/DL (ref 8.6–10.4)
CHLORIDE SERPL-SCNC: 104 MMOL/L (ref 98–107)
CLARITY UR: CLEAR
CO2 SERPL-SCNC: 26 MMOL/L (ref 20–31)
COLOR UR: YELLOW
CREAT SERPL-MCNC: 1.1 MG/DL (ref 0.5–0.9)
EOSINOPHIL # BLD: 0.19 K/UL (ref 0–0.44)
EOSINOPHILS RELATIVE PERCENT: 3 % (ref 1–4)
EPI CELLS #/AREA URNS HPF: ABNORMAL /HPF (ref 0–25)
ERYTHROCYTE [DISTWIDTH] IN BLOOD BY AUTOMATED COUNT: 12.8 % (ref 11.8–14.4)
GFR, ESTIMATED: 51 ML/MIN/1.73M2
GLUCOSE SERPL-MCNC: 106 MG/DL (ref 74–99)
GLUCOSE UR STRIP-MCNC: NEGATIVE MG/DL
HCT VFR BLD AUTO: 31.8 % (ref 36.3–47.1)
HGB BLD-MCNC: 11 G/DL (ref 11.9–15.1)
HGB UR QL STRIP.AUTO: NEGATIVE
IMM GRANULOCYTES # BLD AUTO: <0.03 K/UL (ref 0–0.3)
IMM GRANULOCYTES NFR BLD: 0 %
KETONES UR STRIP-MCNC: NEGATIVE MG/DL
LEUKOCYTE ESTERASE UR QL STRIP: ABNORMAL
LYMPHOCYTES NFR BLD: 0.95 K/UL (ref 1.1–3.7)
LYMPHOCYTES RELATIVE PERCENT: 15 % (ref 24–43)
MCH RBC QN AUTO: 30.2 PG (ref 25.2–33.5)
MCHC RBC AUTO-ENTMCNC: 34.6 G/DL (ref 28.4–34.8)
MCV RBC AUTO: 87.4 FL (ref 82.6–102.9)
MONOCYTES NFR BLD: 0.73 K/UL (ref 0.1–1.2)
MONOCYTES NFR BLD: 11 % (ref 3–12)
MUCOUS THREADS URNS QL MICRO: ABNORMAL
NEUTROPHILS NFR BLD: 70 % (ref 36–65)
NEUTS SEG NFR BLD: 4.54 K/UL (ref 1.5–8.1)
NITRITE UR QL STRIP: NEGATIVE
NRBC BLD-RTO: 0 PER 100 WBC
PH UR STRIP: 6 [PH] (ref 5–9)
PLATELET # BLD AUTO: 213 K/UL (ref 138–453)
PMV BLD AUTO: 9.1 FL (ref 8.1–13.5)
POTASSIUM SERPL-SCNC: 4.3 MMOL/L (ref 3.7–5.3)
PROT SERPL-MCNC: 6.3 G/DL (ref 6.6–8.7)
PROT UR STRIP-MCNC: ABNORMAL MG/DL
RBC # BLD AUTO: 3.64 M/UL (ref 3.95–5.11)
RBC #/AREA URNS HPF: ABNORMAL /HPF (ref 0–2)
SODIUM SERPL-SCNC: 140 MMOL/L (ref 136–145)
SP GR UR STRIP: 1.01 (ref 1.01–1.02)
UROBILINOGEN UR STRIP-ACNC: NORMAL EU/DL (ref 0–1)
WBC #/AREA URNS HPF: ABNORMAL /HPF (ref 0–5)
WBC OTHER # BLD: 6.5 K/UL (ref 3.5–11.3)

## 2025-06-26 PROCEDURE — 81001 URINALYSIS AUTO W/SCOPE: CPT

## 2025-06-26 PROCEDURE — 2500000003 HC RX 250 WO HCPCS: Performed by: INTERNAL MEDICINE

## 2025-06-26 PROCEDURE — 96413 CHEMO IV INFUSION 1 HR: CPT

## 2025-06-26 PROCEDURE — 6360000002 HC RX W HCPCS: Performed by: INTERNAL MEDICINE

## 2025-06-26 PROCEDURE — 80053 COMPREHEN METABOLIC PANEL: CPT

## 2025-06-26 PROCEDURE — 2580000003 HC RX 258: Performed by: INTERNAL MEDICINE

## 2025-06-26 PROCEDURE — 85025 COMPLETE CBC W/AUTO DIFF WBC: CPT

## 2025-06-26 RX ORDER — SODIUM CHLORIDE 0.9 % (FLUSH) 0.9 %
5-40 SYRINGE (ML) INJECTION PRN
Status: DISCONTINUED | OUTPATIENT
Start: 2025-06-26 | End: 2025-06-27 | Stop reason: HOSPADM

## 2025-06-26 RX ORDER — HEPARIN 100 UNIT/ML
500 SYRINGE INTRAVENOUS PRN
Status: DISCONTINUED | OUTPATIENT
Start: 2025-06-26 | End: 2025-06-27 | Stop reason: HOSPADM

## 2025-06-26 RX ORDER — SODIUM CHLORIDE 9 MG/ML
5-250 INJECTION, SOLUTION INTRAVENOUS PRN
Status: DISCONTINUED | OUTPATIENT
Start: 2025-06-26 | End: 2025-06-27 | Stop reason: HOSPADM

## 2025-06-26 RX ORDER — AMLODIPINE BESYLATE 10 MG/1
10 TABLET ORAL 2 TIMES DAILY
Qty: 60 TABLET | Refills: 2 | Status: SHIPPED | OUTPATIENT
Start: 2025-06-26

## 2025-06-26 RX ADMIN — SODIUM CHLORIDE, PRESERVATIVE FREE 10 ML: 5 INJECTION INTRAVENOUS at 09:59

## 2025-06-26 RX ADMIN — SODIUM CHLORIDE, PRESERVATIVE FREE 10 ML: 5 INJECTION INTRAVENOUS at 11:36

## 2025-06-26 RX ADMIN — SODIUM CHLORIDE 100 ML/HR: 0.9 INJECTION, SOLUTION INTRAVENOUS at 10:25

## 2025-06-26 RX ADMIN — SODIUM CHLORIDE, PRESERVATIVE FREE 10 ML: 5 INJECTION INTRAVENOUS at 11:35

## 2025-06-26 RX ADMIN — HEPARIN 500 UNITS: 100 SYRINGE at 11:36

## 2025-06-26 RX ADMIN — SODIUM CHLORIDE 1000 MG: 9 INJECTION, SOLUTION INTRAVENOUS at 11:03

## 2025-06-26 RX ADMIN — SODIUM CHLORIDE, PRESERVATIVE FREE 10 ML: 5 INJECTION INTRAVENOUS at 10:00

## 2025-06-26 NOTE — TELEPHONE ENCOUNTER
Ruth states she was told by Dr. Eugene to start taking the Norvac twice daily.; She states she will not need a refill. Per Dr. Eugene's note that does say BID, refill sent to patient's pharmacy, [facility] [place].

## 2025-06-30 ENCOUNTER — HOSPITAL ENCOUNTER (OUTPATIENT)
Dept: NON INVASIVE DIAGNOSTICS | Age: 85
Discharge: HOME | End: 2025-06-30
Payer: MEDICARE

## 2025-06-30 ENCOUNTER — OFFICE VISIT (OUTPATIENT)
Dept: FAMILY MEDICINE CLINIC | Age: 85
End: 2025-06-30

## 2025-06-30 VITALS
HEART RATE: 84 BPM | BODY MASS INDEX: 29.63 KG/M2 | SYSTOLIC BLOOD PRESSURE: 146 MMHG | WEIGHT: 161 LBS | DIASTOLIC BLOOD PRESSURE: 58 MMHG | HEIGHT: 62 IN | OXYGEN SATURATION: 97 %

## 2025-06-30 DIAGNOSIS — I10 PRIMARY HYPERTENSION: ICD-10-CM

## 2025-06-30 DIAGNOSIS — R22.43 LOCALIZED SWELLING OF BOTH LOWER LEGS: ICD-10-CM

## 2025-06-30 DIAGNOSIS — R01.1 HEART MURMUR: ICD-10-CM

## 2025-06-30 DIAGNOSIS — R01.1 HEART MURMUR: Primary | ICD-10-CM

## 2025-06-30 PROCEDURE — 93005 ELECTROCARDIOGRAM TRACING: CPT

## 2025-06-30 RX ORDER — LISINOPRIL 5 MG/1
5 TABLET ORAL DAILY
Qty: 30 TABLET | Refills: 1 | Status: SHIPPED | OUTPATIENT
Start: 2025-06-30

## 2025-06-30 ASSESSMENT — ENCOUNTER SYMPTOMS
SHORTNESS OF BREATH: 0
COUGH: 0
EYES NEGATIVE: 1
CHEST TIGHTNESS: 0
WHEEZING: 0
SORE THROAT: 0
RHINORRHEA: 0

## 2025-06-30 NOTE — PROGRESS NOTES
HPI Notes    Name: Ruth Hutton  : 1940         Chief Complaint:     Chief Complaint   Patient presents with    Leg Swelling     Bilateral x 4-5 days. Pt is in remission from ovarian cancer    Foot Swelling     Bilateral x 4-5 days. Pt is in remission from ovarian cancer       History of Present Illness:        HPI    Leg swelling over the last week onset on Friday some travel to [facility] but has gotten worse.   Pt with recent bp med change at oncologist office due to elevation on amlodipine 10 mg bid   Up to bathroom more, hard to fit in her shoes    Pt with hx ovarian cancer and treatment has done well   First times legs swelled.  Weight gain 6 # since last week   Wt Readings from Last 3 Encounters:   25 73 kg (161 lb)   25 70.8 kg (156 lb)   25 70.3 kg (155 lb)        Will return amlodipine back to once a day 10 mg tab and add lisinopril 5 mg daily at noon, hctz 25 mg daily continue     Will do EKG and echo to assure not cardiac related.   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   Gentle anemia    Lab Results   Component Value Date    WBC 6.5 2025    HGB 11.0 (L) 2025    HCT 31.8 (L) 2025    MCV 87.4 2025     2025               Past Medical History:     Past Medical History:   Diagnosis Date    Abdominal carcinomatosis (HCC)     Chemotherapy-induced neuropathy     Fingertips    GERD (gastroesophageal reflux disease)     Hypertension     Insomnia     Ovarian carcinoma (HCC)     Stage lllC    Restless leg     Urinary incontinence     bladder dropped      Reviewed all health maintenance requirements and ordered appropriate tests  Health Maintenance Due   Topic Date Due    DTaP/Tdap/Td vaccine (1 - Tdap) Never done    Shingles vaccine (1 of 2) Never done    Respiratory Syncytial Virus (RSV) Pregnant or age 60 yrs+ (1 - 1-dose 75+ series) Never done    Pneumococcal 50+ years

## 2025-06-30 NOTE — PATIENT INSTRUCTIONS
THANK YOU FOR TRUSTING US WITH YOUR HEALTHCARE !!    The associates caring for you today were:    Family Practice Provider: Jenni BECK-SABINO    Clinical Team Nurse: Deja Salamanca LPN    Clinical Team Medical Assistant: Ahstyn Ferrari MA    Our goal is to provide you with EXCEPTIONAL patient care, and we strive to do this for EVERY patient, EVERY visit. Since we care about you and your experience, you may receive a patient satisfaction survey from June Boyd by postal mail/email/text. We truly welcome your feedback and ask that you complete the survey to let us know how we are doing.    Important Numbers:  [facility] phone [phone]   [facility] Nurse/MA Line: [phone]  Fax  [phone]   Central [phone]  Billing questions: [phone]  Medical Records Request: [phone]    Manage your healthcare  with [facility] My[facility]. To activate your account, visit [email or web address]   DIGITAL scheduling available now through my chart.  Schedule your next appointment at your convenience through your my chart.       [facility] Hours:  Monday: [place] office location 8-5 ([phone]) Offering additional late hours the first Monday of the month until 7 pm.   Tuesday: 8: :  812 Noon, closed  of the month   : 7:30-4:30   SURVEY:    You may be receiving a survey from June Boyd regarding your visit today.    Please complete the survey to enable us to provide the highest quality of care to you and your family.    If you cannot score us a very good on any question, please call the office to discuss how we could have made your experience a very good one.    Thank you.         For the leg swelling.     Likely a side effect of increasing the norvasc (amlodipine) medication   Decreased that med back to daily in AM ONLY     2. Stay on your normal water pill HCTZ  as schedule permits

## 2025-07-01 ENCOUNTER — HOSPITAL ENCOUNTER (OUTPATIENT)
Age: 85
Discharge: HOME | End: 2025-07-03
Payer: MEDICARE

## 2025-07-01 VITALS — WEIGHT: 161 LBS | HEIGHT: 62 IN | BODY MASS INDEX: 29.63 KG/M2

## 2025-07-01 DIAGNOSIS — R22.43 LOCALIZED SWELLING OF BOTH LOWER LEGS: ICD-10-CM

## 2025-07-01 DIAGNOSIS — R01.1 HEART MURMUR: ICD-10-CM

## 2025-07-01 DIAGNOSIS — I10 PRIMARY HYPERTENSION: ICD-10-CM

## 2025-07-01 LAB
EKG ATRIAL RATE: 81 BPM
EKG P AXIS: 36 DEGREES
EKG P-R INTERVAL: 136 MS
EKG Q-T INTERVAL: 352 MS
EKG QRS DURATION: 82 MS
EKG QTC CALCULATION (BAZETT): 408 MS
EKG R AXIS: 13 DEGREES
EKG T AXIS: 74 DEGREES
EKG VENTRICULAR RATE: 81 BPM

## 2025-07-01 PROCEDURE — 93306 TTE W/DOPPLER COMPLETE: CPT

## 2025-07-01 PROCEDURE — 93010 ELECTROCARDIOGRAM REPORT: CPT | Performed by: INTERNAL MEDICINE

## 2025-07-02 ENCOUNTER — RESULTS FOLLOW-UP (OUTPATIENT)
Dept: FAMILY MEDICINE CLINIC | Age: 85
End: 2025-07-02

## 2025-07-02 ENCOUNTER — CARE COORDINATION (OUTPATIENT)
Dept: CARE COORDINATION | Age: 85
End: 2025-07-02

## 2025-07-02 LAB
ECHO AO ASC DIAM: 3.1 CM
ECHO AO ASCENDING AORTA INDEX: 1.78 CM/M2
ECHO AO ROOT DIAM: 2.7 CM
ECHO AO ROOT INDEX: 1.55 CM/M2
ECHO AV AREA PEAK VELOCITY: 1.9 CM2
ECHO AV AREA VTI: 2.1 CM2
ECHO AV AREA/BSA PEAK VELOCITY: 1.1 CM2/M2
ECHO AV AREA/BSA VTI: 1.2 CM2/M2
ECHO AV MEAN GRADIENT: 6 MMHG
ECHO AV MEAN VELOCITY: 1.2 M/S
ECHO AV PEAK GRADIENT: 13 MMHG
ECHO AV PEAK VELOCITY: 1.8 M/S
ECHO AV VELOCITY RATIO: 0.78
ECHO AV VTI: 37.9 CM
ECHO BSA: 1.79 M2
ECHO EST RA PRESSURE: 15 MMHG
ECHO LA DIAMETER INDEX: 2.47 CM/M2
ECHO LA DIAMETER: 4.3 CM
ECHO LA TO AORTIC ROOT RATIO: 1.59
ECHO LA VOL A-L A2C: 98 ML (ref 22–52)
ECHO LA VOL A-L A4C: 103 ML (ref 22–52)
ECHO LA VOL BP: 94 ML (ref 22–52)
ECHO LA VOL MOD A2C: 95 ML (ref 22–52)
ECHO LA VOL MOD A4C: 90 ML (ref 22–52)
ECHO LA VOL/BSA BIPLANE: 54 ML/M2 (ref 16–34)
ECHO LA VOLUME AREA LENGTH: 102 ML
ECHO LA VOLUME INDEX A-L A2C: 56 ML/M2 (ref 16–34)
ECHO LA VOLUME INDEX A-L A4C: 59 ML/M2 (ref 16–34)
ECHO LA VOLUME INDEX AREA LENGTH: 59 ML/M2 (ref 16–34)
ECHO LA VOLUME INDEX MOD A2C: 55 ML/M2 (ref 16–34)
ECHO LA VOLUME INDEX MOD A4C: 52 ML/M2 (ref 16–34)
ECHO LV E' LATERAL VELOCITY: 7.84 CM/S
ECHO LV E' SEPTAL VELOCITY: 9.24 CM/S
ECHO LV EDV A2C: 114 ML
ECHO LV EDV A4C: 82 ML
ECHO LV EDV BP: 96 ML (ref 56–104)
ECHO LV EDV INDEX A4C: 47 ML/M2
ECHO LV EDV INDEX BP: 55 ML/M2
ECHO LV EDV NDEX A2C: 66 ML/M2
ECHO LV EF PHYSICIAN: 62 %
ECHO LV EJECTION FRACTION A2C: 65 %
ECHO LV EJECTION FRACTION A4C: 58 %
ECHO LV EJECTION FRACTION BIPLANE: 62 % (ref 55–100)
ECHO LV ESV A2C: 40 ML
ECHO LV ESV A4C: 34 ML
ECHO LV ESV BP: 36 ML (ref 19–49)
ECHO LV ESV INDEX A2C: 23 ML/M2
ECHO LV ESV INDEX A4C: 20 ML/M2
ECHO LV ESV INDEX BP: 21 ML/M2
ECHO LV FRACTIONAL SHORTENING: 46 % (ref 28–44)
ECHO LV GLOBAL LONGITUDINAL STRAIN (GLS): -20.7 %
ECHO LV INTERNAL DIMENSION DIASTOLE INDEX: 2.36 CM/M2
ECHO LV INTERNAL DIMENSION DIASTOLIC: 4.1 CM (ref 3.9–5.3)
ECHO LV INTERNAL DIMENSION SYSTOLIC INDEX: 1.26 CM/M2
ECHO LV INTERNAL DIMENSION SYSTOLIC: 2.2 CM
ECHO LV IVSD: 1.3 CM (ref 0.6–0.9)
ECHO LV MASS 2D: 182.5 G (ref 67–162)
ECHO LV MASS INDEX 2D: 104.9 G/M2 (ref 43–95)
ECHO LV POSTERIOR WALL DIASTOLIC: 1.2 CM (ref 0.6–0.9)
ECHO LV RELATIVE WALL THICKNESS RATIO: 0.59
ECHO LVOT AREA: 2.5 CM2
ECHO LVOT AV VTI INDEX: 0.84
ECHO LVOT DIAM: 1.8 CM
ECHO LVOT MEAN GRADIENT: 4 MMHG
ECHO LVOT PEAK GRADIENT: 7 MMHG
ECHO LVOT PEAK VELOCITY: 1.4 M/S
ECHO LVOT STROKE VOLUME INDEX: 46.8 ML/M2
ECHO LVOT SV: 81.4 ML
ECHO LVOT VTI: 32 CM
ECHO MV A VELOCITY: 1.13 M/S
ECHO MV AREA PHT: 3.5 CM2
ECHO MV AREA VTI: 2.7 CM2
ECHO MV E DECELERATION TIME (DT): 135.1 MS
ECHO MV E VELOCITY: 1.21 M/S
ECHO MV E/A RATIO: 1.07
ECHO MV E/E' LATERAL: 15.43
ECHO MV E/E' RATIO (AVERAGED): 14.26
ECHO MV E/E' SEPTAL: 13.1
ECHO MV LVOT VTI INDEX: 0.94
ECHO MV MAX VELOCITY: 1.3 M/S
ECHO MV MEAN GRADIENT: 4 MMHG
ECHO MV MEAN VELOCITY: 0.9 M/S
ECHO MV PEAK GRADIENT: 7 MMHG
ECHO MV PRESSURE HALF TIME (PHT): 62.1 MS
ECHO MV REGURGITANT VELOCITY PISA: 6 M/S
ECHO MV REGURGITANT VTIA: 196.3 CM
ECHO MV VTI: 30.2 CM
ECHO PV MAX VELOCITY: 1.1 M/S
ECHO PV PEAK GRADIENT: 5 MMHG
ECHO RA END SYSTOLIC VOLUME APICAL 4 CHAMBER INDEX BSA: 24 ML/M2
ECHO RA VOLUME: 42 ML
ECHO RIGHT VENTRICULAR SYSTOLIC PRESSURE (RVSP): 65 MMHG
ECHO RV BASAL DIMENSION: 3.7 CM
ECHO RV LONGITUDINAL DIMENSION: 7.1 CM
ECHO RV MID DIMENSION: 3.1 CM
ECHO RV TAPSE: 2.9 CM (ref 1.7–?)
ECHO TV REGURGITANT MAX VELOCITY: 3.53 M/S
ECHO TV REGURGITANT PEAK GRADIENT: 50 MMHG

## 2025-07-02 PROCEDURE — 93306 TTE W/DOPPLER COMPLETE: CPT | Performed by: INTERNAL MEDICINE

## 2025-07-02 PROCEDURE — 93356 MYOCRD STRAIN IMG SPCKL TRCK: CPT | Performed by: INTERNAL MEDICINE

## 2025-07-02 NOTE — TELEPHONE ENCOUNTER
Pt results are in for testing tried to call from office.     She is able to take hctz extra pill now and again on Friday (yes that means she takes 2 water pills of the HCTZ  both these days. If we cannot get her tonight then make it Thursday and Saturday please.     Thanks  Jenni BECK CNP

## 2025-07-02 NOTE — CARE COORDINATION
Ambulatory Care Coordination Note     7/2/2025 9:18 AM     Patient outreach attempt by this ACM today to offer care management services. ACM was unable to reach the patient by telephone today;   left voice message requesting a return phone call to this ACM.     ACM: Tracey Corral RN     Care Summary Note: Per Jenni Houston request- follow up with patient today; how weight and BP since visit on Monday, 6/30? Get Lisinopril?     PCP/Specialist follow up:   Future Appointments         Provider Specialty Dept Phone    7/17/2025 10:00 AM SCHEDULE, [facility] MED ONC FAST TRACK CHAIR 1 Infusion Therapy [phone]    8/20/2025 2:15 PM Rell Gutiérrez MD Oncology [phone]            Follow Up:   Plan for next AC outreach in approximately 1-2 days  to complete:  - outreach attempt to offer care management services.

## 2025-07-02 NOTE — CARE COORDINATION
Ambulatory Care Coordination Note     2025 9:59 AM     Patient Current Location:  Home: [address] [place] [address]     This patient was received as a referral from Provider.    Patient contacted the ACM by telephone. Verified name and  with patient as identifiers. Provided introduction to self, and explanation of the ACM role.   Patient accepted care management services at this time.          ACM: Tracey Corral RN     Challenges to be reviewed by the provider   Additional needs identified to be addressed with provider Yes  Updates on weight and BP.        Method of communication with provider: chart routing.    Utilization: Initial Call - N/A    Care Summary Note: Incoming call from Ruth. Returning missed call from earlier today. 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 States she gets very tired. Confirmed she decreased Norvasc to 10 mg daily (in AM) and was able to  Lisinopril and take at noon. She continues to take her HCTZ daily in AM. States she completed the EKG and echo this week as requested. States she forgot to check her BP last evening because she had company and has not yet taken it today. States she forgot to weigh herself this morning but did yesterday and was 161. Denied shortness of breath or heavy chest. Denied chest pains. States legs are still very swollen, not really changed since Monday. States she did get the wedge pillow she had and utilized at bedtime. Trying to elevate when able throughout the day. Ruth states few times \"I think I just need a water pill to get this fluid off.\" Agreeable to ACM updating provider and calling back with response and recommendations.     Medications Reviewed:   Completed during this call      PCP/Specialist follow up:   Future Appointments         Provider Specialty Dept Phone    2025 10:00 AM SCHEDULE, [facility] MED ONC FAST TRACK CHAIR 1 Infusion Therapy [phone]    2025 2:15 PM Rell Gutiérrez,

## 2025-07-03 ENCOUNTER — TELEPHONE (OUTPATIENT)
Dept: FAMILY MEDICINE CLINIC | Age: 85
End: 2025-07-03

## 2025-07-03 DIAGNOSIS — R53.1 GENERALIZED WEAKNESS: ICD-10-CM

## 2025-07-03 DIAGNOSIS — R01.1 HEART MURMUR: Primary | ICD-10-CM

## 2025-07-03 DIAGNOSIS — I10 PRIMARY HYPERTENSION: ICD-10-CM

## 2025-07-03 DIAGNOSIS — R94.31 ABNORMAL EKG: ICD-10-CM

## 2025-07-03 DIAGNOSIS — R22.43 LOCALIZED SWELLING OF BOTH LOWER LEGS: ICD-10-CM

## 2025-07-03 DIAGNOSIS — C56.3 MALIGNANT NEOPLASM OF BOTH OVARIES (HCC): ICD-10-CM

## 2025-07-03 NOTE — TELEPHONE ENCOUNTER
Called opal Villagomez advised pt's supplies were shipped out on May 8, 2025 and is not due until 8/15/25 for new supplies. There is a limit of supplies due with Medicare.

## 2025-07-03 NOTE — TELEPHONE ENCOUNTER
Ruth needs us to call [employer] regarding her supplies and how often she uses. She tried calling and they said she is using more that she is supposed to. She is wanting us to write a letter. Ruth would like a call back.      Health Maintenance   Topic Date Due    DTaP/Tdap/Td vaccine (1 - Tdap) Never done    Shingles vaccine (1 of 2) Never done    Respiratory Syncytial Virus (RSV) Pregnant or age 60 yrs+ (1 - 1-dose 75+ series) Never done    Pneumococcal 50+ years Vaccine (2 of 2 - PPSV23) 06/29/2020    COVID-19 Vaccine (5 - 2024-25 season) 09/01/2024    Annual Wellness Visit (Medicare)  07/10/2025    Flu vaccine (1) 08/01/2025    Depression Screen  01/14/2026    Hepatitis A vaccine  Aged Out    Hepatitis B vaccine  Aged Out    Hib vaccine  Aged Out    Polio vaccine  Aged Out    Meningococcal (ACWY) vaccine  Aged Out    Meningococcal B vaccine  Aged Out             (applicable per patient's age: Cancer Screenings, Depression Screening, Fall Risk Screening, Immunizations)    AST (U/L)   Date Value   06/26/2025 17     ALT (U/L)   Date Value   06/26/2025 12     BUN (mg/dL)   Date Value   06/26/2025 27 (H)      (goal A1C is < 7)   (goal LDL is <100) need 30-50% reduction from baseline     BP Readings from Last 3 Encounters:   06/30/25 (!) 146/58   06/26/25 (!) 138/56   06/18/25 (!) 145/72    (goal /80)      All Future Testing planned in [facility]:  Lab Frequency Next Occurrence   BUN & Creatinine Once 07/03/2024   PET CT SKULL BASE TO MID THIGH Once 07/17/2024   PET CT SKULL BASE TO MID THIGH Once 07/29/2024        CBC with Auto Differential q treatment    Comprehensive Metabolic Panel q treatment     q treatment    Urinalysis         Next Visit Date:  Future Appointments   Date Time Provider Department Center   7/17/2025 10:00 AM SCHEDULE, [facility] MED ONC FAST TRACK CHAIR 1 [facility] MED ONC [place]   8/20/2025  2:15 PM Rell Gutiérrez MD tiff onc spe [facility]            Patient Active Problem List:

## 2025-07-03 NOTE — CARE COORDINATION
7/3/25    Attempted to call patient to update her on PCP response below. Male answered the phone and stated Ruth was not home at this time. Requested that he have Ruth call Jenni [facility] or this [facility] when she returns home. Contact numbers provided. Male voiced understanding.     PCP recommendations:         Care Coordination Plan of Care:   This nurse Care Coordinator will  - attempt to reach out to patient again later today if no return call or no notes that she reached out to PCP office   -follow up next week on leg swelling

## 2025-07-07 NOTE — TELEPHONE ENCOUNTER
Called return call she will pay out of pocket. Pt stated she had the flu and went through supplies fast. Pt stated she has skin irritation.     Pt needs     90542 six of them pouches  [id number] six of them the barriers

## 2025-07-07 NOTE — TELEPHONE ENCOUNTER
Spoke with [facility], pt has a Rx waiting to be filled, advised pt has had the flu and skin irration and has gone through supplies fast.       Alejandro rangel is sending out   10 pouches   10 barriers   Pt can also call   [employer] at [phone] at times the company can send samples to pt.     Also gave verbal to sent supplies to pt. [employer] hong paper work Dr. Liriano needs to sign for pt to receive her supplies really .

## 2025-07-08 ENCOUNTER — TELEPHONE (OUTPATIENT)
Dept: PRIMARY CARE CLINIC | Age: 85
End: 2025-07-08

## 2025-07-09 ENCOUNTER — CARE COORDINATION (OUTPATIENT)
Dept: CARE COORDINATION | Age: 85
End: 2025-07-09

## 2025-07-09 NOTE — TELEPHONE ENCOUNTER
Please check with pt if leg swelling is improved or not below knees bilaterally.     Started on lisinopril and was not at goal for bp so increased to TWO 5 mg tabs daily for 10 mg daily dose.     Full htn regimen  Amlodipine 10 mg daily   Hctz 25 mg daily   Lisinopril 10 mg daily       If pt bp at goal and leg swelling gone.     I will send in new lisinopril at 10 mg new dose to pharmacy of choice (back to me please to order)

## 2025-07-10 RX ORDER — LISINOPRIL 10 MG/1
10 TABLET ORAL DAILY
Qty: 30 TABLET | Refills: 2 | Status: SHIPPED | OUTPATIENT
Start: 2025-07-10

## 2025-07-10 NOTE — CARE COORDINATION
Ambulatory Care Coordination Note     7/10/2025 2:34 PM     Patient Current Location:  Home: [address] [place] [id number]     ACM contacted the patient by telephone. Verified name and  with patient as identifiers.         ACM: Tracey Corral RN     Challenges to be reviewed by the provider   Additional needs identified to be addressed with provider Yes  medications- patient did not get message/remember to increase Lisinopril to 10 mg daily- has continued with 5 mg daily.                Method of communication with provider: chart routing.    Utilization: Patient has not had any utilization since our last call.     Care Summary Note: Spoke with Ruth. Reports she was napping at time of call. Apologies given. Reports she has been doing fairly well. States that her leg swelling comes and goes. Reports that at night time she puts ice packs on her legs and has propped up  on a wedge elevated. No swelling in the morning time. Swelling starts around 11 am-12 pm. She does elevate legs when sitting throughout the day. States that she never got the message and or forgot about increasing her Lisinopril so did not increase to 10 mg daily- has continued taking 5 mg daily at noon. States BP is holding steady, today was 163/75. ACM to update PCP. States she has appointment with surgeon tomorrow in [place].     Medications Reviewed:   Completed during this call      PCP/Specialist follow up:   Future Appointments         Provider Specialty Dept Phone    2025 10:00 AM SCHEDULE, [facility] MED ONC FAST TRACK CHAIR 1 Infusion Therapy [phone]    2025 11:00 AM Aron Webster DO Cardiology [phone]    2025 2:15 PM Rell Gutiérrez MD Oncology [phone]            Follow Up:   Plan for next AC outreach in approximately 1-2 days  to complete:  - medication review .   Patient  is agreeable to this plan.

## 2025-07-11 NOTE — CARE COORDINATION
Ambulatory Care Coordination Note     7/11/2025 1:24 PM     Call placed to patient. Spoke with Ruth. Reports she just got back from her appointment in [place], Dr. Ortega. Reports that Dr. Ortega recommend she make follow up appointment with PCP and will fax over recommendations that they have to PCP. Informed Ruth of Jenni Houston message below. Ruth states pharmacy already let her know prescription is ready. She has not yet taken Lisinopril yet today so will take 2 of the 5 mg tablets now. Aware of no ice on legs. Ruth states she plans to call Dr. Liriano office to get an appointment made for next week right after this call.           Future Appointments   Date Time Provider Department Center   7/15/2025 10:40 AM Lary Liriano MD [facility]   7/17/2025 10:00 AM SCHEDULE, [facility] MED ONC FAST TRACK CHAIR 1 [facility] MED ONC [place]   8/4/2025 11:00 AM Aron Webster DO [facility]   8/20/2025  2:15 PM Rell Gutiérrez MD [place] [facility]     Care Coordination Plan of Care:   This nurse Care Coordinator will  - plan outreach call to patient following PCP appointment   -how are BP readings  -leg swelling   -medication review

## 2025-07-14 DIAGNOSIS — C76.2 ABDOMINAL CARCINOMATOSIS (HCC): Primary | ICD-10-CM

## 2025-07-14 DIAGNOSIS — C56.3 MALIGNANT NEOPLASM OF BOTH OVARIES (HCC): ICD-10-CM

## 2025-07-14 RX ORDER — DIPHENHYDRAMINE HYDROCHLORIDE 50 MG/ML
50 INJECTION, SOLUTION INTRAMUSCULAR; INTRAVENOUS
OUTPATIENT
Start: 2025-08-07

## 2025-07-14 RX ORDER — SODIUM CHLORIDE 9 MG/ML
5-250 INJECTION, SOLUTION INTRAVENOUS PRN
Status: CANCELLED | OUTPATIENT
Start: 2025-07-17

## 2025-07-14 RX ORDER — HYDROCORTISONE SODIUM SUCCINATE 100 MG/2ML
100 INJECTION INTRAMUSCULAR; INTRAVENOUS
OUTPATIENT
Start: 2025-08-07

## 2025-07-14 RX ORDER — ONDANSETRON 2 MG/ML
8 INJECTION INTRAMUSCULAR; INTRAVENOUS
OUTPATIENT
Start: 2025-08-07

## 2025-07-14 RX ORDER — HEPARIN SODIUM (PORCINE) LOCK FLUSH IV SOLN 100 UNIT/ML 100 UNIT/ML
500 SOLUTION INTRAVENOUS PRN
Status: CANCELLED | OUTPATIENT
Start: 2025-07-17

## 2025-07-14 RX ORDER — ACETAMINOPHEN 325 MG/1
650 TABLET ORAL
OUTPATIENT
Start: 2025-08-07

## 2025-07-14 RX ORDER — ALBUTEROL SULFATE 90 UG/1
4 INHALANT RESPIRATORY (INHALATION) PRN
OUTPATIENT
Start: 2025-08-07

## 2025-07-14 RX ORDER — ALBUTEROL SULFATE 90 UG/1
4 INHALANT RESPIRATORY (INHALATION) PRN
Status: CANCELLED | OUTPATIENT
Start: 2025-07-17

## 2025-07-14 RX ORDER — EPINEPHRINE 1 MG/ML
0.3 INJECTION, SOLUTION, CONCENTRATE INTRAVENOUS PRN
Status: CANCELLED | OUTPATIENT
Start: 2025-07-17

## 2025-07-14 RX ORDER — ONDANSETRON 2 MG/ML
8 INJECTION INTRAMUSCULAR; INTRAVENOUS
Status: CANCELLED | OUTPATIENT
Start: 2025-07-17

## 2025-07-14 RX ORDER — EPINEPHRINE 1 MG/ML
0.3 INJECTION, SOLUTION, CONCENTRATE INTRAVENOUS PRN
OUTPATIENT
Start: 2025-08-07

## 2025-07-14 RX ORDER — FAMOTIDINE 10 MG/ML
20 INJECTION, SOLUTION INTRAVENOUS
OUTPATIENT
Start: 2025-08-07

## 2025-07-14 RX ORDER — SODIUM CHLORIDE 9 MG/ML
INJECTION, SOLUTION INTRAVENOUS CONTINUOUS
Status: CANCELLED | OUTPATIENT
Start: 2025-07-17

## 2025-07-14 RX ORDER — SODIUM CHLORIDE 9 MG/ML
5-250 INJECTION, SOLUTION INTRAVENOUS PRN
OUTPATIENT
Start: 2025-08-07

## 2025-07-14 RX ORDER — HYDROCORTISONE SODIUM SUCCINATE 100 MG/2ML
100 INJECTION INTRAMUSCULAR; INTRAVENOUS
Status: CANCELLED | OUTPATIENT
Start: 2025-07-17

## 2025-07-14 RX ORDER — SODIUM CHLORIDE 0.9 % (FLUSH) 0.9 %
5-40 SYRINGE (ML) INJECTION PRN
Status: CANCELLED | OUTPATIENT
Start: 2025-07-17

## 2025-07-14 RX ORDER — MEPERIDINE HYDROCHLORIDE 50 MG/ML
12.5 INJECTION INTRAMUSCULAR; INTRAVENOUS; SUBCUTANEOUS PRN
OUTPATIENT
Start: 2025-08-07

## 2025-07-14 RX ORDER — MEPERIDINE HYDROCHLORIDE 50 MG/ML
12.5 INJECTION INTRAMUSCULAR; INTRAVENOUS; SUBCUTANEOUS PRN
Status: CANCELLED | OUTPATIENT
Start: 2025-07-17

## 2025-07-14 RX ORDER — HEPARIN SODIUM (PORCINE) LOCK FLUSH IV SOLN 100 UNIT/ML 100 UNIT/ML
500 SOLUTION INTRAVENOUS PRN
OUTPATIENT
Start: 2025-08-07

## 2025-07-14 RX ORDER — SODIUM CHLORIDE 9 MG/ML
INJECTION, SOLUTION INTRAVENOUS CONTINUOUS
OUTPATIENT
Start: 2025-08-07

## 2025-07-14 RX ORDER — DIPHENHYDRAMINE HYDROCHLORIDE 50 MG/ML
50 INJECTION, SOLUTION INTRAMUSCULAR; INTRAVENOUS
Status: CANCELLED | OUTPATIENT
Start: 2025-07-17

## 2025-07-14 RX ORDER — SODIUM CHLORIDE 0.9 % (FLUSH) 0.9 %
5-40 SYRINGE (ML) INJECTION PRN
OUTPATIENT
Start: 2025-08-07

## 2025-07-14 RX ORDER — ACETAMINOPHEN 325 MG/1
650 TABLET ORAL
Status: CANCELLED | OUTPATIENT
Start: 2025-07-17

## 2025-07-14 RX ORDER — FAMOTIDINE 10 MG/ML
20 INJECTION, SOLUTION INTRAVENOUS
Status: CANCELLED | OUTPATIENT
Start: 2025-07-17

## 2025-07-15 ENCOUNTER — OFFICE VISIT (OUTPATIENT)
Dept: FAMILY MEDICINE CLINIC | Age: 85
End: 2025-07-15

## 2025-07-15 VITALS
BODY MASS INDEX: 28.9 KG/M2 | HEART RATE: 80 BPM | SYSTOLIC BLOOD PRESSURE: 130 MMHG | DIASTOLIC BLOOD PRESSURE: 68 MMHG | OXYGEN SATURATION: 97 % | WEIGHT: 158 LBS

## 2025-07-15 DIAGNOSIS — R22.43 LOCALIZED SWELLING OF BOTH LOWER LEGS: Primary | ICD-10-CM

## 2025-07-15 ASSESSMENT — ENCOUNTER SYMPTOMS
SHORTNESS OF BREATH: 0
DIARRHEA: 0
VOMITING: 0
COUGH: 0
EYE DISCHARGE: 0
EYE REDNESS: 0

## 2025-07-15 NOTE — PROGRESS NOTES
HPI Notes    Name: Ruth Hutton  : 1940        Chief Complaint:     Chief Complaint   Patient presents with    Leg Swelling     Patient here today with bilateral leg swelling       History of Present Illness:     Ruth Hutton is a 85 y.o.  female who presents with Leg Swelling (Patient here today with bilateral leg swelling)      HPI  Leg swelling - started 2wks ago with lower leg swelling. It started after riding in the golf cart all day at a farm show. NO SOB but a little cough. Pt is still having Chemo infusions but no changes there. Pt saw Jenni on  for the leg swelling. Pt has NOT gained weight.   No bladder or bowel changes. Pt admits her leg swelling is down in the AM and worse through day time as pt is active. Pt does try to elevate on rest. Pt has decreased the NORVASC 10mg to ONCE a day, pt stay on the HCTZ 25mg and on the lisinopril 10mg daily.    Pt states leg swelling is better.     Past Medical History:     Past Medical History:   Diagnosis Date    Abdominal carcinomatosis (HCC)     Chemotherapy-induced neuropathy     Fingertips    GERD (gastroesophageal reflux disease)     Hypertension     Insomnia     Ovarian carcinoma (HCC)     Stage lllC    Restless leg     Urinary incontinence     bladder dropped      Reviewed all health maintenance requirements and ordered appropriate tests  Health Maintenance Due   Topic Date Due    DTaP/Tdap/Td vaccine (1 - Tdap) Never done    Shingles vaccine (1 of 2) Never done    Respiratory Syncytial Virus (RSV) Pregnant or age 60 yrs+ (1 - 1-dose 75+ series) Never done    Pneumococcal 50+ years Vaccine (2 of 2 - PPSV23) 2020    COVID-19 Vaccine ( -  season) 2024    Annual Wellness Visit (Medicare)  07/10/2025       Past Surgical History:     Past Surgical History:   Procedure Laterality Date    APPENDECTOMY      BLADDER SUSPENSION  2012    CARPAL TUNNEL RELEASE  1963    HYSTERECTOMY (CERVIX STATUS UNKNOWN)      IR BIOPSY

## 2025-07-15 NOTE — PATIENT INSTRUCTIONS
SURVEY:    You may be receiving a survey from [employer] regarding your visit today.    Please complete the survey to enable us to provide the highest quality of care to you and your family.      Thank you.    Clinical Care Team: MD Niko Morrison LPN              Triage: Maru Dunaway CMA              Clerical Team: Maru Schroeder

## 2025-07-17 ENCOUNTER — CARE COORDINATION (OUTPATIENT)
Dept: CARE COORDINATION | Age: 85
End: 2025-07-17

## 2025-07-17 ENCOUNTER — HOSPITAL ENCOUNTER (OUTPATIENT)
Dept: INFUSION THERAPY | Age: 85
Discharge: HOME | End: 2025-07-17
Payer: MEDICARE

## 2025-07-17 VITALS
RESPIRATION RATE: 18 BRPM | DIASTOLIC BLOOD PRESSURE: 65 MMHG | HEIGHT: 62 IN | TEMPERATURE: 97.8 F | BODY MASS INDEX: 29.26 KG/M2 | WEIGHT: 159 LBS | HEART RATE: 80 BPM | SYSTOLIC BLOOD PRESSURE: 130 MMHG

## 2025-07-17 DIAGNOSIS — C56.3 MALIGNANT NEOPLASM OF BOTH OVARIES (HCC): Primary | ICD-10-CM

## 2025-07-17 DIAGNOSIS — C76.2 ABDOMINAL CARCINOMATOSIS (HCC): ICD-10-CM

## 2025-07-17 LAB
ALBUMIN SERPL-MCNC: 3.9 G/DL (ref 3.5–5.2)
ALBUMIN/GLOB SERPL: 1.9 {RATIO} (ref 1–2.5)
ALP SERPL-CCNC: 72 U/L (ref 35–104)
ALT SERPL-CCNC: 12 U/L (ref 10–35)
AMORPH SED URNS QL MICRO: ABNORMAL
ANION GAP SERPL CALCULATED.3IONS-SCNC: 11 MMOL/L (ref 9–16)
AST SERPL-CCNC: 17 U/L (ref 10–35)
BASOPHILS # BLD: 0.06 K/UL (ref 0–0.2)
BASOPHILS NFR BLD: 1 % (ref 0–2)
BILIRUB SERPL-MCNC: 0.2 MG/DL (ref 0–1.2)
BILIRUB UR QL STRIP: NEGATIVE
BUN SERPL-MCNC: 23 MG/DL (ref 8–23)
BUN/CREAT SERPL: 23 (ref 9–20)
CALCIUM SERPL-MCNC: 8.7 MG/DL (ref 8.6–10.4)
CANCER AG125 SERPL-ACNC: 10 U/ML (ref 0–38)
CHLORIDE SERPL-SCNC: 106 MMOL/L (ref 98–107)
CLARITY UR: CLEAR
CO2 SERPL-SCNC: 25 MMOL/L (ref 20–31)
COLOR UR: YELLOW
CREAT SERPL-MCNC: 1 MG/DL (ref 0.5–0.9)
EOSINOPHIL # BLD: 0.18 K/UL (ref 0–0.44)
EOSINOPHILS RELATIVE PERCENT: 3 % (ref 1–4)
EPI CELLS #/AREA URNS HPF: ABNORMAL /HPF (ref 0–25)
ERYTHROCYTE [DISTWIDTH] IN BLOOD BY AUTOMATED COUNT: 13.2 % (ref 11.8–14.4)
GFR, ESTIMATED: 58 ML/MIN/1.73M2
GLUCOSE SERPL-MCNC: 99 MG/DL (ref 74–99)
GLUCOSE UR STRIP-MCNC: NEGATIVE MG/DL
HCT VFR BLD AUTO: 31.7 % (ref 36.3–47.1)
HGB BLD-MCNC: 10.8 G/DL (ref 11.9–15.1)
HGB UR QL STRIP.AUTO: NEGATIVE
IMM GRANULOCYTES # BLD AUTO: <0.03 K/UL (ref 0–0.3)
IMM GRANULOCYTES NFR BLD: 0 %
KETONES UR STRIP-MCNC: NEGATIVE MG/DL
LEUKOCYTE ESTERASE UR QL STRIP: NEGATIVE
LYMPHOCYTES NFR BLD: 0.83 K/UL (ref 1.1–3.7)
LYMPHOCYTES RELATIVE PERCENT: 12 % (ref 24–43)
MCH RBC QN AUTO: 29.8 PG (ref 25.2–33.5)
MCHC RBC AUTO-ENTMCNC: 34.1 G/DL (ref 28.4–34.8)
MCV RBC AUTO: 87.3 FL (ref 82.6–102.9)
MONOCYTES NFR BLD: 0.71 K/UL (ref 0.1–1.2)
MONOCYTES NFR BLD: 11 % (ref 3–12)
NEUTROPHILS NFR BLD: 73 % (ref 36–65)
NEUTS SEG NFR BLD: 4.97 K/UL (ref 1.5–8.1)
NITRITE UR QL STRIP: NEGATIVE
NRBC BLD-RTO: 0 PER 100 WBC
PH UR STRIP: 7.5 [PH] (ref 5–9)
PLATELET # BLD AUTO: 210 K/UL (ref 138–453)
PMV BLD AUTO: 9.6 FL (ref 8.1–13.5)
POTASSIUM SERPL-SCNC: 4.2 MMOL/L (ref 3.7–5.3)
PROT SERPL-MCNC: 5.9 G/DL (ref 6.6–8.7)
PROT UR STRIP-MCNC: ABNORMAL MG/DL
RBC # BLD AUTO: 3.63 M/UL (ref 3.95–5.11)
RBC #/AREA URNS HPF: ABNORMAL /HPF (ref 0–2)
SODIUM SERPL-SCNC: 142 MMOL/L (ref 136–145)
SP GR UR STRIP: 1.01 (ref 1.01–1.02)
UROBILINOGEN UR STRIP-ACNC: NORMAL EU/DL (ref 0–1)
WBC #/AREA URNS HPF: ABNORMAL /HPF (ref 0–5)
WBC OTHER # BLD: 6.8 K/UL (ref 3.5–11.3)

## 2025-07-17 PROCEDURE — 2500000003 HC RX 250 WO HCPCS: Performed by: INTERNAL MEDICINE

## 2025-07-17 PROCEDURE — 6360000002 HC RX W HCPCS: Performed by: INTERNAL MEDICINE

## 2025-07-17 PROCEDURE — 96413 CHEMO IV INFUSION 1 HR: CPT

## 2025-07-17 PROCEDURE — 86304 IMMUNOASSAY TUMOR CA 125: CPT

## 2025-07-17 PROCEDURE — 85025 COMPLETE CBC W/AUTO DIFF WBC: CPT

## 2025-07-17 PROCEDURE — 2580000003 HC RX 258: Performed by: INTERNAL MEDICINE

## 2025-07-17 PROCEDURE — 81001 URINALYSIS AUTO W/SCOPE: CPT

## 2025-07-17 PROCEDURE — 80053 COMPREHEN METABOLIC PANEL: CPT

## 2025-07-17 PROCEDURE — 36415 COLL VENOUS BLD VENIPUNCTURE: CPT

## 2025-07-17 RX ORDER — SODIUM CHLORIDE 9 MG/ML
5-250 INJECTION, SOLUTION INTRAVENOUS PRN
Status: DISCONTINUED | OUTPATIENT
Start: 2025-07-17 | End: 2025-07-18 | Stop reason: HOSPADM

## 2025-07-17 RX ORDER — SODIUM CHLORIDE 0.9 % (FLUSH) 0.9 %
5-40 SYRINGE (ML) INJECTION PRN
Status: DISCONTINUED | OUTPATIENT
Start: 2025-07-17 | End: 2025-07-18 | Stop reason: HOSPADM

## 2025-07-17 RX ORDER — HEPARIN 100 UNIT/ML
500 SYRINGE INTRAVENOUS PRN
Status: DISCONTINUED | OUTPATIENT
Start: 2025-07-17 | End: 2025-07-18 | Stop reason: HOSPADM

## 2025-07-17 RX ADMIN — SODIUM CHLORIDE 100 ML/HR: 0.9 INJECTION, SOLUTION INTRAVENOUS at 10:59

## 2025-07-17 RX ADMIN — SODIUM CHLORIDE, PRESERVATIVE FREE 10 ML: 5 INJECTION INTRAVENOUS at 11:48

## 2025-07-17 RX ADMIN — HEPARIN 500 UNITS: 100 SYRINGE at 11:49

## 2025-07-17 RX ADMIN — SODIUM CHLORIDE 1000 MG: 9 INJECTION, SOLUTION INTRAVENOUS at 11:13

## 2025-07-17 RX ADMIN — SODIUM CHLORIDE, PRESERVATIVE FREE 10 ML: 5 INJECTION INTRAVENOUS at 11:49

## 2025-07-17 RX ADMIN — SODIUM CHLORIDE, PRESERVATIVE FREE 10 ML: 5 INJECTION INTRAVENOUS at 09:21

## 2025-07-17 RX ADMIN — SODIUM CHLORIDE, PRESERVATIVE FREE 10 ML: 5 INJECTION INTRAVENOUS at 09:20

## 2025-07-17 NOTE — CARE COORDINATION
Ambulatory Care Coordination Note     7/17/2025      Patient outreach attempt by this ACM today to perform care management follow up . ACM was unable to reach the patient by telephone today;   left voice message requesting a return phone call to this ACM.     ACM: Tracey Corral, RN     Care Summary Note: chart reviewed     PCP/Specialist follow up:   Future Appointments         Provider Specialty Dept Phone    7/22/2025 10:00 AM SCHEDULE, [facility] Family Medicine [phone]    8/4/2025 11:00 AM Aron Webster DO Cardiology [phone]    8/7/2025 10:00 AM SCHEDULE, [facility] MED ONC CHAIR 4 Infusion Therapy [phone]    8/20/2025 2:15 PM Rell Gutiérrez MD Oncology [phone]            Follow Up:   Plan for next AC outreach in approximately 2 weeks to complete:  - disease specific assessments  - medication review  - goal progression  - education   - follow-up appointment with MA-BP follow up from 7/22.

## 2025-07-22 ENCOUNTER — CLINICAL SUPPORT (OUTPATIENT)
Dept: FAMILY MEDICINE CLINIC | Age: 85
End: 2025-07-22

## 2025-07-22 VITALS — HEART RATE: 86 BPM | OXYGEN SATURATION: 96 % | SYSTOLIC BLOOD PRESSURE: 138 MMHG | DIASTOLIC BLOOD PRESSURE: 78 MMHG

## 2025-07-22 DIAGNOSIS — I10 PRIMARY HYPERTENSION: Primary | ICD-10-CM

## 2025-07-22 RX ORDER — PRAMIPEXOLE DIHYDROCHLORIDE 1.5 MG/1
1.5 TABLET ORAL NIGHTLY
Qty: 30 TABLET | Refills: 11 | Status: SHIPPED | OUTPATIENT
Start: 2025-07-22

## 2025-07-22 RX ORDER — HYDROCHLOROTHIAZIDE 25 MG/1
25 TABLET ORAL DAILY
Qty: 30 TABLET | Refills: 5 | Status: SHIPPED | OUTPATIENT
Start: 2025-07-22

## 2025-07-22 NOTE — TELEPHONE ENCOUNTER
Last OV: 7/22/2025  Last RX:    Next scheduled apt: Visit date not found        Pt requesting a refill

## 2025-07-24 ENCOUNTER — TELEPHONE (OUTPATIENT)
Dept: FAMILY MEDICINE CLINIC | Age: 85
End: 2025-07-24

## 2025-07-24 NOTE — PROGRESS NOTES
Please tell pt her BP is still a little high so>>  1.) keep on Norvasc 10mg ONLY ONE a day  2.) HCTZ 25mg one daily  3.) BUT increase the the lisinopril 10mg to TWO pills  4.) ck BP at home but come here next Tues or Wed for BP here with a nurse

## 2025-07-24 NOTE — TELEPHONE ENCOUNTER
Message left for patient regarding 's recommendation regarding BP    Told patient to call our office.

## 2025-07-24 NOTE — TELEPHONE ENCOUNTER
----- Message from Dr. Lary Liriano MD sent at 7/24/2025  8:40 AM EDT -----        ----- Message -----  From: Maru Dunaway MA  Sent: 7/23/2025   8:30 AM EDT  To: Lary Liriano MD

## 2025-08-04 ENCOUNTER — OFFICE VISIT (OUTPATIENT)
Dept: CARDIOLOGY CLINIC | Age: 85
End: 2025-08-04
Payer: MEDICARE

## 2025-08-04 VITALS
HEART RATE: 72 BPM | DIASTOLIC BLOOD PRESSURE: 63 MMHG | BODY MASS INDEX: 27.98 KG/M2 | OXYGEN SATURATION: 95 % | WEIGHT: 153 LBS | SYSTOLIC BLOOD PRESSURE: 164 MMHG

## 2025-08-04 DIAGNOSIS — Z82.49 FAMILY HISTORY OF ISCHEMIC HEART DISEASE AND OTHER DISEASES OF THE CIRCULATORY SYSTEM: ICD-10-CM

## 2025-08-04 DIAGNOSIS — I51.7 LVH (LEFT VENTRICULAR HYPERTROPHY): ICD-10-CM

## 2025-08-04 DIAGNOSIS — R60.0 MILD PERIPHERAL EDEMA: ICD-10-CM

## 2025-08-04 DIAGNOSIS — I27.20 PULMONARY HYPERTENSION (HCC): ICD-10-CM

## 2025-08-04 DIAGNOSIS — I07.1 MODERATE TRICUSPID REGURGITATION: ICD-10-CM

## 2025-08-04 DIAGNOSIS — E55.9 VITAMIN D DEFICIENCY: ICD-10-CM

## 2025-08-04 DIAGNOSIS — I10 PRIMARY HYPERTENSION: Primary | ICD-10-CM

## 2025-08-04 DIAGNOSIS — M19.90 ARTHRITIS: ICD-10-CM

## 2025-08-04 DIAGNOSIS — I51.7 LEFT ATRIAL ENLARGEMENT: ICD-10-CM

## 2025-08-04 PROCEDURE — 1123F ACP DISCUSS/DSCN MKR DOCD: CPT | Performed by: INTERNAL MEDICINE

## 2025-08-04 PROCEDURE — G8400 PT W/DXA NO RESULTS DOC: HCPCS | Performed by: INTERNAL MEDICINE

## 2025-08-04 PROCEDURE — 3078F DIAST BP <80 MM HG: CPT | Performed by: INTERNAL MEDICINE

## 2025-08-04 PROCEDURE — 1090F PRES/ABSN URINE INCON ASSESS: CPT | Performed by: INTERNAL MEDICINE

## 2025-08-04 PROCEDURE — 1159F MED LIST DOCD IN RCRD: CPT | Performed by: INTERNAL MEDICINE

## 2025-08-04 PROCEDURE — G8427 DOCREV CUR MEDS BY ELIG CLIN: HCPCS | Performed by: INTERNAL MEDICINE

## 2025-08-04 PROCEDURE — 1036F TOBACCO NON-USER: CPT | Performed by: INTERNAL MEDICINE

## 2025-08-04 PROCEDURE — 3077F SYST BP >= 140 MM HG: CPT | Performed by: INTERNAL MEDICINE

## 2025-08-04 PROCEDURE — 99204 OFFICE O/P NEW MOD 45 MIN: CPT | Performed by: INTERNAL MEDICINE

## 2025-08-04 PROCEDURE — G8417 CALC BMI ABV UP PARAM F/U: HCPCS | Performed by: INTERNAL MEDICINE

## 2025-08-04 RX ORDER — LISINOPRIL 10 MG/1
10 TABLET ORAL 2 TIMES DAILY
Qty: 60 TABLET | Refills: 6 | Status: SHIPPED | OUTPATIENT
Start: 2025-08-04

## 2025-08-07 ENCOUNTER — TELEPHONE (OUTPATIENT)
Dept: ONCOLOGY | Age: 85
End: 2025-08-07

## 2025-08-07 ENCOUNTER — HOSPITAL ENCOUNTER (OUTPATIENT)
Dept: INFUSION THERAPY | Age: 85
Discharge: HOME | End: 2025-08-07
Payer: MEDICARE

## 2025-08-07 ENCOUNTER — CARE COORDINATION (OUTPATIENT)
Dept: CARE COORDINATION | Age: 85
End: 2025-08-07

## 2025-08-07 VITALS
TEMPERATURE: 97.6 F | HEART RATE: 66 BPM | DIASTOLIC BLOOD PRESSURE: 68 MMHG | HEIGHT: 62 IN | WEIGHT: 153 LBS | SYSTOLIC BLOOD PRESSURE: 153 MMHG | RESPIRATION RATE: 18 BRPM | BODY MASS INDEX: 28.16 KG/M2

## 2025-08-07 DIAGNOSIS — C56.3 MALIGNANT NEOPLASM OF BOTH OVARIES (HCC): Primary | ICD-10-CM

## 2025-08-07 DIAGNOSIS — C76.2 ABDOMINAL CARCINOMATOSIS (HCC): ICD-10-CM

## 2025-08-07 LAB
ALBUMIN SERPL-MCNC: 3.8 G/DL (ref 3.5–5.2)
ALBUMIN/GLOB SERPL: 1.7 {RATIO} (ref 1–2.5)
ALP SERPL-CCNC: 60 U/L (ref 35–104)
ALT SERPL-CCNC: 12 U/L (ref 10–35)
ANION GAP SERPL CALCULATED.3IONS-SCNC: 10 MMOL/L (ref 9–16)
AST SERPL-CCNC: 17 U/L (ref 10–35)
BACTERIA URNS QL MICRO: ABNORMAL
BASOPHILS # BLD: 0.05 K/UL (ref 0–0.2)
BASOPHILS NFR BLD: 1 % (ref 0–2)
BILIRUB SERPL-MCNC: 0.3 MG/DL (ref 0–1.2)
BILIRUB UR QL STRIP: NEGATIVE
BUN SERPL-MCNC: 24 MG/DL (ref 8–23)
BUN/CREAT SERPL: 20 (ref 9–20)
CALCIUM SERPL-MCNC: 8.8 MG/DL (ref 8.6–10.4)
CANCER AG125 SERPL-ACNC: 10 U/ML (ref 0–38)
CHLORIDE SERPL-SCNC: 105 MMOL/L (ref 98–107)
CLARITY UR: ABNORMAL
CO2 SERPL-SCNC: 25 MMOL/L (ref 20–31)
COLOR UR: YELLOW
CREAT SERPL-MCNC: 1.2 MG/DL (ref 0.5–0.9)
EOSINOPHIL # BLD: 0.14 K/UL (ref 0–0.44)
EOSINOPHILS RELATIVE PERCENT: 2 % (ref 1–4)
EPI CELLS #/AREA URNS HPF: ABNORMAL /HPF (ref 0–25)
ERYTHROCYTE [DISTWIDTH] IN BLOOD BY AUTOMATED COUNT: 13.2 % (ref 11.8–14.4)
GFR, ESTIMATED: 46 ML/MIN/1.73M2
GLUCOSE SERPL-MCNC: 103 MG/DL (ref 74–99)
GLUCOSE UR STRIP-MCNC: NEGATIVE MG/DL
HCT VFR BLD AUTO: 33.6 % (ref 36.3–47.1)
HGB BLD-MCNC: 11.2 G/DL (ref 11.9–15.1)
HGB UR QL STRIP.AUTO: NEGATIVE
IMM GRANULOCYTES # BLD AUTO: <0.03 K/UL (ref 0–0.3)
IMM GRANULOCYTES NFR BLD: 0 %
KETONES UR STRIP-MCNC: NEGATIVE MG/DL
LEUKOCYTE ESTERASE UR QL STRIP: ABNORMAL
LYMPHOCYTES NFR BLD: 0.95 K/UL (ref 1.1–3.7)
LYMPHOCYTES RELATIVE PERCENT: 14 % (ref 24–43)
MCH RBC QN AUTO: 29.4 PG (ref 25.2–33.5)
MCHC RBC AUTO-ENTMCNC: 33.3 G/DL (ref 28.4–34.8)
MCV RBC AUTO: 88.2 FL (ref 82.6–102.9)
MONOCYTES NFR BLD: 0.71 K/UL (ref 0.1–1.2)
MONOCYTES NFR BLD: 10 % (ref 3–12)
NEUTROPHILS NFR BLD: 73 % (ref 36–65)
NEUTS SEG NFR BLD: 5.03 K/UL (ref 1.5–8.1)
NITRITE UR QL STRIP: NEGATIVE
NRBC BLD-RTO: 0 PER 100 WBC
PH UR STRIP: 7 [PH] (ref 5–9)
PLATELET # BLD AUTO: 206 K/UL (ref 138–453)
PMV BLD AUTO: 10 FL (ref 8.1–13.5)
POTASSIUM SERPL-SCNC: 4.1 MMOL/L (ref 3.7–5.3)
PROT SERPL-MCNC: 6.1 G/DL (ref 6.6–8.7)
PROT UR STRIP-MCNC: ABNORMAL MG/DL
RBC # BLD AUTO: 3.81 M/UL (ref 3.95–5.11)
RBC #/AREA URNS HPF: ABNORMAL /HPF (ref 0–2)
RENAL EPITHELIAL, UA: ABNORMAL /HPF
SODIUM SERPL-SCNC: 140 MMOL/L (ref 136–145)
SP GR UR STRIP: 1.01 (ref 1.01–1.02)
UROBILINOGEN UR STRIP-ACNC: NORMAL EU/DL (ref 0–1)
WBC #/AREA URNS HPF: ABNORMAL /HPF (ref 0–5)
WBC OTHER # BLD: 6.9 K/UL (ref 3.5–11.3)

## 2025-08-07 PROCEDURE — 6360000002 HC RX W HCPCS: Performed by: INTERNAL MEDICINE

## 2025-08-07 PROCEDURE — 96413 CHEMO IV INFUSION 1 HR: CPT

## 2025-08-07 PROCEDURE — 81001 URINALYSIS AUTO W/SCOPE: CPT

## 2025-08-07 PROCEDURE — 2500000003 HC RX 250 WO HCPCS: Performed by: INTERNAL MEDICINE

## 2025-08-07 PROCEDURE — 85025 COMPLETE CBC W/AUTO DIFF WBC: CPT

## 2025-08-07 PROCEDURE — 2580000003 HC RX 258: Performed by: INTERNAL MEDICINE

## 2025-08-07 PROCEDURE — 86304 IMMUNOASSAY TUMOR CA 125: CPT

## 2025-08-07 PROCEDURE — 80053 COMPREHEN METABOLIC PANEL: CPT

## 2025-08-07 RX ORDER — HEPARIN 100 UNIT/ML
500 SYRINGE INTRAVENOUS PRN
Status: DISCONTINUED | OUTPATIENT
Start: 2025-08-07 | End: 2025-08-08 | Stop reason: HOSPADM

## 2025-08-07 RX ORDER — SODIUM CHLORIDE 0.9 % (FLUSH) 0.9 %
5-40 SYRINGE (ML) INJECTION PRN
Status: DISCONTINUED | OUTPATIENT
Start: 2025-08-07 | End: 2025-08-08 | Stop reason: HOSPADM

## 2025-08-07 RX ORDER — SODIUM CHLORIDE 9 MG/ML
5-250 INJECTION, SOLUTION INTRAVENOUS PRN
Status: DISCONTINUED | OUTPATIENT
Start: 2025-08-07 | End: 2025-08-08 | Stop reason: HOSPADM

## 2025-08-07 RX ADMIN — SODIUM CHLORIDE, PRESERVATIVE FREE 10 ML: 5 INJECTION INTRAVENOUS at 12:15

## 2025-08-07 RX ADMIN — SODIUM CHLORIDE, PRESERVATIVE FREE 10 ML: 5 INJECTION INTRAVENOUS at 10:25

## 2025-08-07 RX ADMIN — SODIUM CHLORIDE 1000 MG: 9 INJECTION, SOLUTION INTRAVENOUS at 11:37

## 2025-08-07 RX ADMIN — SODIUM CHLORIDE, PRESERVATIVE FREE 10 ML: 5 INJECTION INTRAVENOUS at 10:26

## 2025-08-07 RX ADMIN — HEPARIN 500 UNITS: 100 SYRINGE at 12:15

## 2025-08-07 RX ADMIN — SODIUM CHLORIDE, PRESERVATIVE FREE 10 ML: 5 INJECTION INTRAVENOUS at 12:14

## 2025-08-07 RX ADMIN — SODIUM CHLORIDE 100 ML/HR: 0.9 INJECTION, SOLUTION INTRAVENOUS at 11:33

## 2025-08-11 ENCOUNTER — CARE COORDINATION (OUTPATIENT)
Dept: CARE COORDINATION | Age: 85
End: 2025-08-11

## 2025-08-20 ENCOUNTER — OFFICE VISIT (OUTPATIENT)
Dept: ONCOLOGY | Age: 85
End: 2025-08-20
Payer: MEDICARE

## 2025-08-20 VITALS
DIASTOLIC BLOOD PRESSURE: 62 MMHG | RESPIRATION RATE: 18 BRPM | SYSTOLIC BLOOD PRESSURE: 146 MMHG | BODY MASS INDEX: 27.98 KG/M2 | HEART RATE: 69 BPM | TEMPERATURE: 97.1 F | WEIGHT: 153 LBS

## 2025-08-20 DIAGNOSIS — C56.3 MALIGNANT NEOPLASM OF BOTH OVARIES (HCC): ICD-10-CM

## 2025-08-20 DIAGNOSIS — C76.2 ABDOMINAL CARCINOMATOSIS (HCC): ICD-10-CM

## 2025-08-20 DIAGNOSIS — C76.2 ABDOMINAL CARCINOMATOSIS (HCC): Primary | ICD-10-CM

## 2025-08-20 DIAGNOSIS — C56.3 MALIGNANT NEOPLASM OF BOTH OVARIES (HCC): Primary | ICD-10-CM

## 2025-08-20 PROCEDURE — 1126F AMNT PAIN NOTED NONE PRSNT: CPT | Performed by: INTERNAL MEDICINE

## 2025-08-20 PROCEDURE — G8417 CALC BMI ABV UP PARAM F/U: HCPCS | Performed by: INTERNAL MEDICINE

## 2025-08-20 PROCEDURE — G8400 PT W/DXA NO RESULTS DOC: HCPCS | Performed by: INTERNAL MEDICINE

## 2025-08-20 PROCEDURE — 99214 OFFICE O/P EST MOD 30 MIN: CPT | Performed by: INTERNAL MEDICINE

## 2025-08-20 PROCEDURE — 1090F PRES/ABSN URINE INCON ASSESS: CPT | Performed by: INTERNAL MEDICINE

## 2025-08-20 PROCEDURE — 1036F TOBACCO NON-USER: CPT | Performed by: INTERNAL MEDICINE

## 2025-08-20 PROCEDURE — 1159F MED LIST DOCD IN RCRD: CPT | Performed by: INTERNAL MEDICINE

## 2025-08-20 PROCEDURE — 99211 OFF/OP EST MAY X REQ PHY/QHP: CPT | Performed by: INTERNAL MEDICINE

## 2025-08-20 PROCEDURE — G8427 DOCREV CUR MEDS BY ELIG CLIN: HCPCS | Performed by: INTERNAL MEDICINE

## 2025-08-20 PROCEDURE — 1123F ACP DISCUSS/DSCN MKR DOCD: CPT | Performed by: INTERNAL MEDICINE

## 2025-08-20 RX ORDER — SODIUM CHLORIDE 0.9 % (FLUSH) 0.9 %
5-40 SYRINGE (ML) INJECTION PRN
OUTPATIENT
Start: 2025-09-18

## 2025-08-20 RX ORDER — DIPHENHYDRAMINE HYDROCHLORIDE 50 MG/ML
50 INJECTION, SOLUTION INTRAMUSCULAR; INTRAVENOUS
OUTPATIENT
Start: 2025-09-18

## 2025-08-20 RX ORDER — SODIUM CHLORIDE 9 MG/ML
INJECTION, SOLUTION INTRAVENOUS CONTINUOUS
OUTPATIENT
Start: 2025-09-18

## 2025-08-20 RX ORDER — ACETAMINOPHEN 325 MG/1
650 TABLET ORAL
OUTPATIENT
Start: 2025-09-18

## 2025-08-20 RX ORDER — ONDANSETRON 2 MG/ML
8 INJECTION INTRAMUSCULAR; INTRAVENOUS
OUTPATIENT
Start: 2025-08-28

## 2025-08-20 RX ORDER — HEPARIN SODIUM (PORCINE) LOCK FLUSH IV SOLN 100 UNIT/ML 100 UNIT/ML
500 SOLUTION INTRAVENOUS PRN
OUTPATIENT
Start: 2025-09-18

## 2025-08-20 RX ORDER — EPINEPHRINE 1 MG/ML
0.3 INJECTION, SOLUTION, CONCENTRATE INTRAVENOUS PRN
OUTPATIENT
Start: 2025-08-28

## 2025-08-20 RX ORDER — SODIUM CHLORIDE 9 MG/ML
5-250 INJECTION, SOLUTION INTRAVENOUS PRN
OUTPATIENT
Start: 2025-08-28

## 2025-08-20 RX ORDER — ACETAMINOPHEN 325 MG/1
650 TABLET ORAL
OUTPATIENT
Start: 2025-08-28

## 2025-08-20 RX ORDER — DIPHENHYDRAMINE HYDROCHLORIDE 50 MG/ML
50 INJECTION, SOLUTION INTRAMUSCULAR; INTRAVENOUS
OUTPATIENT
Start: 2025-08-28

## 2025-08-20 RX ORDER — EPINEPHRINE 1 MG/ML
0.3 INJECTION, SOLUTION, CONCENTRATE INTRAVENOUS PRN
OUTPATIENT
Start: 2025-09-18

## 2025-08-20 RX ORDER — HYDROCORTISONE SODIUM SUCCINATE 100 MG/2ML
100 INJECTION INTRAMUSCULAR; INTRAVENOUS
OUTPATIENT
Start: 2025-09-18

## 2025-08-20 RX ORDER — ALBUTEROL SULFATE 90 UG/1
4 INHALANT RESPIRATORY (INHALATION) PRN
OUTPATIENT
Start: 2025-08-28

## 2025-08-20 RX ORDER — MEPERIDINE HYDROCHLORIDE 50 MG/ML
12.5 INJECTION INTRAMUSCULAR; INTRAVENOUS; SUBCUTANEOUS PRN
OUTPATIENT
Start: 2025-08-28

## 2025-08-20 RX ORDER — ONDANSETRON 2 MG/ML
8 INJECTION INTRAMUSCULAR; INTRAVENOUS
OUTPATIENT
Start: 2025-09-18

## 2025-08-20 RX ORDER — HYDROCORTISONE SODIUM SUCCINATE 100 MG/2ML
100 INJECTION INTRAMUSCULAR; INTRAVENOUS
OUTPATIENT
Start: 2025-08-28

## 2025-08-20 RX ORDER — FAMOTIDINE 10 MG/ML
20 INJECTION, SOLUTION INTRAVENOUS
OUTPATIENT
Start: 2025-09-18

## 2025-08-20 RX ORDER — MEPERIDINE HYDROCHLORIDE 50 MG/ML
12.5 INJECTION INTRAMUSCULAR; INTRAVENOUS; SUBCUTANEOUS PRN
OUTPATIENT
Start: 2025-09-18

## 2025-08-20 RX ORDER — SODIUM CHLORIDE 0.9 % (FLUSH) 0.9 %
5-40 SYRINGE (ML) INJECTION PRN
OUTPATIENT
Start: 2025-08-28

## 2025-08-20 RX ORDER — FAMOTIDINE 10 MG/ML
20 INJECTION, SOLUTION INTRAVENOUS
OUTPATIENT
Start: 2025-08-28

## 2025-08-20 RX ORDER — SODIUM CHLORIDE 9 MG/ML
INJECTION, SOLUTION INTRAVENOUS CONTINUOUS
OUTPATIENT
Start: 2025-08-28

## 2025-08-20 RX ORDER — SODIUM CHLORIDE 9 MG/ML
5-250 INJECTION, SOLUTION INTRAVENOUS PRN
OUTPATIENT
Start: 2025-09-18

## 2025-08-20 RX ORDER — HEPARIN SODIUM (PORCINE) LOCK FLUSH IV SOLN 100 UNIT/ML 100 UNIT/ML
500 SOLUTION INTRAVENOUS PRN
OUTPATIENT
Start: 2025-08-28

## 2025-08-20 RX ORDER — ALBUTEROL SULFATE 90 UG/1
4 INHALANT RESPIRATORY (INHALATION) PRN
OUTPATIENT
Start: 2025-09-18

## 2025-08-21 ENCOUNTER — CARE COORDINATION (OUTPATIENT)
Dept: CARE COORDINATION | Age: 85
End: 2025-08-21

## 2025-08-28 ENCOUNTER — HOSPITAL ENCOUNTER (OUTPATIENT)
Dept: INFUSION THERAPY | Age: 85
Discharge: HOME | End: 2025-08-28
Payer: MEDICARE

## 2025-08-28 VITALS
SYSTOLIC BLOOD PRESSURE: 137 MMHG | DIASTOLIC BLOOD PRESSURE: 56 MMHG | RESPIRATION RATE: 18 BRPM | HEART RATE: 76 BPM | BODY MASS INDEX: 28.34 KG/M2 | HEIGHT: 62 IN | TEMPERATURE: 97.3 F | WEIGHT: 154 LBS

## 2025-08-28 DIAGNOSIS — C76.2 ABDOMINAL CARCINOMATOSIS (HCC): ICD-10-CM

## 2025-08-28 DIAGNOSIS — C56.3 MALIGNANT NEOPLASM OF BOTH OVARIES (HCC): Primary | ICD-10-CM

## 2025-08-28 LAB
ALBUMIN SERPL-MCNC: 3.9 G/DL (ref 3.5–5.2)
ALBUMIN/GLOB SERPL: 1.7 {RATIO} (ref 1–2.5)
ALP SERPL-CCNC: 61 U/L (ref 35–104)
ALT SERPL-CCNC: 8 U/L (ref 10–35)
ANION GAP SERPL CALCULATED.3IONS-SCNC: 11 MMOL/L (ref 9–16)
AST SERPL-CCNC: 20 U/L (ref 10–35)
BACTERIA URNS QL MICRO: ABNORMAL
BASOPHILS # BLD: 0.05 K/UL (ref 0–0.2)
BASOPHILS NFR BLD: 1 % (ref 0–2)
BILIRUB SERPL-MCNC: 0.2 MG/DL (ref 0–1.2)
BILIRUB UR QL STRIP: NEGATIVE
BUN SERPL-MCNC: 21 MG/DL (ref 8–23)
BUN/CREAT SERPL: 23 (ref 9–20)
CALCIUM SERPL-MCNC: 8.7 MG/DL (ref 8.6–10.4)
CANCER AG125 SERPL-ACNC: 10 U/ML (ref 0–38)
CHLORIDE SERPL-SCNC: 107 MMOL/L (ref 98–107)
CLARITY UR: CLEAR
CO2 SERPL-SCNC: 25 MMOL/L (ref 20–31)
COLOR UR: YELLOW
CREAT SERPL-MCNC: 0.9 MG/DL (ref 0.5–0.9)
EOSINOPHIL # BLD: 0.27 K/UL (ref 0–0.44)
EOSINOPHILS RELATIVE PERCENT: 4 % (ref 1–4)
EPI CELLS #/AREA URNS HPF: ABNORMAL /HPF (ref 0–25)
ERYTHROCYTE [DISTWIDTH] IN BLOOD BY AUTOMATED COUNT: 13.3 % (ref 11.8–14.4)
GFR, ESTIMATED: 66 ML/MIN/1.73M2
GLUCOSE SERPL-MCNC: 137 MG/DL (ref 74–99)
GLUCOSE UR STRIP-MCNC: NEGATIVE MG/DL
HCT VFR BLD AUTO: 34.5 % (ref 36.3–47.1)
HGB BLD-MCNC: 11.7 G/DL (ref 11.9–15.1)
HGB UR QL STRIP.AUTO: NEGATIVE
IMM GRANULOCYTES # BLD AUTO: <0.03 K/UL (ref 0–0.3)
IMM GRANULOCYTES NFR BLD: 0 %
KETONES UR STRIP-MCNC: NEGATIVE MG/DL
LEUKOCYTE ESTERASE UR QL STRIP: NEGATIVE
LYMPHOCYTES NFR BLD: 0.81 K/UL (ref 1.1–3.7)
LYMPHOCYTES RELATIVE PERCENT: 12 % (ref 24–43)
MCH RBC QN AUTO: 29.6 PG (ref 25.2–33.5)
MCHC RBC AUTO-ENTMCNC: 33.9 G/DL (ref 28.4–34.8)
MCV RBC AUTO: 87.3 FL (ref 82.6–102.9)
MONOCYTES NFR BLD: 0.65 K/UL (ref 0.1–1.2)
MONOCYTES NFR BLD: 9 % (ref 3–12)
NEUTROPHILS NFR BLD: 74 % (ref 36–65)
NEUTS SEG NFR BLD: 5.09 K/UL (ref 1.5–8.1)
NITRITE UR QL STRIP: NEGATIVE
NRBC BLD-RTO: 0 PER 100 WBC
PH UR STRIP: 7 [PH] (ref 5–9)
PLATELET # BLD AUTO: 204 K/UL (ref 138–453)
PMV BLD AUTO: 9.6 FL (ref 8.1–13.5)
POTASSIUM SERPL-SCNC: 4.2 MMOL/L (ref 3.7–5.3)
PROT SERPL-MCNC: 6.1 G/DL (ref 6.6–8.7)
PROT UR STRIP-MCNC: ABNORMAL MG/DL
RBC # BLD AUTO: 3.95 M/UL (ref 3.95–5.11)
RBC #/AREA URNS HPF: ABNORMAL /HPF (ref 0–2)
SODIUM SERPL-SCNC: 143 MMOL/L (ref 136–145)
SP GR UR STRIP: 1.01 (ref 1.01–1.02)
UROBILINOGEN UR STRIP-ACNC: NORMAL EU/DL (ref 0–1)
WBC #/AREA URNS HPF: ABNORMAL /HPF (ref 0–5)
WBC OTHER # BLD: 6.9 K/UL (ref 3.5–11.3)

## 2025-08-28 PROCEDURE — 86304 IMMUNOASSAY TUMOR CA 125: CPT

## 2025-08-28 PROCEDURE — 81001 URINALYSIS AUTO W/SCOPE: CPT

## 2025-08-28 PROCEDURE — 96413 CHEMO IV INFUSION 1 HR: CPT

## 2025-08-28 PROCEDURE — 85025 COMPLETE CBC W/AUTO DIFF WBC: CPT

## 2025-08-28 PROCEDURE — 80053 COMPREHEN METABOLIC PANEL: CPT

## 2025-08-28 PROCEDURE — 6360000002 HC RX W HCPCS: Performed by: INTERNAL MEDICINE

## 2025-08-28 PROCEDURE — 2580000003 HC RX 258: Performed by: INTERNAL MEDICINE

## 2025-08-28 PROCEDURE — 2500000003 HC RX 250 WO HCPCS: Performed by: INTERNAL MEDICINE

## 2025-08-28 RX ORDER — HEPARIN 100 UNIT/ML
500 SYRINGE INTRAVENOUS PRN
Status: DISCONTINUED | OUTPATIENT
Start: 2025-08-28 | End: 2025-08-29 | Stop reason: HOSPADM

## 2025-08-28 RX ORDER — SODIUM CHLORIDE 0.9 % (FLUSH) 0.9 %
5-40 SYRINGE (ML) INJECTION PRN
Status: DISCONTINUED | OUTPATIENT
Start: 2025-08-28 | End: 2025-08-29 | Stop reason: HOSPADM

## 2025-08-28 RX ORDER — SODIUM CHLORIDE 9 MG/ML
5-250 INJECTION, SOLUTION INTRAVENOUS PRN
Status: DISCONTINUED | OUTPATIENT
Start: 2025-08-28 | End: 2025-08-29 | Stop reason: HOSPADM

## 2025-08-28 RX ADMIN — SODIUM CHLORIDE, PRESERVATIVE FREE 10 ML: 5 INJECTION INTRAVENOUS at 10:13

## 2025-08-28 RX ADMIN — HEPARIN 500 UNITS: 100 SYRINGE at 12:46

## 2025-08-28 RX ADMIN — SODIUM CHLORIDE, PRESERVATIVE FREE 10 ML: 5 INJECTION INTRAVENOUS at 12:46

## 2025-08-28 RX ADMIN — SODIUM CHLORIDE, PRESERVATIVE FREE 10 ML: 5 INJECTION INTRAVENOUS at 12:45

## 2025-08-28 RX ADMIN — SODIUM CHLORIDE 100 ML/HR: 0.9 INJECTION, SOLUTION INTRAVENOUS at 12:08

## 2025-08-28 RX ADMIN — SODIUM CHLORIDE 1000 MG: 9 INJECTION, SOLUTION INTRAVENOUS at 12:11

## 2025-08-28 RX ADMIN — SODIUM CHLORIDE, PRESERVATIVE FREE 10 ML: 5 INJECTION INTRAVENOUS at 10:14

## 2025-08-28 ASSESSMENT — PAIN SCALES - GENERAL: PAINLEVEL_OUTOF10: 0

## 2025-09-05 ENCOUNTER — CARE COORDINATION (OUTPATIENT)
Dept: CARE COORDINATION | Age: 85
End: 2025-09-05